# Patient Record
Sex: MALE | Race: ASIAN | NOT HISPANIC OR LATINO | URBAN - METROPOLITAN AREA
[De-identification: names, ages, dates, MRNs, and addresses within clinical notes are randomized per-mention and may not be internally consistent; named-entity substitution may affect disease eponyms.]

---

## 2018-02-09 ENCOUNTER — INPATIENT (INPATIENT)
Facility: HOSPITAL | Age: 73
LOS: 8 days | Discharge: HOME | End: 2018-02-18
Attending: INTERNAL MEDICINE | Admitting: INTERNAL MEDICINE
Payer: MEDICARE

## 2018-02-09 VITALS
TEMPERATURE: 98 F | SYSTOLIC BLOOD PRESSURE: 145 MMHG | DIASTOLIC BLOOD PRESSURE: 72 MMHG | OXYGEN SATURATION: 99 % | HEART RATE: 88 BPM | RESPIRATION RATE: 18 BRPM

## 2018-02-09 LAB
ALBUMIN SERPL ELPH-MCNC: 3 G/DL — LOW (ref 3.3–5)
ALP SERPL-CCNC: 56 U/L — SIGNIFICANT CHANGE UP (ref 40–120)
ALT FLD-CCNC: 9 U/L — SIGNIFICANT CHANGE UP (ref 4–41)
APTT BLD: 60.9 SEC — HIGH (ref 27.5–37.4)
AST SERPL-CCNC: 26 U/L — SIGNIFICANT CHANGE UP (ref 4–40)
BASE EXCESS BLDV CALC-SCNC: 6.1 MMOL/L — SIGNIFICANT CHANGE UP
BASOPHILS # BLD AUTO: 0.01 K/UL — SIGNIFICANT CHANGE UP (ref 0–0.2)
BASOPHILS NFR BLD AUTO: 0.1 % — SIGNIFICANT CHANGE UP (ref 0–2)
BILIRUB SERPL-MCNC: 0.9 MG/DL — SIGNIFICANT CHANGE UP (ref 0.2–1.2)
BLOOD GAS VENOUS - CREATININE: 5.22 MG/DL — HIGH (ref 0.5–1.3)
BUN SERPL-MCNC: 22 MG/DL — SIGNIFICANT CHANGE UP (ref 7–23)
CALCIUM SERPL-MCNC: 8.2 MG/DL — LOW (ref 8.4–10.5)
CHLORIDE BLDV-SCNC: 100 MMOL/L — SIGNIFICANT CHANGE UP (ref 96–108)
CHLORIDE SERPL-SCNC: 96 MMOL/L — LOW (ref 98–107)
CO2 SERPL-SCNC: 24 MMOL/L — SIGNIFICANT CHANGE UP (ref 22–31)
CREAT SERPL-MCNC: 5.01 MG/DL — HIGH (ref 0.5–1.3)
EOSINOPHIL # BLD AUTO: 0.18 K/UL — SIGNIFICANT CHANGE UP (ref 0–0.5)
EOSINOPHIL NFR BLD AUTO: 2.6 % — SIGNIFICANT CHANGE UP (ref 0–6)
GAS PNL BLDV: 136 MMOL/L — SIGNIFICANT CHANGE UP (ref 136–146)
GLUCOSE BLDV-MCNC: 116 — HIGH (ref 70–99)
GLUCOSE SERPL-MCNC: 109 MG/DL — HIGH (ref 70–99)
HCO3 BLDV-SCNC: 30 MMOL/L — HIGH (ref 20–27)
HCT VFR BLD CALC: 22.7 % — LOW (ref 39–50)
HCT VFR BLDV CALC: 22.5 % — LOW (ref 39–51)
HGB BLD-MCNC: 7.2 G/DL — LOW (ref 13–17)
HGB BLDV-MCNC: 7.2 G/DL — LOW (ref 13–17)
IMM GRANULOCYTES # BLD AUTO: 0.03 # — SIGNIFICANT CHANGE UP
IMM GRANULOCYTES NFR BLD AUTO: 0.4 % — SIGNIFICANT CHANGE UP (ref 0–1.5)
INR BLD: 6.6 — CRITICAL HIGH (ref 0.88–1.17)
LACTATE BLDV-MCNC: 1.4 MMOL/L — SIGNIFICANT CHANGE UP (ref 0.5–2)
LYMPHOCYTES # BLD AUTO: 0.43 K/UL — LOW (ref 1–3.3)
LYMPHOCYTES # BLD AUTO: 6.2 % — LOW (ref 13–44)
MCHC RBC-ENTMCNC: 30.5 PG — SIGNIFICANT CHANGE UP (ref 27–34)
MCHC RBC-ENTMCNC: 31.7 % — LOW (ref 32–36)
MCV RBC AUTO: 96.2 FL — SIGNIFICANT CHANGE UP (ref 80–100)
MONOCYTES # BLD AUTO: 0.45 K/UL — SIGNIFICANT CHANGE UP (ref 0–0.9)
MONOCYTES NFR BLD AUTO: 6.5 % — SIGNIFICANT CHANGE UP (ref 2–14)
NEUTROPHILS # BLD AUTO: 5.79 K/UL — SIGNIFICANT CHANGE UP (ref 1.8–7.4)
NEUTROPHILS NFR BLD AUTO: 84.2 % — HIGH (ref 43–77)
NRBC # FLD: 0 — SIGNIFICANT CHANGE UP
PCO2 BLDV: 43 MMHG — SIGNIFICANT CHANGE UP (ref 41–51)
PH BLDV: 7.46 PH — HIGH (ref 7.32–7.43)
PLATELET # BLD AUTO: 185 K/UL — SIGNIFICANT CHANGE UP (ref 150–400)
PMV BLD: 10 FL — SIGNIFICANT CHANGE UP (ref 7–13)
PO2 BLDV: 45 MMHG — HIGH (ref 35–40)
POTASSIUM BLDV-SCNC: 3.8 MMOL/L — SIGNIFICANT CHANGE UP (ref 3.4–4.5)
POTASSIUM SERPL-MCNC: 4.1 MMOL/L — SIGNIFICANT CHANGE UP (ref 3.5–5.3)
POTASSIUM SERPL-SCNC: 4.1 MMOL/L — SIGNIFICANT CHANGE UP (ref 3.5–5.3)
PROT SERPL-MCNC: 7.3 G/DL — SIGNIFICANT CHANGE UP (ref 6–8.3)
PROTHROM AB SERPL-ACNC: 76.1 SEC — HIGH (ref 9.8–13.1)
RBC # BLD: 2.36 M/UL — LOW (ref 4.2–5.8)
RBC # FLD: 14.4 % — SIGNIFICANT CHANGE UP (ref 10.3–14.5)
SAO2 % BLDV: 78 % — SIGNIFICANT CHANGE UP (ref 60–85)
SODIUM SERPL-SCNC: 137 MMOL/L — SIGNIFICANT CHANGE UP (ref 135–145)
WBC # BLD: 6.89 K/UL — SIGNIFICANT CHANGE UP (ref 3.8–10.5)
WBC # FLD AUTO: 6.89 K/UL — SIGNIFICANT CHANGE UP (ref 3.8–10.5)

## 2018-02-09 RX ORDER — ACETAMINOPHEN 500 MG
975 TABLET ORAL ONCE
Qty: 0 | Refills: 0 | Status: COMPLETED | OUTPATIENT
Start: 2018-02-09 | End: 2018-02-09

## 2018-02-09 RX ORDER — DIPHENHYDRAMINE HCL 50 MG
25 CAPSULE ORAL ONCE
Qty: 0 | Refills: 0 | Status: COMPLETED | OUTPATIENT
Start: 2018-02-09 | End: 2018-02-09

## 2018-02-09 RX ORDER — ALBUTEROL 90 UG/1
2.5 AEROSOL, METERED ORAL ONCE
Qty: 0 | Refills: 0 | Status: DISCONTINUED | OUTPATIENT
Start: 2018-02-09 | End: 2018-02-09

## 2018-02-09 RX ORDER — PIPERACILLIN AND TAZOBACTAM 4; .5 G/20ML; G/20ML
3.38 INJECTION, POWDER, LYOPHILIZED, FOR SOLUTION INTRAVENOUS ONCE
Qty: 0 | Refills: 0 | Status: COMPLETED | OUTPATIENT
Start: 2018-02-09 | End: 2018-02-09

## 2018-02-09 RX ORDER — WARFARIN SODIUM 2.5 MG/1
1 TABLET ORAL ONCE
Qty: 0 | Refills: 0 | Status: COMPLETED | OUTPATIENT
Start: 2018-02-09 | End: 2018-02-09

## 2018-02-09 RX ORDER — VANCOMYCIN HCL 1 G
1000 VIAL (EA) INTRAVENOUS ONCE
Qty: 0 | Refills: 0 | Status: COMPLETED | OUTPATIENT
Start: 2018-02-09 | End: 2018-02-09

## 2018-02-09 RX ADMIN — Medication 100 MILLIGRAM(S): at 21:01

## 2018-02-09 RX ADMIN — Medication 975 MILLIGRAM(S): at 23:05

## 2018-02-09 RX ADMIN — PIPERACILLIN AND TAZOBACTAM 200 GRAM(S): 4; .5 INJECTION, POWDER, LYOPHILIZED, FOR SOLUTION INTRAVENOUS at 21:01

## 2018-02-09 RX ADMIN — Medication 25 MILLIGRAM(S): at 22:25

## 2018-02-09 RX ADMIN — Medication 250 MILLIGRAM(S): at 21:37

## 2018-02-09 RX ADMIN — WARFARIN SODIUM 1 MILLIGRAM(S): 2.5 TABLET ORAL at 23:05

## 2018-02-09 NOTE — ED ADULT TRIAGE NOTE - CHIEF COMPLAINT QUOTE
Pt bibems from home, hx of esrd bilateral av fistulas, last hd this amt, htn, and afib on coumadin, pt here for evaluation of shortness of breath and cough, xray of chest shows pt has pna. no fevers at home

## 2018-02-09 NOTE — ED PROVIDER NOTE - OBJECTIVE STATEMENT
72M with PMH of ESRD on HD (MWF- full session) via LUE AVF, afib, mechanical mitral valve repair (on warfarin) p/w cough x 3 weeks since was hospitalized in jan has been coughing since  at OSH at bleeding in fistula;. no cp. + productive- unsure what color; + decreased po x several days; no diarrhea/constipation/dysuria; no hemoptysis; had flu shot this year;  x-ray this am shows pna at [facility] radiology;      kidney doctor: fresh feliz;     mayo ting- flushing;

## 2018-02-09 NOTE — ED PROVIDER NOTE - CARE PLAN
Principal Discharge DX:	PNA (pneumonia)  Secondary Diagnosis:	Atrial fibrillation with rapid ventricular response

## 2018-02-09 NOTE — ED PROVIDER NOTE - ATTENDING CONTRIBUTION TO CARE
EZEKIEL Attending Note - Dr. Chavez  72M with PMH of ESRD on HD (MWF- full session) via LUE AVF, afib, mechanical mitral valve repair (on warfarin) p/w cough x 3 weeks with exertional duspnea.  PE: pt is alert and oriented but appears weak and fatigued, perrl, ent normal, membranes are dry, neck supple. no lymphadenopathy or thyroid enlargement, No JVD.  Chest clear to P&A, Heart- Irreg rhythm without murmur, rubs or gallops but metallic click., radial pulses equal bilaterally.  Abd is soft, non-tender, Bowel sounds are active. no mass or organomegaly. : No CVA tenderness. Neuro:  Pt alert and oriented x 3. Perrl    Distal neurosensory is intact. Motor function is 5/5 strength bilaterally.  No focal deficits. Extremities:  No edema.  Skin: warm and dry.  Plan: labs with CBC, CMP, EKG to R/O cardiac etiology, CXR to R/O pneumoniae.   Impression:   Pneumonia

## 2018-02-09 NOTE — ED ADULT NURSE NOTE - OBJECTIVE STATEMENT
Pt received in spot 7. Alert and oriented x3. Primarily Cantonese speaking, wife at bedside to translate. Co sob, cough x 2 weeks. Sent from pcp for +pneumonia in chest x-ray. Afebrile at current time. Pt has bilateral arm fistulas. +bruit and thrill. Wife states patient normally requires an IV in his foot. MD made aware. Placed on cm. VS as stated. Awaiting MD ellis. Comfort measures provided. Will monitor.

## 2018-02-10 DIAGNOSIS — Z29.9 ENCOUNTER FOR PROPHYLACTIC MEASURES, UNSPECIFIED: ICD-10-CM

## 2018-02-10 DIAGNOSIS — R63.8 OTHER SYMPTOMS AND SIGNS CONCERNING FOOD AND FLUID INTAKE: ICD-10-CM

## 2018-02-10 DIAGNOSIS — J18.9 PNEUMONIA, UNSPECIFIED ORGANISM: ICD-10-CM

## 2018-02-10 DIAGNOSIS — N18.6 END STAGE RENAL DISEASE: ICD-10-CM

## 2018-02-10 DIAGNOSIS — Z95.2 PRESENCE OF PROSTHETIC HEART VALVE: ICD-10-CM

## 2018-02-10 DIAGNOSIS — I10 ESSENTIAL (PRIMARY) HYPERTENSION: ICD-10-CM

## 2018-02-10 DIAGNOSIS — I48.91 UNSPECIFIED ATRIAL FIBRILLATION: ICD-10-CM

## 2018-02-10 LAB
B PERT DNA SPEC QL NAA+PROBE: SIGNIFICANT CHANGE UP
BLD GP AB SCN SERPL QL: NEGATIVE — SIGNIFICANT CHANGE UP
BUN SERPL-MCNC: 34 MG/DL — HIGH (ref 7–23)
C PNEUM DNA SPEC QL NAA+PROBE: NOT DETECTED — SIGNIFICANT CHANGE UP
CALCIUM SERPL-MCNC: 8.5 MG/DL — SIGNIFICANT CHANGE UP (ref 8.4–10.5)
CHLORIDE SERPL-SCNC: 94 MMOL/L — LOW (ref 98–107)
CO2 SERPL-SCNC: 26 MMOL/L — SIGNIFICANT CHANGE UP (ref 22–31)
CREAT SERPL-MCNC: 6.71 MG/DL — HIGH (ref 0.5–1.3)
FLUAV H1 2009 PAND RNA SPEC QL NAA+PROBE: POSITIVE — HIGH
FLUAV H1 RNA SPEC QL NAA+PROBE: NOT DETECTED — SIGNIFICANT CHANGE UP
FLUAV H3 RNA SPEC QL NAA+PROBE: NOT DETECTED — SIGNIFICANT CHANGE UP
FLUBV RNA SPEC QL NAA+PROBE: NOT DETECTED — SIGNIFICANT CHANGE UP
GLUCOSE SERPL-MCNC: 106 MG/DL — HIGH (ref 70–99)
HADV DNA SPEC QL NAA+PROBE: NOT DETECTED — SIGNIFICANT CHANGE UP
HCOV 229E RNA SPEC QL NAA+PROBE: NOT DETECTED — SIGNIFICANT CHANGE UP
HCOV HKU1 RNA SPEC QL NAA+PROBE: NOT DETECTED — SIGNIFICANT CHANGE UP
HCOV NL63 RNA SPEC QL NAA+PROBE: NOT DETECTED — SIGNIFICANT CHANGE UP
HCOV OC43 RNA SPEC QL NAA+PROBE: NOT DETECTED — SIGNIFICANT CHANGE UP
HCT VFR BLD CALC: 22.3 % — LOW (ref 39–50)
HCT VFR BLD CALC: 22.8 % — LOW (ref 39–50)
HGB BLD-MCNC: 7 G/DL — CRITICAL LOW (ref 13–17)
HGB BLD-MCNC: 7.5 G/DL — LOW (ref 13–17)
HMPV RNA SPEC QL NAA+PROBE: NOT DETECTED — SIGNIFICANT CHANGE UP
HPIV1 RNA SPEC QL NAA+PROBE: NOT DETECTED — SIGNIFICANT CHANGE UP
HPIV2 RNA SPEC QL NAA+PROBE: NOT DETECTED — SIGNIFICANT CHANGE UP
HPIV3 RNA SPEC QL NAA+PROBE: NOT DETECTED — SIGNIFICANT CHANGE UP
HPIV4 RNA SPEC QL NAA+PROBE: NOT DETECTED — SIGNIFICANT CHANGE UP
INR BLD: 7.53 — CRITICAL HIGH (ref 0.88–1.17)
M PNEUMO DNA SPEC QL NAA+PROBE: NOT DETECTED — SIGNIFICANT CHANGE UP
MAGNESIUM SERPL-MCNC: 2.2 MG/DL — SIGNIFICANT CHANGE UP (ref 1.6–2.6)
MCHC RBC-ENTMCNC: 30.4 PG — SIGNIFICANT CHANGE UP (ref 27–34)
MCHC RBC-ENTMCNC: 31.4 % — LOW (ref 32–36)
MCHC RBC-ENTMCNC: 31.9 PG — SIGNIFICANT CHANGE UP (ref 27–34)
MCHC RBC-ENTMCNC: 32.9 % — SIGNIFICANT CHANGE UP (ref 32–36)
MCV RBC AUTO: 97 FL — SIGNIFICANT CHANGE UP (ref 80–100)
MCV RBC AUTO: 97 FL — SIGNIFICANT CHANGE UP (ref 80–100)
NRBC # FLD: 0 — SIGNIFICANT CHANGE UP
NRBC # FLD: 0 — SIGNIFICANT CHANGE UP
PHOSPHATE SERPL-MCNC: 3.8 MG/DL — SIGNIFICANT CHANGE UP (ref 2.5–4.5)
PLATELET # BLD AUTO: 166 K/UL — SIGNIFICANT CHANGE UP (ref 150–400)
PLATELET # BLD AUTO: 174 K/UL — SIGNIFICANT CHANGE UP (ref 150–400)
PMV BLD: 10.1 FL — SIGNIFICANT CHANGE UP (ref 7–13)
PMV BLD: 10.3 FL — SIGNIFICANT CHANGE UP (ref 7–13)
POTASSIUM SERPL-MCNC: 4.1 MMOL/L — SIGNIFICANT CHANGE UP (ref 3.5–5.3)
POTASSIUM SERPL-SCNC: 4.1 MMOL/L — SIGNIFICANT CHANGE UP (ref 3.5–5.3)
PROTHROM AB SERPL-ACNC: 87.1 SEC — HIGH (ref 9.8–13.1)
RBC # BLD: 2.3 M/UL — LOW (ref 4.2–5.8)
RBC # BLD: 2.35 M/UL — LOW (ref 4.2–5.8)
RBC # FLD: 14.5 % — SIGNIFICANT CHANGE UP (ref 10.3–14.5)
RBC # FLD: 14.7 % — HIGH (ref 10.3–14.5)
RH IG SCN BLD-IMP: POSITIVE — SIGNIFICANT CHANGE UP
RSV RNA SPEC QL NAA+PROBE: NOT DETECTED — SIGNIFICANT CHANGE UP
RV+EV RNA SPEC QL NAA+PROBE: NOT DETECTED — SIGNIFICANT CHANGE UP
SODIUM SERPL-SCNC: 134 MMOL/L — LOW (ref 135–145)
SPECIMEN SOURCE: SIGNIFICANT CHANGE UP
SPECIMEN SOURCE: SIGNIFICANT CHANGE UP
TSH SERPL-MCNC: 4.49 UIU/ML — HIGH (ref 0.27–4.2)
VANCOMYCIN FLD-MCNC: 3.1 UG/ML — SIGNIFICANT CHANGE UP
WBC # BLD: 5.86 K/UL — SIGNIFICANT CHANGE UP (ref 3.8–10.5)
WBC # BLD: 6.98 K/UL — SIGNIFICANT CHANGE UP (ref 3.8–10.5)
WBC # FLD AUTO: 5.86 K/UL — SIGNIFICANT CHANGE UP (ref 3.8–10.5)
WBC # FLD AUTO: 6.98 K/UL — SIGNIFICANT CHANGE UP (ref 3.8–10.5)

## 2018-02-10 PROCEDURE — 71046 X-RAY EXAM CHEST 2 VIEWS: CPT | Mod: 26

## 2018-02-10 PROCEDURE — 71250 CT THORAX DX C-: CPT | Mod: 26

## 2018-02-10 PROCEDURE — 12345: CPT | Mod: NC

## 2018-02-10 PROCEDURE — 93010 ELECTROCARDIOGRAM REPORT: CPT

## 2018-02-10 PROCEDURE — 99223 1ST HOSP IP/OBS HIGH 75: CPT | Mod: GC

## 2018-02-10 RX ORDER — CEFEPIME 1 G/1
1000 INJECTION, POWDER, FOR SOLUTION INTRAMUSCULAR; INTRAVENOUS EVERY 24 HOURS
Qty: 0 | Refills: 0 | Status: DISCONTINUED | OUTPATIENT
Start: 2018-02-10 | End: 2018-02-11

## 2018-02-10 RX ORDER — DONEPEZIL HYDROCHLORIDE 10 MG/1
5 TABLET, FILM COATED ORAL AT BEDTIME
Qty: 0 | Refills: 0 | Status: DISCONTINUED | OUTPATIENT
Start: 2018-02-10 | End: 2018-02-18

## 2018-02-10 RX ORDER — ATORVASTATIN CALCIUM 80 MG/1
40 TABLET, FILM COATED ORAL AT BEDTIME
Qty: 0 | Refills: 0 | Status: DISCONTINUED | OUTPATIENT
Start: 2018-02-10 | End: 2018-02-18

## 2018-02-10 RX ORDER — LACTOBACILLUS ACIDOPHILUS 100MM CELL
1 CAPSULE ORAL EVERY 12 HOURS
Qty: 0 | Refills: 0 | Status: DISCONTINUED | OUTPATIENT
Start: 2018-02-10 | End: 2018-02-18

## 2018-02-10 RX ORDER — LINEZOLID 600 MG/300ML
600 INJECTION, SOLUTION INTRAVENOUS ONCE
Qty: 0 | Refills: 0 | Status: COMPLETED | OUTPATIENT
Start: 2018-02-10 | End: 2018-02-10

## 2018-02-10 RX ORDER — ATORVASTATIN CALCIUM 80 MG/1
1 TABLET, FILM COATED ORAL
Qty: 0 | Refills: 0 | COMMUNITY

## 2018-02-10 RX ORDER — FOLIC ACID 0.8 MG
1 TABLET ORAL DAILY
Qty: 0 | Refills: 0 | Status: DISCONTINUED | OUTPATIENT
Start: 2018-02-10 | End: 2018-02-18

## 2018-02-10 RX ORDER — METOPROLOL TARTRATE 50 MG
25 TABLET ORAL DAILY
Qty: 0 | Refills: 0 | Status: DISCONTINUED | OUTPATIENT
Start: 2018-02-10 | End: 2018-02-18

## 2018-02-10 RX ORDER — SEVELAMER CARBONATE 2400 MG/1
2400 POWDER, FOR SUSPENSION ORAL
Qty: 0 | Refills: 0 | Status: DISCONTINUED | OUTPATIENT
Start: 2018-02-10 | End: 2018-02-18

## 2018-02-10 RX ORDER — PIPERACILLIN AND TAZOBACTAM 4; .5 G/20ML; G/20ML
3.38 INJECTION, POWDER, LYOPHILIZED, FOR SOLUTION INTRAVENOUS EVERY 12 HOURS
Qty: 0 | Refills: 0 | Status: DISCONTINUED | OUTPATIENT
Start: 2018-02-10 | End: 2018-02-10

## 2018-02-10 RX ADMIN — SEVELAMER CARBONATE 2400 MILLIGRAM(S): 2400 POWDER, FOR SUSPENSION ORAL at 18:43

## 2018-02-10 RX ADMIN — Medication 25 MILLIGRAM(S): at 06:27

## 2018-02-10 RX ADMIN — ATORVASTATIN CALCIUM 40 MILLIGRAM(S): 80 TABLET, FILM COATED ORAL at 21:15

## 2018-02-10 RX ADMIN — SEVELAMER CARBONATE 2400 MILLIGRAM(S): 2400 POWDER, FOR SUSPENSION ORAL at 08:57

## 2018-02-10 RX ADMIN — Medication 110 MILLIGRAM(S): at 18:43

## 2018-02-10 RX ADMIN — Medication 30 MILLIGRAM(S): at 06:49

## 2018-02-10 RX ADMIN — Medication 1 MILLIGRAM(S): at 12:13

## 2018-02-10 RX ADMIN — DONEPEZIL HYDROCHLORIDE 5 MILLIGRAM(S): 10 TABLET, FILM COATED ORAL at 21:15

## 2018-02-10 RX ADMIN — Medication 1 TABLET(S): at 06:27

## 2018-02-10 RX ADMIN — LINEZOLID 300 MILLIGRAM(S): 600 INJECTION, SOLUTION INTRAVENOUS at 01:32

## 2018-02-10 RX ADMIN — CEFEPIME 100 MILLIGRAM(S): 1 INJECTION, POWDER, FOR SOLUTION INTRAMUSCULAR; INTRAVENOUS at 21:15

## 2018-02-10 RX ADMIN — Medication 975 MILLIGRAM(S): at 01:18

## 2018-02-10 RX ADMIN — Medication 1 TABLET(S): at 18:42

## 2018-02-10 NOTE — H&P ADULT - NEGATIVE GASTROINTESTINAL SYMPTOMS
no diarrhea/no nausea/no vomiting/no constipation/no change in bowel habits no nausea/no diarrhea/no change in bowel habits/no vomiting/no constipation/no abdominal pain

## 2018-02-10 NOTE — H&P ADULT - PROBLEM SELECTOR PLAN 3
- s/p mechanical MVR  - INR subtherapeutic, given 1mg coumadin  - daily INRs - s/p mechanical MVR  - INR supra therapeutic, given 1mg coumadin in ED   - daily INRs

## 2018-02-10 NOTE — PROGRESS NOTE ADULT - SUBJECTIVE AND OBJECTIVE BOX
CHIEF COMPLAINT: Patient is a 72y old  male who presents with a chief complaint of Cough (10 Feb 2018 03:15)      SUBJECTIVE / OVERNIGHT EVENTS:    Patient seen with wife, son at the bedside. Hard of hearing. No complaints. Coughing occasionally. Family reports poor appetite.    Per family, patient has two AV fistulas. However, does not use the LUE one anymore, because "the skin was getting too thin." Has had blood draws through the L hand in the past.    Discussed we are treating pneumonia and influenza.    MEDICATIONS  (STANDING):  atorvastatin 40 milliGRAM(s) Oral at bedtime  donepezil 5 milliGRAM(s) Oral at bedtime  folic acid 1 milliGRAM(s) Oral daily  lactobacillus acidophilus 1 Tablet(s) Oral every 12 hours  metoprolol succinate ER 25 milliGRAM(s) Oral daily  oseltamivir 30 milliGRAM(s) Oral <User Schedule>  sevelamer hydrochloride 2400 milliGRAM(s) Oral two times a day with meals    MEDICATIONS  (PRN):      VITALS:  T(F): 99 (02-10-18 @ 12:10), Max: 101 (18 @ 23:10)  HR: 88 (02-10-18 @ 12:10) (85 - 168)  BP: 165/95 (02-10-18 @ 12:10) (117/54 - 165/95)  RR: 18 (02-10-18 @ 12:10) (18 - 23)  SpO2: 100% (02-10-18 @ 12:10)  Height (cm): 165.1 (06:54)  Weight (kg): 63.9 (06:54)  BMI (kg/m2): 23.4 (06:54)    CAPILLARY BLOOD GLUCOSE    Output   Height (cm): 165.1 (06:54)  Weight (kg): 63.9 (06:54)  BMI (kg/m2): 23.4 (06:54)  I&O's Summary  T(F): 99 (02-10-18 @ 12:10), Max: 101 (18 @ 23:10)  HR: 88 (02-10-18 @ 12:10) (85 - 168)  BP: 165/95 (02-10-18 @ 12:10) (117/54 - 165/95)  RR: 18 (02-10-18 @ 12:10) (18 - 23)  SpO2: 100% (02-10-18 @ 12:10)    PHYSICAL EXAM:  GENERAL: NAD, well-developed  HEAD:  Atraumatic, Normocephalic  EYES: EOMI  NECK: Supple, No JVD  CHEST/LUNG: diffuse crackles bilaterallyl  HEART: nl S1/S2  ABDOMEN: nondistended, soft  EXTREMITIES:  no LE edema  PSYCH: A&Ox3  NEUROLOGY: hard of hearing  SKIN: No rashes noted    LABS:              7.2                  137  | 24   | 22           6.89  >-----------< 185     ------------------------< 109                   22.7                 4.1  | 96   | 5.01                                         Ca 8.2   Mg x     Ph x           TPro  7.3  /  Alb  3.0      TBili  0.9  /  DBili  x         AST  26  /  ALT  9             AlkPhos  56      INR: 6.60<HH>;    PT: 76.1 SEC<H>;    PTT: 60.9 SEC<H>            VB-09 @ 21:00  7.46/43/45/30/78.0/6.1        MICROBIOLOGY:< from: CT Chest No Cont (02.10.18 @ 11:09) >  IMPRESSION:     Multifocal pneumonia.    Bilateral pleural effusions.    Cirrhosis.    < end of copied text >          RADIOLOGY & ADDITIONAL TESTS:          Imaging Personally Reviewed:    [ ] Consultant(s) Notes Reviewed:  [ ] Care Discussed with Consultants/Other Providers:

## 2018-02-10 NOTE — H&P ADULT - NSHPLABSRESULTS_GEN_ALL_CORE
7.2    6.89  )-----------( 185      ( 09 Feb 2018 21:22 )             22.7       02-09    137  |  96<L>  |  22  ----------------------------<  109<H>  4.1   |  24  |  5.01<H>    Ca    8.2<L>      09 Feb 2018 21:22    TPro  7.3  /  Alb  3.0<L>  /  TBili  0.9  /  DBili  x   /  AST  26  /  ALT  9   /  AlkPhos  56  02-09            PT/INR - ( 09 Feb 2018 21:47 )   PT: 76.1 SEC;   INR: 6.60          PTT - ( 09 Feb 2018 21:47 )  PTT:60.9 SEC    Lactate Trend  Blood Gas Venous - Lactate: 1.4: Please note updated reference range. mmol/L (02.09.18 @ 21:00)      < from: Xray Chest 2 Views PA/Lat (02.10.18 @ 00:49) >    ******PRELIMINARY REPORT******            INTERPRETATION:  Bilateral opacities, favor pulmonary edema.  Bilateral pleural effusions.    < end of copied text >

## 2018-02-10 NOTE — H&P ADULT - NSHPPHYSICALEXAM_GEN_ALL_CORE
Vital Signs Last 24 Hrs  T(C): 37.1 (10 Feb 2018 02:01), Max: 38.3 (09 Feb 2018 23:10)  T(F): 98.8 (10 Feb 2018 02:01), Max: 101 (09 Feb 2018 23:10)  HR: 94 (10 Feb 2018 02:01) (88 - 168)  BP: 123/57 (10 Feb 2018 02:01) (123/57 - 162/70)  BP(mean): --  RR: 23 (10 Feb 2018 02:01) (18 - 23)  SpO2: 100% (10 Feb 2018 02:01) (96% - 100%)    PHYSICAL EXAM:    GENERAL: Comfortable, no acute distress, thin and cachetic   HEAD:  Normocephalic, atraumatic  EYES: EOMI, PERRLA  HEENT: Moist mucous membranes  NECK: Supple, + JVD  NERVOUS SYSTEM:  Alert & Oriented X2, Motor Strength 5/5 B/L upper and lower extremities  CHEST/LUNG: Clear to auscultation bilaterally  HEART: Irregularly irregular, + systolic murmur w/ mechanical heart valve  ABDOMEN: Soft, Nontender, Nondistended, Bowel sounds present  EXTREMITIES:   No clubbing, cyanosis, or edema  MUSCULOSKELETAL: No muscle tenderness, no joint tenderness, + LUE fistula w/ palpable thrill   SKIN:  warm and dry, no rash

## 2018-02-10 NOTE — H&P ADULT - PROBLEM SELECTOR PLAN 1
- has new cough with congestion seen on CXR, pna vs pulm edema   - check CT chest   - c/w abx Vanc and Zosyn   - f/u blood cultures   - RVP results pending - has new cough with congestion seen on CXR, pna vs pulm edema   - check CT chest   - c/w abx Vanc and Zosyn   - f/u blood and sputum cultures   - RVP results pending  - fall and aspiration precautions - found to have influenza   - has new cough with congestion seen on CXR, pna vs pulm edema   - check CT chest   - hold abx, start tamiflu 30mg now then after HD for total of 5 days    - f/u blood and sputum cultures   - RVP+ for influenza   - fall and aspiration precautions  - isolation

## 2018-02-10 NOTE — H&P ADULT - ASSESSMENT
72M with PMH of ESRD on HD (MWF- full session) via LUE AVF, afib, mechanical mitral valve repair (on warfarin) p/w cough x 3 weeks admitted for possible pna c/b afib w/ RVR in ED.

## 2018-02-10 NOTE — H&P ADULT - NSHPSOCIALHISTORY_GEN_ALL_CORE
Social History:    Marital Status:  (  X )    (   ) Single    (   )    (  )   Lives with: (  ) alone  (  ) children   ( X ) spouse   (  ) parents  (  ) other    Substance Use (street drugs): ( X ) never used  (  ) other:  Tobacco Usage:  ( X  ) never smoked   (   ) former smoker   (   ) current smoker  (     ) pack year  (        ) last cigarette date  Alcohol Usage: Denies

## 2018-02-10 NOTE — PROGRESS NOTE ADULT - ASSESSMENT
72M with PMH of ESRD on HD (MWF- full session) via LUE AVF, afib, mechanical mitral valve repair (on warfarin) p/w cough x 3 weeks admitted for possible pna c/b afib w/ RVR in ED.     *Multilobar pneumonia: suspect gram negative pneumonia, also possible staph aureus pneumonia given recent influenza. RVP positive for Flu  - cefepime, vancomycin - renally dose for ESRD  - doxycycline for atypical coverage given prolong QTc  - CT chest with bilateral effusions  - pulmonary consult  - f/u cultures  - monitor closely for improvement  - leukocytosis - trend    *Anemia: possible acute blood anemia in setting of elevated INR  - baseline Hgb unknown - obtain outpatient labs  - repeat CBC, INR now  - trend CBC q12h  - active T&S  - FOBT  - if any signs of GI bleeding, consult GI urgently  - transfuse if hgb < 7    *Influenza: complete 5 day course of Tamiflu    *A fib with RVR: RVR likely 2/2 infection  - management of infection as above  - cont tele  - HR now controlled  - cont toprol XL 25 mg daily  - INR 6 - hold coumadin and monitor INR daily    *Mechanical MVR - also has A fib  - INR goal 2.5 - 3.5  - INR supratherapeutic on admission, likely 2/2 poor PO intake  - monitor INR daily  - resume coumadin once INR < 3.5    *ESRD on HD:  - renal consult for HD - next due on Monday    *HTN: continue home meds  - monitor BP closely    *FEN/GI: renal diet    *Ppx: supratherapeutic INR      *Dispo: pending medical stability  - PT consult  - OOBTC

## 2018-02-10 NOTE — H&P ADULT - HISTORY OF PRESENT ILLNESS
72M with PMH of ESRD on HD (MWF- full session) via LUE AVF, afib, mechanical mitral valve repair (on warfarin) p/w cough x 3 weeks   Reports having a recent hospitalization last month for a bleeding fistula.     In ED,   Tmax 101, HR , /72, RR 18, SpO2 99 on RA 72M with PMH of ESRD on HD (MWF- full session) via LUE AVF, afib, mechanical mitral valve repair (on warfarin) p/w cough x 3 weeks   Reports having a recent hospitalization last month for a bleeding fistula.     In ED,   Tmax 101, HR , /72, RR 18, SpO2 99 on RA   Gave 1mg coumadin, Linezolid, Vanc and Zosyn, tylenol, 15mg IVP diltiazem, 25mg benadryl   Had episode of afib w/ RVR in ED , improved s/p diltiazem 72M with PMHx of ESRD on HD (MWF- full session) via LUE AVF, afib, mechanical mitral valve repair (on warfarin) p/w cough x 3 weeks.   Reports having a recent hospitalization last month for a bleeding fistula.   Patient has had a productive cough x3 weeks since last hospitalization. He became SOB today, so his family called EMS to bring to the ED per triage note. Patient wasn't sure why his family sent him to the hospital.   Denies any F/C, N/V, CP, headache, LE edema, or abdominal pain.   He had his regular dialysis this morning.     In ED,   Tmax 101, HR , /72, RR 18, SpO2 99 on RA   Gave 1mg coumadin, Linezolid, Vanc and Zosyn, tylenol, 15mg IVP diltiazem, 25mg benadryl   Had episode of afib w/ RVR in ED , improved s/p diltiazem 72M with PMHx of ESRD on HD (MWF- full session) via LUE AVF, afib, mechanical mitral valve repair (on warfarin) p/w cough x 3 weeks.   Reports having a recent hospitalization last month for a bleeding fistula at OSH.   Patient has had a productive cough x3 weeks since last hospitalization. He became SOB today, so his family called EMS to bring to the ED per triage note. Patient wasn't sure why his family sent him to the hospital.   Denies any F/C, N/V, CP, headache, LE edema, or abdominal pain.   He had his regular dialysis this morning.     In ED,   Tmax 101, HR , /72, RR 18, SpO2 99 on RA   Gave 1mg coumadin, Linezolid, Vanc and Zosyn, tylenol, 15mg IVP diltiazem, 25mg benadryl   Had episode of afib w/ RVR in ED , improved s/p diltiazem

## 2018-02-10 NOTE — H&P ADULT - NEGATIVE GENERAL SYMPTOMS
no fever/no chills/no sweating/no polydipsia/no fatigue/no malaise no polydipsia/no sweating/no chills/no fatigue/no malaise

## 2018-02-10 NOTE — H&P ADULT - PROBLEM SELECTOR PLAN 2
- s/p diltiazem IV push in ED, now in afib  - likely occurred 2/2 active infection   - c/w Toprol XL 25mg daily, may need to up titrate   - monitor on tele  - c/w a/c on coumadin - s/p diltiazem IV push in ED, now in afib  - likely occurred 2/2 active infection   - c/w Toprol XL 25mg daily, may need to up titrate   - monitor on tele  - c/w a/c on coumadin  - check TSH  - cards consult in AM  - check TTE

## 2018-02-10 NOTE — H&P ADULT - PROBLEM SELECTOR PLAN 5
- BP stable   - appeared to have been on Norvasc 5mg, but stopped last year  - need to f/u w/ family for med rec   - c/w toprol XL

## 2018-02-11 LAB
BASOPHILS # BLD AUTO: 0.01 K/UL — SIGNIFICANT CHANGE UP (ref 0–0.2)
BASOPHILS NFR BLD AUTO: 0.2 % — SIGNIFICANT CHANGE UP (ref 0–2)
BLD GP AB SCN SERPL QL: NEGATIVE — SIGNIFICANT CHANGE UP
BUN SERPL-MCNC: 43 MG/DL — HIGH (ref 7–23)
CALCIUM SERPL-MCNC: 8.5 MG/DL — SIGNIFICANT CHANGE UP (ref 8.4–10.5)
CHLORIDE SERPL-SCNC: 93 MMOL/L — LOW (ref 98–107)
CO2 SERPL-SCNC: 24 MMOL/L — SIGNIFICANT CHANGE UP (ref 22–31)
CREAT SERPL-MCNC: 8 MG/DL — HIGH (ref 0.5–1.3)
EOSINOPHIL # BLD AUTO: 0.48 K/UL — SIGNIFICANT CHANGE UP (ref 0–0.5)
EOSINOPHIL NFR BLD AUTO: 7.5 % — HIGH (ref 0–6)
FERRITIN SERPL-MCNC: 1670 NG/ML — HIGH (ref 30–400)
GLUCOSE SERPL-MCNC: 90 MG/DL — SIGNIFICANT CHANGE UP (ref 70–99)
HCT VFR BLD CALC: 23.2 % — LOW (ref 39–50)
HGB BLD-MCNC: 7.5 G/DL — LOW (ref 13–17)
IMM GRANULOCYTES # BLD AUTO: 0.04 # — SIGNIFICANT CHANGE UP
IMM GRANULOCYTES NFR BLD AUTO: 0.6 % — SIGNIFICANT CHANGE UP (ref 0–1.5)
INR BLD: 7.38 — CRITICAL HIGH (ref 0.88–1.17)
LYMPHOCYTES # BLD AUTO: 0.34 K/UL — LOW (ref 1–3.3)
LYMPHOCYTES # BLD AUTO: 5.3 % — LOW (ref 13–44)
MAGNESIUM SERPL-MCNC: 2.4 MG/DL — SIGNIFICANT CHANGE UP (ref 1.6–2.6)
MCHC RBC-ENTMCNC: 31.6 PG — SIGNIFICANT CHANGE UP (ref 27–34)
MCHC RBC-ENTMCNC: 32.3 % — SIGNIFICANT CHANGE UP (ref 32–36)
MCV RBC AUTO: 97.9 FL — SIGNIFICANT CHANGE UP (ref 80–100)
MONOCYTES # BLD AUTO: 0.49 K/UL — SIGNIFICANT CHANGE UP (ref 0–0.9)
MONOCYTES NFR BLD AUTO: 7.7 % — SIGNIFICANT CHANGE UP (ref 2–14)
NEUTROPHILS # BLD AUTO: 5.01 K/UL — SIGNIFICANT CHANGE UP (ref 1.8–7.4)
NEUTROPHILS NFR BLD AUTO: 78.7 % — HIGH (ref 43–77)
NRBC # FLD: 0 — SIGNIFICANT CHANGE UP
PLATELET # BLD AUTO: 169 K/UL — SIGNIFICANT CHANGE UP (ref 150–400)
PMV BLD: 10.4 FL — SIGNIFICANT CHANGE UP (ref 7–13)
POTASSIUM SERPL-MCNC: 4.2 MMOL/L — SIGNIFICANT CHANGE UP (ref 3.5–5.3)
POTASSIUM SERPL-SCNC: 4.2 MMOL/L — SIGNIFICANT CHANGE UP (ref 3.5–5.3)
PROTHROM AB SERPL-ACNC: 85.3 SEC — HIGH (ref 9.8–13.1)
RBC # BLD: 2.37 M/UL — LOW (ref 4.2–5.8)
RBC # FLD: 14.7 % — HIGH (ref 10.3–14.5)
RH IG SCN BLD-IMP: POSITIVE — SIGNIFICANT CHANGE UP
SODIUM SERPL-SCNC: 133 MMOL/L — LOW (ref 135–145)
WBC # BLD: 6.37 K/UL — SIGNIFICANT CHANGE UP (ref 3.8–10.5)
WBC # FLD AUTO: 6.37 K/UL — SIGNIFICANT CHANGE UP (ref 3.8–10.5)

## 2018-02-11 PROCEDURE — 99222 1ST HOSP IP/OBS MODERATE 55: CPT | Mod: GC

## 2018-02-11 PROCEDURE — 99233 SBSQ HOSP IP/OBS HIGH 50: CPT

## 2018-02-11 RX ORDER — VANCOMYCIN HCL 1 G
1000 VIAL (EA) INTRAVENOUS ONCE
Qty: 0 | Refills: 0 | Status: COMPLETED | OUTPATIENT
Start: 2018-02-11 | End: 2018-02-11

## 2018-02-11 RX ORDER — CEFEPIME 1 G/1
1000 INJECTION, POWDER, FOR SOLUTION INTRAMUSCULAR; INTRAVENOUS EVERY 24 HOURS
Qty: 0 | Refills: 0 | Status: DISCONTINUED | OUTPATIENT
Start: 2018-02-11 | End: 2018-02-12

## 2018-02-11 RX ADMIN — DONEPEZIL HYDROCHLORIDE 5 MILLIGRAM(S): 10 TABLET, FILM COATED ORAL at 21:41

## 2018-02-11 RX ADMIN — Medication 250 MILLIGRAM(S): at 00:39

## 2018-02-11 RX ADMIN — ATORVASTATIN CALCIUM 40 MILLIGRAM(S): 80 TABLET, FILM COATED ORAL at 21:41

## 2018-02-11 RX ADMIN — Medication 110 MILLIGRAM(S): at 06:34

## 2018-02-11 RX ADMIN — SEVELAMER CARBONATE 2400 MILLIGRAM(S): 2400 POWDER, FOR SUSPENSION ORAL at 08:55

## 2018-02-11 RX ADMIN — Medication 1 TABLET(S): at 05:32

## 2018-02-11 RX ADMIN — CEFEPIME 1000 MILLIGRAM(S): 1 INJECTION, POWDER, FOR SOLUTION INTRAMUSCULAR; INTRAVENOUS at 23:56

## 2018-02-11 RX ADMIN — Medication 100 MILLIGRAM(S): at 23:56

## 2018-02-11 RX ADMIN — Medication 1 TABLET(S): at 18:42

## 2018-02-11 RX ADMIN — Medication 25 MILLIGRAM(S): at 05:32

## 2018-02-11 RX ADMIN — Medication 1 MILLIGRAM(S): at 18:42

## 2018-02-11 RX ADMIN — SEVELAMER CARBONATE 2400 MILLIGRAM(S): 2400 POWDER, FOR SUSPENSION ORAL at 18:42

## 2018-02-11 NOTE — PROVIDER CONTACT NOTE (MEDICATION) - SITUATION
Notified Shoshana ARELLANO [id number] Pt still does  not have IV access.   Needs to receive Cefepime this PM.  Also Pt had 6 beats of V tach.

## 2018-02-11 NOTE — PROGRESS NOTE ADULT - ASSESSMENT
72M with PMH of ESRD on HD (MWF- full session) via LUE AVF, afib, mechanical mitral valve repair (on warfarin) p/w cough x 3 weeks admitted for possible pna c/b afib w/ RVR in ED.     *Multilobar pneumonia: suspect gram negative pneumonia, also possible staph aureus pneumonia given recent influenza. RVP positive for Flu  - cefepime, vancomycin - renally dose for ESRD  - doxycycline for atypical coverage given prolong QTc  - CT chest with bilateral effusions  - pulmonary consult  - f/u cultures  - monitor closely for improvement  - leukocytosis - trend    *Supratherapeutic INR: on coumadin for mechanical MVR with a fib  - hgb stable  - no evidence of bleeding  - INR 7.4 today  - monitor INR off coumadin for now    *Anemia: possible acute blood anemia in setting of elevated INR. Likely anemia 2/2 CKD.  - baseline Hgb unknown - obtain outpatient labs  - hgb stable today, continue to monitor closely  - iron panel, ferritin  - active T&S  - FOBT  - transfuse if hgb < 7    *Influenza: complete 5 day course of Tamiflu    *A fib with RVR: RVR likely 2/2 infection  - management of infection as above  - cont tele  - HR now controlled  - cont toprol XL 25 mg daily    *Mechanical MVR - also has A fib  - INR goal 2.5 - 3.5  - INR supratherapeutic on admission, likely 2/2 poor PO intake  - monitor INR daily  - resume coumadin once INR < 3.5    *ESRD on HD:  - renal consult for HD - next due on Monday    *Cirrhosis: seen incidentally on CT chest  - bilirubin normal, albumin slightly decreased in setting of infection  - will corroborate history with patient/family    *HTN: continue home meds  - monitor BP closely    *Hyponatremia: likely in setting of ESRD  - mild  - monitor  - for HD on Monday    *FEN/GI: renal diet    *Ppx: supratherapeutic INR      *Dispo: pending medical stability  - nutrition consult  - PT consult  - OOBTC

## 2018-02-11 NOTE — CONSULT NOTE ADULT - ASSESSMENT
71 yo M. PMH of ESRD on HD (MWF- full session) via LUE AVF, afib, mechanical mitral valve repair (on warfarin) presents with cough x 3 weeks found to be flu positive with multifocal pneumonia and b/l pleural effusions.     1) Pleural effusion      2) Pneumonia 71 yo M. PMH of ESRD on HD (MWF- full session) via LUE AVF, afib, mechanical mitral valve repair (on warfarin) presents with cough x 3 weeks found to be flu positive with multifocal pneumonia and b/l pleural effusions.     1) Pleural effusion  - POCUS showed small left and small to moderate pleural effusions and they were simple appearing  - effusion may be parapneumonic given the underlying pneumonia but the pt is non toxic appearing, afebrile and is clinically improving so it is doubtful that this is an empyema  - given the above a diagnostic tap is not urgent and with an INR of 7 the risks would outweigh the benefits of performing a tap  - when INR improves, we can consider a diagnostic tap but doubt it will   - would continue w/ current antibiotics to cover for post-flu pneumonia and tamiflu 73 yo M. PMH of ESRD on HD (MWF- full session) via LUE AVF, afib, mechanical mitral valve repair (on warfarin) presents with cough x 3 weeks found to be flu positive with multifocal pneumonia and b/l pleural effusions.     1) Pleural effusion  - POCUS showed small left and small to moderate pleural effusions and they were simple appearing  - effusion may be related to fluid overload in setting of ESRD or parapneumonic given the underlying pneumonia but the pt is non toxic appearing, afebrile and is clinically improving so it is doubtful that this is an empyema  - given the above a diagnostic tap is not urgent and with an INR of 7 the risks would outweigh the benefits of performing a tap  - when INR improves, we can consider a diagnostic tap but doubt it will   - would continue w/ current antibiotics to cover for post-flu pneumonia and tamiflu

## 2018-02-11 NOTE — PROGRESS NOTE ADULT - SUBJECTIVE AND OBJECTIVE BOX
CHIEF COMPLAINT: Patient is a 72y old  male who presents with a chief complaint of Cough (10 Feb 2018 03:15)      SUBJECTIVE / OVERNIGHT EVENTS:    Looks well. Sister, multiple family members at the bedside. Noted to be coughing profusely during the visit. No other complaints today.    Family concerned about poor appetite.    MEDICATIONS  (STANDING):  atorvastatin 40 milliGRAM(s) Oral at bedtime  cefepime  IVPB 1000 milliGRAM(s) IV Intermittent every 24 hours  donepezil 5 milliGRAM(s) Oral at bedtime  doxycycline IVPB      doxycycline IVPB 100 milliGRAM(s) IV Intermittent every 12 hours  folic acid 1 milliGRAM(s) Oral daily  lactobacillus acidophilus 1 Tablet(s) Oral every 12 hours  metoprolol succinate ER 25 milliGRAM(s) Oral daily  oseltamivir 30 milliGRAM(s) Oral <User Schedule>  sevelamer hydrochloride 2400 milliGRAM(s) Oral two times a day with meals    MEDICATIONS  (PRN):  guaiFENesin   Syrup  (Sugar-Free) 200 milliGRAM(s) Oral every 6 hours PRN Cough      VITALS:  T(F): 97.5 (18 @ 08:59), Max: 99 (02-10-18 @ 18:47)  HR: 98 (18 @ 08:59) (76 - 100)  BP: 136/73 (18 @ 08:59) (136/73 - 154/72)  RR: 17 (18 @ 08:59) (16 - 19)  SpO2: 95% (18 @ 08:59)      CAPILLARY BLOOD GLUCOSE    Output     I&O's Summary  T(F): 97.5 (18 @ 08:59), Max: 99 (02-10-18 @ 18:47)  HR: 98 (18 @ 08:59) (76 - 100)  BP: 136/73 (18 @ 08:59) (136/73 - 154/72)  RR: 17 (18 @ 08:59) (16 - 19)  SpO2: 95% (18 @ 08:59)    PHYSICAL EXAM:  GENERAL: NAD, well-developed  HEAD:  Atraumatic, Normocephalic  EYES: EOMI  NECK: Supple, No JVD  CHEST/LUNG: decreased breath sounds at both bases, crackles bilaterally  HEART: nl S1/S2  ABDOMEN: nondistended, soft  EXTREMITIES:  no LE edema  PSYCH: A&Ox3  NEUROLOGY: non-focal  SKIN: No rashes noted    LABS:              7.5                  133  | 24   | 43           6.37  >-----------< 169     ------------------------< 90                    23.2                 4.2  | 93   | 8.00                                         Ca 8.5   Mg 2.4   Ph x           TPro  7.3  /  Alb  3.0      TBili  0.9  /  DBili  x         AST  26  /  ALT  9             AlkPhos  56      INR: 7.38<HH>;    PT: 85.3 SEC<H>;    PTT: x                VB-09 @ 21:00  7.46/43/45/30/78.0/6.1        MICROBIOLOGY:    Culture - Blood (collected 2018 21:50)  Source: BLOOD VENOUS  Preliminary Report (10 Feb 2018 21:49):    NO ORGANISMS ISOLATED    NO ORGANISMS ISOLATED AT 24 HOURS    Culture - Blood (collected 2018 21:50)  Source: BLOOD PERIPHERAL  Preliminary Report (10 Feb 2018 21:49):    NO ORGANISMS ISOLATED    NO ORGANISMS ISOLATED AT 24 HOURS          RADIOLOGY & ADDITIONAL TESTS:    Imaging Personally Reviewed:        < from: CT Chest No Cont (02.10.18 @ 11:09) >  IMPRESSION:     Multifocal pneumonia.    Bilateral pleural effusions.    Cirrhosis.    < end of copied text >  [ ] Consultant(s) Notes Reviewed:  [ ] Care Discussed with Consultants/Other Providers:

## 2018-02-11 NOTE — CONSULT NOTE ADULT - SUBJECTIVE AND OBJECTIVE BOX
CHIEF COMPLAINT:    HPI:    72M with PMHx of ESRD on HD (MWF- full session) via LUE AVF, afib, mechanical mitral valve repair (on warfarin) p/w cough x 3 weeks.   Reports having a recent hospitalization last month for a bleeding fistula at OSH.   Patient has had a productive cough x3 weeks since last hospitalization. He became SOB today, so his family called EMS to bring to the ED per triage note. Patient wasn't sure why his family sent him to the hospital.   Denies any F/C, N/V, CP, headache, LE edema, or abdominal pain.   He had his regular dialysis this morning.       PAST MEDICAL & SURGICAL HISTORY:  Hypertension  ESRD (end stage renal disease) on dialysis  Atrial fibrillation  Mitral valve replaced: mechanical valve  A-V fistula      FAMILY HISTORY:  No pertinent family history in first degree relatives      SOCIAL HISTORY:  Smoking: [ ] Never Smoked [ ] Former Smoker (__ packs x ___ years) [ ] Current Smoker  (__ packs x ___ years)  Substance Use: [ ] Never Used [ ] Used ____  EtOH Use:  Marital Status: [ ] Single [ ]  [ ]  [ ]   Sexual History:   Occupation:  Recent Travel:  Country of Birth:  Advance Directives:    Allergies    No Known Allergies    Intolerances        HOME MEDICATIONS:    REVIEW OF SYSTEMS:  Constitutional: [ ] fevers [ ] chills [ ] weight loss [ ] weight gain  HEENT: [ ] dry eyes [ ] eye irritation [ ] postnasal drip [ ] nasal congestion  CV: [ ] chest pain [ ] orthopnea [ ] palpitations [ ] murmur  Resp: [ ] cough [ ] shortness of breath [ ] dyspnea [ ] wheezing [ ] sputum [ ] hemoptysis  GI: [ ] nausea [ ] vomiting [ ] diarrhea [ ] constipation [ ] abd pain [ ] dysphagia   [x ] All other systems negative  [ ] Unable to assess ROS because ________    OBJECTIVE:  ICU Vital Signs Last 24 Hrs  T(C): 36.4 (11 Feb 2018 08:59), Max: 37.2 (10 Feb 2018 18:47)  T(F): 97.5 (11 Feb 2018 08:59), Max: 99 (10 Feb 2018 18:47)  HR: 98 (11 Feb 2018 08:59) (76 - 100)  BP: 136/73 (11 Feb 2018 08:59) (136/73 - 154/72)  BP(mean): --  ABP: --  ABP(mean): --  RR: 17 (11 Feb 2018 08:59) (16 - 19)  SpO2: 95% (11 Feb 2018 08:59) (95% - 100%)        CAPILLARY BLOOD GLUCOSE          PHYSICAL EXAM:  General:   HEENT:   Lymph Nodes:  Neck:   Respiratory:   Cardiovascular:   Abdomen:   Extremities:   Skin:   Neurological:  Psychiatry:    HOSPITAL MEDICATIONS:  MEDICATIONS  (STANDING):  atorvastatin 40 milliGRAM(s) Oral at bedtime  cefepime  IVPB 1000 milliGRAM(s) IV Intermittent every 24 hours  donepezil 5 milliGRAM(s) Oral at bedtime  doxycycline IVPB      doxycycline IVPB 100 milliGRAM(s) IV Intermittent every 12 hours  folic acid 1 milliGRAM(s) Oral daily  lactobacillus acidophilus 1 Tablet(s) Oral every 12 hours  metoprolol succinate ER 25 milliGRAM(s) Oral daily  oseltamivir 30 milliGRAM(s) Oral <User Schedule>  sevelamer hydrochloride 2400 milliGRAM(s) Oral two times a day with meals    MEDICATIONS  (PRN):  guaiFENesin   Syrup  (Sugar-Free) 200 milliGRAM(s) Oral every 6 hours PRN Cough      LABS:                        7.5    6.37  )-----------( 169      ( 11 Feb 2018 06:10 )             23.2     Hgb Trend: 7.5<--, 7.0<--, 7.5<--, 7.2<--  02-11    133<L>  |  93<L>  |  43<H>  ----------------------------<  90  4.2   |  24  |  8.00<H>    Ca    8.5      11 Feb 2018 06:10  Phos  3.8     02-10  Mg     2.4     02-11    TPro  7.3  /  Alb  3.0<L>  /  TBili  0.9  /  DBili  x   /  AST  26  /  ALT  9   /  AlkPhos  56  02-09    Creatinine Trend: 8.00<--, 6.71<--, 5.01<--  PT/INR - ( 11 Feb 2018 06:10 )   PT: 85.3 SEC;   INR: 7.38          PTT - ( 09 Feb 2018 21:47 )  PTT:60.9 SEC      Venous Blood Gas:  02-09 @ 21:00  7.46/43/45/30/78.0  VBG Lactate: 1.4      MICROBIOLOGY:     RADIOLOGY:  [ ] Reviewed and interpreted by me CHIEF COMPLAINT: SOB    HPI:  73 yo M. PMH of ESRD on HD (MWF- full session) via LUE AVF, afib, mechanical mitral valve repair (on warfarin) presents with cough x 3 weeks. Cough is productive and on the day of admission he became more SOB and his family called EMS. He had a recent admission at OSH for bleeding AVF and since returning home he has been coughing. No missed HD sessions. Found to febrile in the ED and in afib w/ RVR. Flu positive. CT chest showed b/l consolidations and small b/l pleural effusions R>L.       PAST MEDICAL & SURGICAL HISTORY:  Hypertension  ESRD (end stage renal disease) on dialysis  Atrial fibrillation  Mitral valve replaced: mechanical valve  A-V fistula      FAMILY HISTORY:  No pertinent family history in first degree relatives      SOCIAL HISTORY:  Smoking: [X ] Never Smoked [ ] Former Smoker (__ packs x ___ years) [ ] Current Smoker  (__ packs x ___ years)  Substance Use: [ X] Never Used [ ] Used ____  EtOH Use: denies  Marital Status: [ ] Single [X ]  [ ]  [ ]     Allergies    No Known Allergies    Intolerances        HOME MEDICATIONS:  · 	donepezil 5 mg oral tablet: 1 tab(s) orally once a day (at bedtime), Last Dose Taken:    · 	atorvastatin 40 mg oral tablet: 1 tab(s) orally once a day, Last Dose Taken:    · 	Toprol-XL 25 mg oral tablet, extended release: 1 tab(s) orally once a day  · 	Coumadin 3 mg oral tablet: 3.5 milligram(s) orally once a day, Last Dose Taken:    · 	Renagel 800 mg oral tablet: 3 tab(s) orally 3 times a day, Last Dose Taken:    · 	folic acid: 5 milligram(s) orally once a day, Last Dose Taken:    · 	Norvasc 5 mg oral tablet: 1 tab(s) orally once a day, Last Dose Taken:        REVIEW OF SYSTEMS:  Constitutional: [ ] fevers [ ] chills [ ] weight loss [ ] weight gain  HEENT: [ ] dry eyes [ ] eye irritation [ ] postnasal drip [ ] nasal congestion  CV: [ ] chest pain [ ] orthopnea [ ] palpitations [ ] murmur  Resp: [ ] cough [ ] shortness of breath [ ] dyspnea [ ] wheezing [ ] sputum [ ] hemoptysis  GI: [ ] nausea [ ] vomiting [ ] diarrhea [ ] constipation [ ] abd pain [ ] dysphagia   [x ] All other systems negative  [ ] Unable to assess ROS because ________    OBJECTIVE:  ICU Vital Signs Last 24 Hrs  T(C): 36.4 (11 Feb 2018 08:59), Max: 37.2 (10 Feb 2018 18:47)  T(F): 97.5 (11 Feb 2018 08:59), Max: 99 (10 Feb 2018 18:47)  HR: 98 (11 Feb 2018 08:59) (76 - 100)  BP: 136/73 (11 Feb 2018 08:59) (136/73 - 154/72)  BP(mean): --  ABP: --  ABP(mean): --  RR: 17 (11 Feb 2018 08:59) (16 - 19)  SpO2: 95% (11 Feb 2018 08:59) (95% - 100%)        CAPILLARY BLOOD GLUCOSE          PHYSICAL EXAM:  General:   HEENT:   Lymph Nodes:  Neck:   Respiratory:   Cardiovascular:   Abdomen:   Extremities:   Skin:   Neurological:  Psychiatry:    HOSPITAL MEDICATIONS:  MEDICATIONS  (STANDING):  atorvastatin 40 milliGRAM(s) Oral at bedtime  cefepime  IVPB 1000 milliGRAM(s) IV Intermittent every 24 hours  donepezil 5 milliGRAM(s) Oral at bedtime  doxycycline IVPB      doxycycline IVPB 100 milliGRAM(s) IV Intermittent every 12 hours  folic acid 1 milliGRAM(s) Oral daily  lactobacillus acidophilus 1 Tablet(s) Oral every 12 hours  metoprolol succinate ER 25 milliGRAM(s) Oral daily  oseltamivir 30 milliGRAM(s) Oral <User Schedule>  sevelamer hydrochloride 2400 milliGRAM(s) Oral two times a day with meals    MEDICATIONS  (PRN):  guaiFENesin   Syrup  (Sugar-Free) 200 milliGRAM(s) Oral every 6 hours PRN Cough      LABS:                        7.5    6.37  )-----------( 169      ( 11 Feb 2018 06:10 )             23.2     Hgb Trend: 7.5<--, 7.0<--, 7.5<--, 7.2<--  02-11    133<L>  |  93<L>  |  43<H>  ----------------------------<  90  4.2   |  24  |  8.00<H>    Ca    8.5      11 Feb 2018 06:10  Phos  3.8     02-10  Mg     2.4     02-11    TPro  7.3  /  Alb  3.0<L>  /  TBili  0.9  /  DBili  x   /  AST  26  /  ALT  9   /  AlkPhos  56  02-09    Creatinine Trend: 8.00<--, 6.71<--, 5.01<--  PT/INR - ( 11 Feb 2018 06:10 )   PT: 85.3 SEC;   INR: 7.38          PTT - ( 09 Feb 2018 21:47 )  PTT:60.9 SEC      Venous Blood Gas:  02-09 @ 21:00  7.46/43/45/30/78.0  VBG Lactate: 1.4      MICROBIOLOGY:       RADIOLOGY:  [ x] Reviewed and interpreted by me CHIEF COMPLAINT: SOB    HPI:  71 yo M. PMH of ESRD on HD (MWF- full session) via LUE AVF, afib, mechanical mitral valve repair (on warfarin) presents with cough x 3 weeks. Cough is productive and on the day of admission he became more SOB and his family called EMS. The days prior to admission he had decreased PO intake and was feeling very weak and fatigued. He had a recent admission at OSH for bleeding AVF and since returning home he has been coughing. No missed HD sessions. Found to febrile in the ED and in afib w/ RVR. Flu positive. CT chest showed b/l consolidations and small b/l pleural effusions R>L. He is feeling better today - cough has improved and his appetite has picked up.       PAST MEDICAL & SURGICAL HISTORY:  Hypertension  ESRD (end stage renal disease) on dialysis  Atrial fibrillation  Mitral valve replaced: mechanical valve  A-V fistula      FAMILY HISTORY:  No pertinent family history in first degree relatives      SOCIAL HISTORY:  Smoking: [X ] Never Smoked [ ] Former Smoker (__ packs x ___ years) [ ] Current Smoker  (__ packs x ___ years)  Substance Use: [ X] Never Used [ ] Used ____  EtOH Use: denies  Marital Status: [ ] Single [X ]  [ ]  [ ]     Allergies    No Known Allergies    Intolerances        HOME MEDICATIONS:  · 	donepezil 5 mg oral tablet: 1 tab(s) orally once a day (at bedtime), Last Dose Taken:    · 	atorvastatin 40 mg oral tablet: 1 tab(s) orally once a day, Last Dose Taken:    · 	Toprol-XL 25 mg oral tablet, extended release: 1 tab(s) orally once a day  · 	Coumadin 3 mg oral tablet: 3.5 milligram(s) orally once a day, Last Dose Taken:    · 	Renagel 800 mg oral tablet: 3 tab(s) orally 3 times a day, Last Dose Taken:    · 	folic acid: 5 milligram(s) orally once a day, Last Dose Taken:    · 	Norvasc 5 mg oral tablet: 1 tab(s) orally once a day, Last Dose Taken:        REVIEW OF SYSTEMS:  Constitutional: [ -] fevers [- ] chills [ ] weight loss [ ] weight gain  HEENT: [ ] dry eyes [ ] eye irritation [ ] postnasal drip [ ] nasal congestion  CV: [- ] chest pain [ -] orthopnea [ -] palpitations [ ] murmur  Resp: [+ ] cough [ -] shortness of breath [ -] dyspnea [ ] wheezing [- ] sputum [ ] hemoptysis  GI: [ -] nausea [- ] vomiting [ ] diarrhea [ ] constipation [ ] abd pain [ ] dysphagia   [x ] All other systems negative  [ ] Unable to assess ROS because ________    OBJECTIVE:  ICU Vital Signs Last 24 Hrs  T(C): 36.4 (11 Feb 2018 08:59), Max: 37.2 (10 Feb 2018 18:47)  T(F): 97.5 (11 Feb 2018 08:59), Max: 99 (10 Feb 2018 18:47)  HR: 98 (11 Feb 2018 08:59) (76 - 100)  BP: 136/73 (11 Feb 2018 08:59) (136/73 - 154/72)  BP(mean): --  ABP: --  ABP(mean): --  RR: 17 (11 Feb 2018 08:59) (16 - 19)  SpO2: 95% (11 Feb 2018 08:59) (95% - 100%)        CAPILLARY BLOOD GLUCOSE          PHYSICAL EXAM:  General: NAD, non-toxic appearing  HEENT: MMM  Neck: supple  Respiratory: diminished BS b/l bases, inspiratory rales b/l, no wheezing  Cardiovascular: s1 s1 RRR  Abdomen: soft, non tender  Extremities: warm, no clubbing or cyanosis, no edema  Neurological: non focal  Psychiatry: AAOX3    HOSPITAL MEDICATIONS:  MEDICATIONS  (STANDING):  atorvastatin 40 milliGRAM(s) Oral at bedtime  cefepime  IVPB 1000 milliGRAM(s) IV Intermittent every 24 hours  donepezil 5 milliGRAM(s) Oral at bedtime  doxycycline IVPB      doxycycline IVPB 100 milliGRAM(s) IV Intermittent every 12 hours  folic acid 1 milliGRAM(s) Oral daily  lactobacillus acidophilus 1 Tablet(s) Oral every 12 hours  metoprolol succinate ER 25 milliGRAM(s) Oral daily  oseltamivir 30 milliGRAM(s) Oral <User Schedule>  sevelamer hydrochloride 2400 milliGRAM(s) Oral two times a day with meals    MEDICATIONS  (PRN):  guaiFENesin   Syrup  (Sugar-Free) 200 milliGRAM(s) Oral every 6 hours PRN Cough      LABS:                        7.5    6.37  )-----------( 169      ( 11 Feb 2018 06:10 )             23.2     Hgb Trend: 7.5<--, 7.0<--, 7.5<--, 7.2<--  02-11    133<L>  |  93<L>  |  43<H>  ----------------------------<  90  4.2   |  24  |  8.00<H>    Ca    8.5      11 Feb 2018 06:10  Phos  3.8     02-10  Mg     2.4     02-11    TPro  7.3  /  Alb  3.0<L>  /  TBili  0.9  /  DBili  x   /  AST  26  /  ALT  9   /  AlkPhos  56  02-09    Creatinine Trend: 8.00<--, 6.71<--, 5.01<--  PT/INR - ( 11 Feb 2018 06:10 )   PT: 85.3 SEC;   INR: 7.38          PTT - ( 09 Feb 2018 21:47 )  PTT:60.9 SEC      Venous Blood Gas:  02-09 @ 21:00  7.46/43/45/30/78.0  VBG Lactate: 1.4      MICROBIOLOGY:       RADIOLOGY:  [ x] Reviewed and interpreted by me

## 2018-02-12 DIAGNOSIS — D64.9 ANEMIA, UNSPECIFIED: ICD-10-CM

## 2018-02-12 DIAGNOSIS — I10 ESSENTIAL (PRIMARY) HYPERTENSION: ICD-10-CM

## 2018-02-12 LAB
BASOPHILS # BLD AUTO: 0.01 K/UL — SIGNIFICANT CHANGE UP (ref 0–0.2)
BASOPHILS NFR BLD AUTO: 0.2 % — SIGNIFICANT CHANGE UP (ref 0–2)
BUN SERPL-MCNC: 57 MG/DL — HIGH (ref 7–23)
CALCIUM SERPL-MCNC: 8.4 MG/DL — SIGNIFICANT CHANGE UP (ref 8.4–10.5)
CHLORIDE SERPL-SCNC: 90 MMOL/L — LOW (ref 98–107)
CO2 SERPL-SCNC: 21 MMOL/L — LOW (ref 22–31)
CREAT SERPL-MCNC: 10.29 MG/DL — HIGH (ref 0.5–1.3)
EOSINOPHIL # BLD AUTO: 0.39 K/UL — SIGNIFICANT CHANGE UP (ref 0–0.5)
EOSINOPHIL NFR BLD AUTO: 7.6 % — HIGH (ref 0–6)
GLUCOSE SERPL-MCNC: 96 MG/DL — SIGNIFICANT CHANGE UP (ref 70–99)
HBV SURFACE AG SER-ACNC: HIGH
HBV SURFACE AG SER-ACNC: REACTIVE — SIGNIFICANT CHANGE UP
HCT VFR BLD CALC: 20.5 % — CRITICAL LOW (ref 39–50)
HGB BLD-MCNC: 6.9 G/DL — CRITICAL LOW (ref 13–17)
IMM GRANULOCYTES # BLD AUTO: 0.03 # — SIGNIFICANT CHANGE UP
IMM GRANULOCYTES NFR BLD AUTO: 0.6 % — SIGNIFICANT CHANGE UP (ref 0–1.5)
INR BLD: 7.67 — CRITICAL HIGH (ref 0.88–1.17)
IRON SATN MFR SERPL: 147 UG/DL — LOW (ref 155–535)
IRON SATN MFR SERPL: 30 UG/DL — LOW (ref 45–165)
LYMPHOCYTES # BLD AUTO: 0.34 K/UL — LOW (ref 1–3.3)
LYMPHOCYTES # BLD AUTO: 6.6 % — LOW (ref 13–44)
MAGNESIUM SERPL-MCNC: 2.5 MG/DL — SIGNIFICANT CHANGE UP (ref 1.6–2.6)
MCHC RBC-ENTMCNC: 31.9 PG — SIGNIFICANT CHANGE UP (ref 27–34)
MCHC RBC-ENTMCNC: 33.7 % — SIGNIFICANT CHANGE UP (ref 32–36)
MCV RBC AUTO: 94.9 FL — SIGNIFICANT CHANGE UP (ref 80–100)
MONOCYTES # BLD AUTO: 0.38 K/UL — SIGNIFICANT CHANGE UP (ref 0–0.9)
MONOCYTES NFR BLD AUTO: 7.4 % — SIGNIFICANT CHANGE UP (ref 2–14)
NEUTROPHILS # BLD AUTO: 3.99 K/UL — SIGNIFICANT CHANGE UP (ref 1.8–7.4)
NEUTROPHILS NFR BLD AUTO: 77.6 % — HIGH (ref 43–77)
NRBC # FLD: 0 — SIGNIFICANT CHANGE UP
PLATELET # BLD AUTO: 173 K/UL — SIGNIFICANT CHANGE UP (ref 150–400)
PMV BLD: 10.3 FL — SIGNIFICANT CHANGE UP (ref 7–13)
POTASSIUM SERPL-MCNC: 4.5 MMOL/L — SIGNIFICANT CHANGE UP (ref 3.5–5.3)
POTASSIUM SERPL-SCNC: 4.5 MMOL/L — SIGNIFICANT CHANGE UP (ref 3.5–5.3)
PROTHROM AB SERPL-ACNC: 88.7 SEC — HIGH (ref 9.8–13.1)
RBC # BLD: 2.16 M/UL — LOW (ref 4.2–5.8)
RBC # FLD: 14.7 % — HIGH (ref 10.3–14.5)
SODIUM SERPL-SCNC: 129 MMOL/L — LOW (ref 135–145)
UIBC SERPL-MCNC: 117 UG/DL — SIGNIFICANT CHANGE UP (ref 110–370)
VANCOMYCIN FLD-MCNC: 2.2 UG/ML — SIGNIFICANT CHANGE UP
WBC # BLD: 5.14 K/UL — SIGNIFICANT CHANGE UP (ref 3.8–10.5)
WBC # FLD AUTO: 5.14 K/UL — SIGNIFICANT CHANGE UP (ref 3.8–10.5)

## 2018-02-12 PROCEDURE — 99223 1ST HOSP IP/OBS HIGH 75: CPT | Mod: GC

## 2018-02-12 PROCEDURE — 99233 SBSQ HOSP IP/OBS HIGH 50: CPT

## 2018-02-12 PROCEDURE — 99233 SBSQ HOSP IP/OBS HIGH 50: CPT | Mod: GC

## 2018-02-12 RX ADMIN — Medication 25 MILLIGRAM(S): at 06:33

## 2018-02-12 RX ADMIN — DONEPEZIL HYDROCHLORIDE 5 MILLIGRAM(S): 10 TABLET, FILM COATED ORAL at 22:54

## 2018-02-12 RX ADMIN — Medication 1 TABLET(S): at 17:05

## 2018-02-12 RX ADMIN — ATORVASTATIN CALCIUM 40 MILLIGRAM(S): 80 TABLET, FILM COATED ORAL at 22:54

## 2018-02-12 RX ADMIN — Medication 1 TABLET(S): at 06:33

## 2018-02-12 RX ADMIN — Medication 1 MILLIGRAM(S): at 11:45

## 2018-02-12 RX ADMIN — SEVELAMER CARBONATE 2400 MILLIGRAM(S): 2400 POWDER, FOR SUSPENSION ORAL at 08:24

## 2018-02-12 RX ADMIN — Medication 200 MILLIGRAM(S): at 17:05

## 2018-02-12 RX ADMIN — SEVELAMER CARBONATE 2400 MILLIGRAM(S): 2400 POWDER, FOR SUSPENSION ORAL at 17:05

## 2018-02-12 RX ADMIN — Medication 30 MILLIGRAM(S): at 22:55

## 2018-02-12 NOTE — PROGRESS NOTE ADULT - ATTENDING COMMENTS
I have seen and examined the patient. I agree with the above history, physical exam, and plan of care except for as detailed below.    71 y/o M w/ESRD p/w bilateral PNA found to have influenza. Noted to have bilateral pleural effusions on imaging. Patient currently clinically well, afebrile, normal WBC count. Effusions likely secondary to volume overload, doubt infected pleural space. No plan for thora at this time, but will consider thoracentesis if patient deteriorates, develops elevated WBC count or fever, and supratherapeutic INR improves.

## 2018-02-12 NOTE — CONSULT NOTE ADULT - PROBLEM SELECTOR RECOMMENDATION 3
Anemia in the setting of ESRD. Will start CHRISTEN with HD. Monitor H/H Anemia in the setting of ESRD. Will start CHRISTEN with HD. Monitor H/H  Addendum-repeat hemoglobin came back at 6.9, can transfuse PRBC with HD today.

## 2018-02-12 NOTE — PHYSICAL THERAPY INITIAL EVALUATION ADULT - PATIENT PROFILE REVIEW, REHAB EVAL
yes/Activity - ambulate with assistance. Supratherapeutic INR noted, spoke with RN Lori Grossman (Angelika), pt. ok for bed mobility activities.

## 2018-02-12 NOTE — CONSULT NOTE ADULT - SUBJECTIVE AND OBJECTIVE BOX
[facility] DIVISION OF KIDNEY DISEASES AND HYPERTENSION -- INITIAL CONSULT NOTE  --------------------------------------------------------------------------------  HPI: This is a 71 y/o M with a PMHx of HTN, MVR ESRD on HD MWF admitted for influenza PNA on IV ABX. Pt. gets HD at [facility] and Dr. Goldberg is his nephrologist. Pt. last got dialysis on 2/9, as per pt. tolerated well without complications. Currently pt. resting in bed. Pt. states he has some SOB denies any CP, N/V.       PAST HISTORY  --------------------------------------------------------------------------------  PAST MEDICAL & SURGICAL HISTORY:  Hypertension  ESRD (end stage renal disease) on dialysis  Atrial fibrillation  Mitral valve replaced: mechanical valve  A-V fistula    FAMILY HISTORY:  No pertinent family history in first degree relatives    PAST SOCIAL HISTORY: No history of alcohol, drugs or tobacco use     ALLERGIES & MEDICATIONS  --------------------------------------------------------------------------------  Allergies    No Known Allergies    Intolerances      Standing Inpatient Medications  atorvastatin 40 milliGRAM(s) Oral at bedtime  cefepime Injectable 1000 milliGRAM(s) IntraMuscular every 24 hours  donepezil 5 milliGRAM(s) Oral at bedtime  doxycycline hyclate Capsule 100 milliGRAM(s) Oral every 12 hours  folic acid 1 milliGRAM(s) Oral daily  lactobacillus acidophilus 1 Tablet(s) Oral every 12 hours  metoprolol succinate ER 25 milliGRAM(s) Oral daily  oseltamivir 30 milliGRAM(s) Oral <User Schedule>  sevelamer hydrochloride 2400 milliGRAM(s) Oral two times a day with meals    PRN Inpatient Medications  guaiFENesin   Syrup  (Sugar-Free) 200 milliGRAM(s) Oral every 6 hours PRN      REVIEW OF SYSTEMS  --------------------------------------------------------------------------------  Gen: + weakness  Skin: No rashes  Head/Eyes/Ears/Mouth: No headache;  Respiratory: + mild SOB   CV: No chest pain,   GI: No abdominal pain,   : No dysuria,  MSK: No edema      All other systems were reviewed and are negative, except as noted.    VITALS/PHYSICAL EXAM  --------------------------------------------------------------------------------  T(C): 36.8 (02-12-18 @ 04:52), Max: 37.1 (02-11-18 @ 14:47)  HR: 84 (02-12-18 @ 04:52) (84 - 100)  BP: 150/71 (02-12-18 @ 04:52) (136/73 - 155/68)  RR: 16 (02-12-18 @ 04:52) (16 - 18)  SpO2: 98% (02-11-18 @ 21:37) (95% - 98%)  Wt(kg): --        Physical Exam:  	Gen: NAD,   	HEENT: PERRL,   	Pulm: mild bibasilar crackles   	CV: RRR,   	Abd:  soft  	: No suprapubic tenderness  	LE: Warm,  no edema  	Neuro: awake and alert   	Psych: Normal affect and mood  	Skin: Warm, without rashes  	Vascular access: LUE AVF +thrill/bruit     LABS/STUDIES  --------------------------------------------------------------------------------              7.5    6.37  >-----------<  169      [02-11-18 @ 06:10]              23.2     133  |  93  |  43  ----------------------------<  90      [02-11-18 @ 06:10]  4.2   |  24  |  8.00        Ca     8.5     [02-11-18 @ 06:10]      Mg     2.4     [02-11-18 @ 06:10]      Phos  3.8     [02-10-18 @ 14:22]      PT/INR: PT 85.3 , INR 7.38       [02-11-18 @ 06:10]      Creatinine Trend:  SCr 8.00 [02-11 @ 06:10]  SCr 6.71 [02-10 @ 14:22]  SCr 5.01 [02-09 @ 21:22]        Ferritin 1670      [02-11-18 @ 06:10]  TSH 4.49      [02-10-18 @ 14:22]    HBsAb 41.6      [07-02-14 @ 23:32]  HBsAg PRELIM POSITIVE TEST REPEATED IN DUPLICATE  SUPERVISOR NOTIFIED      [07-02-14 @ 23:32]  HBcAb POSITIVE      [07-02-14 @ 23:32]

## 2018-02-12 NOTE — PHYSICAL THERAPY INITIAL EVALUATION ADULT - PERTINENT HX OF CURRENT PROBLEM, REHAB EVAL
Pt. admitted for cough, found to have pneumonia, influenza. PMH of ESRD on HD, LUE AVF, afib, mechanical mitral valve repair.

## 2018-02-12 NOTE — PHYSICAL THERAPY INITIAL EVALUATION ADULT - ADDITIONAL COMMENTS
PLOF difficult to obtain, pt. [place] and had difficulty with answering questions.     Pt. was left supine in bed, NAD, call ortiz within reach. RN Lori aware of pt. participation in PT.

## 2018-02-12 NOTE — CONSULT NOTE ADULT - PROBLEM SELECTOR RECOMMENDATION 9
ESRD on HD MWF. Pt. last had HD on 2/9. As per pt, tolerated well without complications. Pt. clinically stable. Will arrange for maintenance HD today. Monitor BP and labs

## 2018-02-12 NOTE — PHYSICAL THERAPY INITIAL EVALUATION ADULT - GENERAL OBSERVATIONS, REHAB EVAL
Patient received supine in bed, NAD, +NC, droplet precautions, [place]. Patient agreed to EVALUATION from Physical Therapist.

## 2018-02-12 NOTE — PHYSICAL THERAPY INITIAL EVALUATION ADULT - PRECAUTIONS/LIMITATIONS, REHAB EVAL
oxygen therapy device and L/min/fall precautions/cardiac precautions/droplet precautions droplet precautions, bleeding precautions (supratherapeutic INR)/cardiac precautions/fall precautions/oxygen therapy device and L/min

## 2018-02-12 NOTE — PHYSICAL THERAPY INITIAL EVALUATION ADULT - CRITERIA FOR SKILLED THERAPEUTIC INTERVENTIONS
anticipated discharge recommendation/rehab potential/impairments found/predicted duration of therapy intervention/risk reduction/prevention/therapy frequency/functional limitations in following categories

## 2018-02-12 NOTE — PROGRESS NOTE ADULT - SUBJECTIVE AND OBJECTIVE BOX
Interval Events:     REVIEW OF SYSTEMS:  Constitutional: [ -] fevers [- ] chills [ ] weight loss [ ] weight gain  HEENT: [ ] dry eyes [ ] eye irritation [ ] postnasal drip [ ] nasal congestion  CV: [- ] chest pain [ -] orthopnea [ -] palpitations [ ] murmur  Resp: [+ ] cough [ -] shortness of breath [ -] dyspnea [ ] wheezing [- ] sputum [ ] hemoptysis  GI: [ -] nausea [- ] vomiting [ ] diarrhea [ ] constipation [ ] abd pain [ ] dysphagia   [x ] All other systems negative  [ ] Unable to assess ROS because ________    OBJECTIVE:  ICU Vital Signs Last 24 Hrs  T(C): 36.8 (12 Feb 2018 04:52), Max: 37.1 (11 Feb 2018 14:47)  T(F): 98.3 (12 Feb 2018 04:52), Max: 98.8 (11 Feb 2018 14:47)  HR: 84 (12 Feb 2018 04:52) (84 - 100)  BP: 150/71 (12 Feb 2018 04:52) (136/73 - 155/68)  RR: 16 (12 Feb 2018 04:52) (16 - 18)  SpO2: 98% (11 Feb 2018 21:37) (95% - 98%)      PHYSICAL EXAM:  General: NAD, non-toxic appearing  HEENT: MMM  Neck: supple  Respiratory: diminished BS b/l bases, inspiratory rales b/l, no wheezing  Cardiovascular: s1 s1 RRR  Abdomen: soft, non tender  Extremities: warm, no clubbing or cyanosis, no edema  Neurological: non focal  Psychiatry: AAOX3    HOSPITAL MEDICATIONS:  MEDICATIONS  (STANDING):  atorvastatin 40 milliGRAM(s) Oral at bedtime  cefepime  IVPB 1000 milliGRAM(s) IV Intermittent every 24 hours  donepezil 5 milliGRAM(s) Oral at bedtime  doxycycline IVPB      doxycycline IVPB 100 milliGRAM(s) IV Intermittent every 12 hours  folic acid 1 milliGRAM(s) Oral daily  lactobacillus acidophilus 1 Tablet(s) Oral every 12 hours  metoprolol succinate ER 25 milliGRAM(s) Oral daily  oseltamivir 30 milliGRAM(s) Oral <User Schedule>  sevelamer hydrochloride 2400 milliGRAM(s) Oral two times a day with meals    MEDICATIONS  (PRN):  guaiFENesin   Syrup  (Sugar-Free) 200 milliGRAM(s) Oral every 6 hours PRN Cough      LABS:                        7.5    6.37  )-----------( 169      ( 11 Feb 2018 06:10 )             23.2     Hgb Trend: 7.5<--, 7.0<--, 7.5<--, 7.2<--  02-11    133<L>  |  93<L>  |  43<H>  ----------------------------<  90  4.2   |  24  |  8.00<H>    Ca    8.5      11 Feb 2018 06:10  Phos  3.8     02-10  Mg     2.4     02-11    TPro  7.3  /  Alb  3.0<L>  /  TBili  0.9  /  DBili  x   /  AST  26  /  ALT  9   /  AlkPhos  56  02-09    Creatinine Trend: 8.00<--, 6.71<--, 5.01<--  PT/INR - ( 11 Feb 2018 06:10 )   PT: 85.3 SEC;   INR: 7.38          PTT - ( 09 Feb 2018 21:47 )  PTT:60.9 SEC      Venous Blood Gas:  02-09 @ 21:00  7.46/43/45/30/78.0  VBG Lactate: 1.4      MICROBIOLOGY:       RADIOLOGY:  [ x] Reviewed and interpreted by me    Bilateral multifocal air space opacities consistent with multifocal pneumonia. Bilateral PLEF, Right (small-moderate), left (small) Interval Events: No acute events noted overnight. Patient appears comfortable. Still with cough.     REVIEW OF SYSTEMS:  Constitutional: [ -] fevers [- ] chills [ ] weight loss [ ] weight gain  HEENT: [ ] dry eyes [ ] eye irritation [ ] postnasal drip [ ] nasal congestion  CV: [- ] chest pain [ -] orthopnea [ -] palpitations [ ] murmur  Resp: [+ ] cough [ -] shortness of breath [ -] dyspnea [ ] wheezing [- ] sputum [ ] hemoptysis  GI: [ -] nausea [- ] vomiting [ ] diarrhea [ ] constipation [ ] abd pain [ ] dysphagia   [x ] All other systems negative  [ ] Unable to assess ROS because ________    OBJECTIVE:  ICU Vital Signs Last 24 Hrs  T(C): 36.8 (12 Feb 2018 04:52), Max: 37.1 (11 Feb 2018 14:47)  T(F): 98.3 (12 Feb 2018 04:52), Max: 98.8 (11 Feb 2018 14:47)  HR: 84 (12 Feb 2018 04:52) (84 - 100)  BP: 150/71 (12 Feb 2018 04:52) (136/73 - 155/68)  RR: 16 (12 Feb 2018 04:52) (16 - 18)  SpO2: 98% (11 Feb 2018 21:37) (95% - 98%)      PHYSICAL EXAM:  General: NAD, non-toxic appearing  HEENT: MMM  Neck: supple  Respiratory: diminished BS b/l bases, inspiratory rales b/l, no wheezing  Cardiovascular: s1 s1 RRR  Abdomen: soft, non tender  Extremities: warm, no clubbing or cyanosis, no edema  Neurological: non focal  Psychiatry: AAOX3    HOSPITAL MEDICATIONS:  MEDICATIONS  (STANDING):  atorvastatin 40 milliGRAM(s) Oral at bedtime  cefepime  IVPB 1000 milliGRAM(s) IV Intermittent every 24 hours  donepezil 5 milliGRAM(s) Oral at bedtime  doxycycline IVPB      doxycycline IVPB 100 milliGRAM(s) IV Intermittent every 12 hours  folic acid 1 milliGRAM(s) Oral daily  lactobacillus acidophilus 1 Tablet(s) Oral every 12 hours  metoprolol succinate ER 25 milliGRAM(s) Oral daily  oseltamivir 30 milliGRAM(s) Oral <User Schedule>  sevelamer hydrochloride 2400 milliGRAM(s) Oral two times a day with meals    MEDICATIONS  (PRN):  guaiFENesin   Syrup  (Sugar-Free) 200 milliGRAM(s) Oral every 6 hours PRN Cough      LABS:                        7.5    6.37  )-----------( 169      ( 11 Feb 2018 06:10 )             23.2     Hgb Trend: 7.5<--, 7.0<--, 7.5<--, 7.2<--  02-11    133<L>  |  93<L>  |  43<H>  ----------------------------<  90  4.2   |  24  |  8.00<H>    Ca    8.5      11 Feb 2018 06:10  Phos  3.8     02-10  Mg     2.4     02-11    TPro  7.3  /  Alb  3.0<L>  /  TBili  0.9  /  DBili  x   /  AST  26  /  ALT  9   /  AlkPhos  56  02-09    Creatinine Trend: 8.00<--, 6.71<--, 5.01<--  PT/INR - ( 11 Feb 2018 06:10 )   PT: 85.3 SEC;   INR: 7.38          PTT - ( 09 Feb 2018 21:47 )  PTT:60.9 SEC      Venous Blood Gas:  02-09 @ 21:00  7.46/43/45/30/78.0  VBG Lactate: 1.4      MICROBIOLOGY:       RADIOLOGY:  [ x] Reviewed and interpreted by me    Bilateral multifocal air space opacities consistent with multifocal pneumonia. Bilateral PLEF, Right (small-moderate), left (small)

## 2018-02-12 NOTE — PROGRESS NOTE ADULT - SUBJECTIVE AND OBJECTIVE BOX
CHIEF COMPLAINT: Patient is a 72y old  male who presents with a chief complaint of Cough (10 Feb 2018 03:15)      SUBJECTIVE / OVERNIGHT EVENTS:    Feeling well. Denies any complaints today. Denies any melena, hematochezia.    MEDICATIONS  (STANDING):  atorvastatin 40 milliGRAM(s) Oral at bedtime  donepezil 5 milliGRAM(s) Oral at bedtime  folic acid 1 milliGRAM(s) Oral daily  lactobacillus acidophilus 1 Tablet(s) Oral every 12 hours  metoprolol succinate ER 25 milliGRAM(s) Oral daily  oseltamivir 30 milliGRAM(s) Oral <User Schedule>  sevelamer hydrochloride 2400 milliGRAM(s) Oral two times a day with meals    MEDICATIONS  (PRN):  guaiFENesin   Syrup  (Sugar-Free) 200 milliGRAM(s) Oral every 6 hours PRN Cough      VITALS:  T(F): 98.3 (02-12-18 @ 04:52), Max: 98.8 (02-11-18 @ 14:47)  HR: 84 (02-12-18 @ 04:52) (84 - 91)  BP: 150/71 (02-12-18 @ 04:52) (146/95 - 155/68)  RR: 16 (02-12-18 @ 04:52) (16 - 18)  SpO2: 98% (02-11-18 @ 21:37)      CAPILLARY BLOOD GLUCOSE    Output     I&O's Summary  T(F): 98.3 (02-12-18 @ 04:52), Max: 98.8 (02-11-18 @ 14:47)  HR: 84 (02-12-18 @ 04:52) (84 - 91)  BP: 150/71 (02-12-18 @ 04:52) (146/95 - 155/68)  RR: 16 (02-12-18 @ 04:52) (16 - 18)  SpO2: 98% (02-11-18 @ 21:37)    PHYSICAL EXAM:  GENERAL: NAD, well-developed  HEAD:  Atraumatic, Normocephalic  EYES: EOMI  NECK: Supple, No JVD  CHEST/LUNG: nonlabored breathing  HEART: nl S1/S2  ABDOMEN: nondistended, soft  EXTREMITIES:  no LE edema  NEUROLOGY: non-focal  SKIN: No rashes noted    LABS:              6.9                  129  | 21   | 57           5.14  >-----------< 173     ------------------------< 96                    20.5                 4.5  | 90   | 10.29                                        Ca 8.4   Mg 2.5   Ph x            INR: 7.67<HH>;    PT: 88.7 SEC<H>;    PTT: x                    MICROBIOLOGY:    Culture - Blood (collected 09 Feb 2018 21:50)  Source: BLOOD VENOUS  Preliminary Report (11 Feb 2018 21:49):    NO ORGANISMS ISOLATED    NO ORGANISMS ISOLATED AT 48 HRS.    Culture - Blood (collected 09 Feb 2018 21:50)  Source: BLOOD PERIPHERAL  Preliminary Report (11 Feb 2018 21:49):    NO ORGANISMS ISOLATED    NO ORGANISMS ISOLATED AT 48 HRS.          RADIOLOGY & ADDITIONAL TESTS:    Imaging Personally Reviewed:    [x ] Consultant(s) Notes Reviewed: pulmonary  [ ] Care Discussed with Consultants/Other Providers:

## 2018-02-12 NOTE — PROGRESS NOTE ADULT - ASSESSMENT
72M with PMH of ESRD on HD (MWF- full session) via LUE AVF, afib, mechanical mitral valve repair (on warfarin) p/w cough x 3 weeks admitted for possible pna c/b afib w/ RVR in ED.     *Multilobar pneumonia: suspect gram negative pneumonia, also possible staph aureus pneumonia given recent influenza. RVP positive for Flu  - clinically improved from pneumonia  - change antibiotics to Levaquin  - CT chest with bilateral effusions  - appreciate pulmonary input - doubt infected pleural space, monitor on antibiotics for now    *Supratherapeutic INR: on coumadin for mechanical MVR with a fib  - hgb decreased today  - no clear source of bleeding, however BM's should be monitored  - FOBT  - cardiology consult to aid in whether vitamin K should be given in the setting of persistently elevated INR with significant anemia, but also with h/o mechanical mitral valve    *Anemia: possible acute blood anemia in setting of elevated INR. Likely anemia 2/2 CKD.  - baseline Hgb unknown - obtain outpatient labs  - transfuse 1 unit PRBC's with HD today given hgb < 7  - CHRISTEN with HD per renal  - active T&S  - FOBT  - transfuse if hgb < 7    *Influenza: complete 5 day course of Tamiflu    *A fib with RVR: RVR likely 2/2 infection  - management of infection as above  - cont tele  - HR now controlled  - cont toprol XL 25 mg daily    *Mechanical MVR - also has A fib  - INR goal 2.5 - 3.5  - INR continues to be supratherapeutic, likely 2/2 poor PO intake  - cardiology consult re: management of AC in this setting  - monitor INR daily    *ESRD on HD:  - renal consult appreciated    *Cirrhosis: seen incidentally on CT chest  - bilirubin normal, albumin slightly decreased in setting of infection  - will corroborate history with patient/family    *HTN: continue home meds  - monitor BP closely    *Hyponatremia: likely in setting of ESRD  - worsening today  - to receive HD today  - monitor      *FEN/GI: renal diet    *Ppx: supratherapeutic INR      *Dispo: INR elevated - pending cardiology consult regarding recommendations for management. F/u hemoglobin after transfusion. Otherwise patient is clinically stable for d/c home  - f/u with cardiology, renal  - PT consult  - OOBTC

## 2018-02-12 NOTE — PROGRESS NOTE ADULT - ASSESSMENT
71 yo M. PMH of ESRD on HD (MWF- full session) via LUE AVF, afib, mechanical mitral valve repair (on warfarin) presents with cough x 3 weeks found to be flu positive with multifocal pneumonia and b/l pleural effusions.     1) Pleural effusion  - POCUS showed small left and small to moderate pleural effusions and they were simple appearing  - Effusion may be related to fluid overload in setting of ESRD or parapneumonic given the underlying pneumonia but the pt is non toxic appearing, afebrile which makes empyema low on the list of differential diagnosis.   - Given the presence of mechanical mitral valve and risks associated with INR reversal along with an INR > 7, will not pursue a diagnostic tap at this time unless clinical signs and symptoms worsen.  - Antibiotics for superimposed bacterial infection along with influenza (Cefepime + Doxycycline) 71 yo M. PMH of ESRD on HD (MWF- full session) via LUE AVF, afib, mechanical mitral valve repair (on warfarin) presents with cough x 3 weeks found to be flu positive with multifocal pneumonia and b/l pleural effusions.     1) Pleural effusion  - POCUS showed small left and small to moderate pleural effusions and they were simple appearing  - Effusion may be related to fluid overload in setting of ESRD or parapneumonic given the underlying pneumonia but the pt is non toxic appearing, afebrile which makes empyema low on the list of differential diagnosis.   - Given the presence of mechanical mitral valve and risks associated with INR reversal along with an INR > 7, will not pursue a diagnostic tap at this time unless clinical signs and symptoms worsen/change.   - Antibiotics for superimposed bacterial infection along with influenza (Cefepime + Doxycycline)

## 2018-02-13 LAB
BUN SERPL-MCNC: 32 MG/DL — HIGH (ref 7–23)
CALCIUM SERPL-MCNC: 8.3 MG/DL — LOW (ref 8.4–10.5)
CHLORIDE SERPL-SCNC: 93 MMOL/L — LOW (ref 98–107)
CO2 SERPL-SCNC: 24 MMOL/L — SIGNIFICANT CHANGE UP (ref 22–31)
CREAT SERPL-MCNC: 6.92 MG/DL — HIGH (ref 0.5–1.3)
GLUCOSE SERPL-MCNC: 96 MG/DL — SIGNIFICANT CHANGE UP (ref 70–99)
HCT VFR BLD CALC: 23.6 % — LOW (ref 39–50)
HGB BLD-MCNC: 7.8 G/DL — LOW (ref 13–17)
INR BLD: 6.54 — CRITICAL HIGH (ref 0.88–1.17)
MAGNESIUM SERPL-MCNC: 2.3 MG/DL — SIGNIFICANT CHANGE UP (ref 1.6–2.6)
MCHC RBC-ENTMCNC: 31.6 PG — SIGNIFICANT CHANGE UP (ref 27–34)
MCHC RBC-ENTMCNC: 33.1 % — SIGNIFICANT CHANGE UP (ref 32–36)
MCV RBC AUTO: 95.5 FL — SIGNIFICANT CHANGE UP (ref 80–100)
NRBC # FLD: 0 — SIGNIFICANT CHANGE UP
PLATELET # BLD AUTO: 161 K/UL — SIGNIFICANT CHANGE UP (ref 150–400)
PMV BLD: 10.2 FL — SIGNIFICANT CHANGE UP (ref 7–13)
POTASSIUM SERPL-MCNC: 3.9 MMOL/L — SIGNIFICANT CHANGE UP (ref 3.5–5.3)
POTASSIUM SERPL-SCNC: 3.9 MMOL/L — SIGNIFICANT CHANGE UP (ref 3.5–5.3)
PROTHROM AB SERPL-ACNC: 75.4 SEC — HIGH (ref 9.8–13.1)
RBC # BLD: 2.47 M/UL — LOW (ref 4.2–5.8)
RBC # FLD: 15.9 % — HIGH (ref 10.3–14.5)
SODIUM SERPL-SCNC: 133 MMOL/L — LOW (ref 135–145)
WBC # BLD: 4.4 K/UL — SIGNIFICANT CHANGE UP (ref 3.8–10.5)
WBC # FLD AUTO: 4.4 K/UL — SIGNIFICANT CHANGE UP (ref 3.8–10.5)

## 2018-02-13 PROCEDURE — 99233 SBSQ HOSP IP/OBS HIGH 50: CPT

## 2018-02-13 PROCEDURE — 99223 1ST HOSP IP/OBS HIGH 75: CPT

## 2018-02-13 RX ADMIN — Medication 1 TABLET(S): at 06:35

## 2018-02-13 RX ADMIN — Medication 1 MILLIGRAM(S): at 11:06

## 2018-02-13 RX ADMIN — SEVELAMER CARBONATE 2400 MILLIGRAM(S): 2400 POWDER, FOR SUSPENSION ORAL at 17:01

## 2018-02-13 RX ADMIN — SEVELAMER CARBONATE 2400 MILLIGRAM(S): 2400 POWDER, FOR SUSPENSION ORAL at 08:05

## 2018-02-13 RX ADMIN — Medication 200 MILLIGRAM(S): at 16:36

## 2018-02-13 RX ADMIN — Medication 200 MILLIGRAM(S): at 22:41

## 2018-02-13 RX ADMIN — Medication 1 TABLET(S): at 17:01

## 2018-02-13 RX ADMIN — DONEPEZIL HYDROCHLORIDE 5 MILLIGRAM(S): 10 TABLET, FILM COATED ORAL at 22:41

## 2018-02-13 RX ADMIN — ATORVASTATIN CALCIUM 40 MILLIGRAM(S): 80 TABLET, FILM COATED ORAL at 22:41

## 2018-02-13 RX ADMIN — Medication 25 MILLIGRAM(S): at 06:34

## 2018-02-13 NOTE — PROGRESS NOTE ADULT - ASSESSMENT
72M with PMH of ESRD on HD (MWF- full session) via LUE AVF, afib, mechanical mitral valve repair (on warfarin) p/w cough x 3 weeks admitted for possible pna c/b afib w/ RVR in ED.     *Multilobar pneumonia: suspect gram negative pneumonia, also possible staph aureus pneumonia given recent influenza. RVP positive for Flu  - clinically improved from pneumonia  - changed antibiotics to Levaquin - plan for 10-14 day course  - CT chest with bilateral effusions  - appreciate pulmonary input - doubt infected pleural space, monitor on antibiotics for now    *Supratherapeutic INR: on coumadin for mechanical MVR with a fib  - hgb respondedly appropriately to transfusion  - monitor for evidence of bleeding  - monitor BM's  - FOBT  - cardiology consulted, appreciate recommendations  - allow INR to drift down - went down today    *Anemia: possible acute blood anemia in setting of elevated INR. Likely anemia 2/2 CKD.  - baseline Hgb unknown - obtain outpatient labs  - transfuse 1 unit PRBC's with HD today given hgb < 7  - CHRISTEN with HD per renal  - active T&S  - FOBT  - transfuse if hgb < 7    *Influenza: complete 5 day course of Tamiflu    *A fib with RVR: RVR likely 2/2 infection  - management of infection as above  - cont tele  - HR now controlled  - cont toprol XL 25 mg daily    *Mechanical MVR - also has A fib  - INR goal 2.5 - 3.5  - INR continues to be supratherapeutic, likely 2/2 poor PO intake  - cardiology consult re: management of AC in this setting  - monitor INR daily    *ESRD on HD:  - renal consult appreciated    *Cirrhosis: seen incidentally on CT chest  - bilirubin normal, albumin slightly decreased in setting of infection  - will corroborate history with patient/family    *HTN: continue home meds  - monitor BP closely    *Hyponatremia: likely in setting of ESRD  - worsening today  - to receive HD today  - monitor      *FEN/GI: renal diet    *Ppx: supratherapeutic INR      *Dispo: INR elevated - once level lower and okay with cardiology, will plan for d/c with oral antibiotics, and outpatient f/u with pulmonary, cardiology, and renal  - PT consult  - OOBTC

## 2018-02-13 NOTE — CONSULT NOTE ADULT - ASSESSMENT
Admitted with multifocal PNA, started on Levaquin   Mechanical (mitral?) valve with AFib on warfarin  Supratherapeutic INR without evidence of active bleeding (INR: 7.67)  Allow INR to drift down.  No need for reversal with Vit K.  Anemia noted, no active bleeding.  Caution that co-administration with abx such as Levaquin can increase INR.  Will follow.  D/W team Admitted with multifocal PNA, started on Levaquin   Mechanical (mitral?) valve with AFib on warfarin  Supratherapeutic INR without evidence of active bleeding (INR: 7.67)  Allow INR to drift down.  No need for reversal with Vit K.  Anemia noted, no active bleeding (h/o ESRD/HD).  Caution that co-administration with abx such as Levaquin can increase INR.  Will follow.  D/W team

## 2018-02-13 NOTE — PROGRESS NOTE ADULT - SUBJECTIVE AND OBJECTIVE BOX
CHIEF COMPLAINT: Patient is a 72y old  male who presents with a chief complaint of Cough (10 Feb 2018 03:15)      SUBJECTIVE / OVERNIGHT EVENTS:    No complaints. Reports he's feeling well.     MEDICATIONS  (STANDING):  atorvastatin 40 milliGRAM(s) Oral at bedtime  donepezil 5 milliGRAM(s) Oral at bedtime  folic acid 1 milliGRAM(s) Oral daily  lactobacillus acidophilus 1 Tablet(s) Oral every 12 hours  levoFLOXacin  Tablet 500 milliGRAM(s) Oral every 48 hours  metoprolol succinate ER 25 milliGRAM(s) Oral daily  sevelamer hydrochloride 2400 milliGRAM(s) Oral two times a day with meals    MEDICATIONS  (PRN):  guaiFENesin   Syrup  (Sugar-Free) 200 milliGRAM(s) Oral every 6 hours PRN Cough      VITALS:  T(F): 98.5 (02-13-18 @ 06:27), Max: 98.6 (02-12-18 @ 19:00)  HR: 84 (02-13-18 @ 06:27) (76 - 84)  BP: 156/76 (02-13-18 @ 06:27) (123/62 - 156/76)  RR: 15 (02-13-18 @ 06:27) (15 - 18)  SpO2: 95% (02-13-18 @ 06:27)      CAPILLARY BLOOD GLUCOSE    Output     I&O's Summary  T(F): 98.5 (02-13-18 @ 06:27), Max: 98.6 (02-12-18 @ 19:00)  HR: 84 (02-13-18 @ 06:27) (76 - 84)  BP: 156/76 (02-13-18 @ 06:27) (123/62 - 156/76)  RR: 15 (02-13-18 @ 06:27) (15 - 18)  SpO2: 95% (02-13-18 @ 06:27)    PHYSICAL EXAM:  GENERAL: NAD, well-developed  HEAD:  Atraumatic, Normocephalic  EYES: EOMI  NECK: Supple, No JVD  CHEST/LUNG: nonlabored breathing  HEART: nl S1/S2  ABDOMEN: nondistended, soft  EXTREMITIES:  no LE edema  PSYCH: A&Ox3  NEUROLOGY: non-focal  SKIN: No rashes noted    LABS:              7.8                  133  | 24   | 32           4.40  >-----------< 161     ------------------------< 96                    23.6                 3.9  | 93   | 6.92                                         Ca 8.3   Mg 2.3   Ph x            INR: 6.54<HH>;    PT: 75.4 SEC<H>;    PTT: x                    MICROBIOLOGY:        RADIOLOGY & ADDITIONAL TESTS:    Imaging Personally Reviewed:        < from: CT Chest No Cont (02.10.18 @ 11:09) >    Multifocal pneumonia.    Bilateral pleural effusions.    Cirrhosis.    < end of copied text >  [ ] Consultant(s) Notes Reviewed:  [ ] Care Discussed with Consultants/Other Providers:

## 2018-02-13 NOTE — CONSULT NOTE ADULT - SUBJECTIVE AND OBJECTIVE BOX
Cardiology/Vascular Medicine Inpatient Consultation Note      HISTORY OF PRESENT ILLNESS:  Patient is a 72M with history of ESRD on HD (MWF- full session) via LUE AVF, AF, mechanical mitral valve repair on warfarin p/w cough x 3 weeks.   Reports having a recent hospitalization last month for a bleeding fistula at OSH.  Patient has had a productive cough x3 weeks, worsening dyspnea which prompted presentation and admission to ED.  CT chest: multifocal PNA, bilateral pleural effusions, cirrhosis, currently on Levaquin    In ED, Tmax 101, HR , /72, RR 18, SpO2 99 on RA Gave 1mg coumadin, Linezolid, Vanc and Zosyn, tylenol, 15mg IVP diltiazem, 25mg benadryl   Had episode of AF w/ RVR in ED , improved s/p diltiazem (10 Feb 2018 03:15).    INR then noted to be supratherapeutic.  Currently INR: 7.67,        Noted to be anemic, but without obvious current blood loss.       Allergies  No Known Allergies      MEDICATIONS:  metoprolol succinate ER 25 milliGRAM(s) Oral daily    levoFLOXacin  Tablet 500 milliGRAM(s) Oral every 48 hours    guaiFENesin   Syrup  (Sugar-Free) 200 milliGRAM(s) Oral every 6 hours PRN    donepezil 5 milliGRAM(s) Oral at bedtime      atorvastatin 40 milliGRAM(s) Oral at bedtime    folic acid 1 milliGRAM(s) Oral daily      PAST MEDICAL & SURGICAL HISTORY:  Hypertension  ESRD (end stage renal disease) on dialysis  Atrial fibrillation  Mitral valve replaced: mechanical valve  A-V fistula    FAMILY HISTORY:  No pertinent family history in first degree relatives    SOCIAL HISTORY:    [place]    REVIEW OF SYSTEMS:  CONSTITUTIONAL: No fever, weight loss, or fatigue  EYES: No eye pain, visual disturbances, or discharge  ENMT:  No difficulty hearing, tinnitus, vertigo; No sinus or throat pain  NECK: No pain or stiffness  RESPIRATORY: As above  CARDIOVASCULAR: As above  GASTROINTESTINAL: No abdominal or epigastric pain. No nausea, vomiting, or hematemesis; No diarrhea or constipation. No melena or hematochezia.  GENITOURINARY: No dysuria, frequency, hematuria, or incontinence  NEUROLOGICAL: No headaches, memory loss, loss of strength, numbness, or tremors  SKIN: As below  LYMPH Nodes: No enlarged glands  ENDOCRINE: No heat or cold intolerance; No hair loss  MUSCULOSKELETAL: No joint pain or swelling; No muscle, back, or extremity pain  PSYCHIATRIC: No depression, anxiety, mood swings, or difficulty sleeping  HEME/LYMPH: As above    PHYSICAL EXAM:  T(C): 36.9 (02-13-18 @ 06:27), Max: 37 (02-12-18 @ 19:00)  HR: 84 (02-13-18 @ 06:27) (76 - 84)  BP: 156/76 (02-13-18 @ 06:27) (123/62 - 156/76)  RR: 15 (02-13-18 @ 06:27) (15 - 18)  SpO2: 95% (02-13-18 @ 06:27) (95% - 98%)  Wt(kg): --  I&O's Summary    12 Feb 2018 07:01  -  13 Feb 2018 07:00  --------------------------------------------------------  IN: 820 mL / OUT: 3000 mL / NET: -2180 mL    13 Feb 2018 07:01  -  13 Feb 2018 10:08  --------------------------------------------------------  IN: 120 mL / OUT: 0 mL / NET: 120 mL        Appearance: Elderly man, NAD  HEENT:   Normal oral mucosa, PERRL, EOMI	  Lymphatic: No lymphadenopathy  Cardiovascular: Irreg irreg S1 S2, No JVD, +click  Respiratory: Decreased BS b/l  Psychiatry: awake, able to answer questions  Gastrointestinal:  Soft, Non-tender, + BS	  Skin: +ecchymoses  Neurologic: Non-focal  Extremities: Normal range of motion, No clubbing, cyanosis or edema  Vascular: Peripheral pulses palpable 2+ bilaterally      LABS:	 	                          7.8    4.40  )-----------( 161      ( 13 Feb 2018 07:40 )             23.6     02-13    133<L>  |  93<L>  |  32<H>  ----------------------------<  96  3.9   |  24  |  6.92<H>  02-12    129<L>  |  90<L>  |  57<H>  ----------------------------<  96  4.5   |  21<L>  |  10.29<H>    Ca    8.3<L>      13 Feb 2018 07:40  Ca    8.4      12 Feb 2018 06:50  Mg     2.3     02-13  Mg     2.5     02-12    PT/INR - ( 12 Feb 2018 06:50 )   PT: 88.7 SEC;   INR: 7.67            < from: CT Chest No Cont (02.10.18 @ 11:09) >    EXAM:  CT CHEST        PROCEDURE DATE:  Feb 10 2018         INTERPRETATION:  CLINICAL INFORMATION: Cough and fever    COMPARISON: CT abdomen from 7/1/2014    PROCEDURE:   CT of the Chest was performed without intravenous contrast.  Sagittal and coronal reformats were performed.      FINDINGS:    CHEST:     LUNGS AND LARGE AIRWAYS: Patent central airways.  Patchy airspace   consolidations most prominent in the right upper lobe and bilateral lower   lobes and to a lesser extent within the posterior left upper lobe   consistent with multifocal pneumonia.  PLEURA: Small to moderate right and small left pleural effusions.  VESSELS: Atherosclerotic changes of the aorta and coronary arteries.  HEART: Cardiomegaly with marked biatrial enlargement.CABG. Mitral valve   replacement.  No pericardial effusion.   MEDIASTINUM AND CHELE: No lymphadenopathy.  CHEST WALL AND LOWER NECK: Sternotomy.  VISUALIZED UPPER ABDOMEN: Cirrhosis. A few scattered hepatic cysts.   Atrophic kidneys.  BONES: Spinal degenerative changes.    IMPRESSION:     Multifocal pneumonia.    Bilateral pleural effusions.    Cirrhosis.      MORGAN LIU M.D., RADIOLOGY RESIDENT  This document has been electronically signed.  CHARITO RODRIGUEZ M.D., ATTENDING RADIOLOGIST  This document has been electronically signed. Feb 10 2018  1:11PM

## 2018-02-14 LAB
BACTERIA BLD CULT: SIGNIFICANT CHANGE UP
BACTERIA BLD CULT: SIGNIFICANT CHANGE UP
BUN SERPL-MCNC: 46 MG/DL — HIGH (ref 7–23)
CALCIUM SERPL-MCNC: 8.5 MG/DL — SIGNIFICANT CHANGE UP (ref 8.4–10.5)
CHLORIDE SERPL-SCNC: 91 MMOL/L — LOW (ref 98–107)
CO2 SERPL-SCNC: 22 MMOL/L — SIGNIFICANT CHANGE UP (ref 22–31)
CREAT SERPL-MCNC: 9.13 MG/DL — HIGH (ref 0.5–1.3)
GLUCOSE SERPL-MCNC: 93 MG/DL — SIGNIFICANT CHANGE UP (ref 70–99)
HCT VFR BLD CALC: 23.1 % — LOW (ref 39–50)
HGB BLD-MCNC: 7.6 G/DL — LOW (ref 13–17)
INR BLD: 7.12 — CRITICAL HIGH (ref 0.88–1.17)
MAGNESIUM SERPL-MCNC: 2.3 MG/DL — SIGNIFICANT CHANGE UP (ref 1.6–2.6)
MCHC RBC-ENTMCNC: 31.1 PG — SIGNIFICANT CHANGE UP (ref 27–34)
MCHC RBC-ENTMCNC: 32.9 % — SIGNIFICANT CHANGE UP (ref 32–36)
MCV RBC AUTO: 94.7 FL — SIGNIFICANT CHANGE UP (ref 80–100)
NRBC # FLD: 0 — SIGNIFICANT CHANGE UP
PLATELET # BLD AUTO: 168 K/UL — SIGNIFICANT CHANGE UP (ref 150–400)
PMV BLD: 10.3 FL — SIGNIFICANT CHANGE UP (ref 7–13)
POTASSIUM SERPL-MCNC: 4.4 MMOL/L — SIGNIFICANT CHANGE UP (ref 3.5–5.3)
POTASSIUM SERPL-SCNC: 4.4 MMOL/L — SIGNIFICANT CHANGE UP (ref 3.5–5.3)
PROTHROM AB SERPL-ACNC: 82.3 SEC — HIGH (ref 9.8–13.1)
RBC # BLD: 2.44 M/UL — LOW (ref 4.2–5.8)
RBC # FLD: 15.4 % — HIGH (ref 10.3–14.5)
SODIUM SERPL-SCNC: 132 MMOL/L — LOW (ref 135–145)
WBC # BLD: 4.66 K/UL — SIGNIFICANT CHANGE UP (ref 3.8–10.5)
WBC # FLD AUTO: 4.66 K/UL — SIGNIFICANT CHANGE UP (ref 3.8–10.5)

## 2018-02-14 PROCEDURE — 99233 SBSQ HOSP IP/OBS HIGH 50: CPT

## 2018-02-14 PROCEDURE — 99233 SBSQ HOSP IP/OBS HIGH 50: CPT | Mod: GC

## 2018-02-14 PROCEDURE — 99232 SBSQ HOSP IP/OBS MODERATE 35: CPT | Mod: GC

## 2018-02-14 PROCEDURE — 99232 SBSQ HOSP IP/OBS MODERATE 35: CPT

## 2018-02-14 RX ORDER — ERYTHROPOIETIN 10000 [IU]/ML
4000 INJECTION, SOLUTION INTRAVENOUS; SUBCUTANEOUS
Qty: 0 | Refills: 0 | Status: DISCONTINUED | OUTPATIENT
Start: 2018-02-14 | End: 2018-02-18

## 2018-02-14 RX ADMIN — Medication 25 MILLIGRAM(S): at 06:11

## 2018-02-14 RX ADMIN — ERYTHROPOIETIN 4000 UNIT(S): 10000 INJECTION, SOLUTION INTRAVENOUS; SUBCUTANEOUS at 14:03

## 2018-02-14 RX ADMIN — DONEPEZIL HYDROCHLORIDE 5 MILLIGRAM(S): 10 TABLET, FILM COATED ORAL at 21:52

## 2018-02-14 RX ADMIN — Medication 1 TABLET(S): at 16:48

## 2018-02-14 RX ADMIN — Medication 200 MILLIGRAM(S): at 21:52

## 2018-02-14 RX ADMIN — ATORVASTATIN CALCIUM 40 MILLIGRAM(S): 80 TABLET, FILM COATED ORAL at 21:51

## 2018-02-14 RX ADMIN — SEVELAMER CARBONATE 2400 MILLIGRAM(S): 2400 POWDER, FOR SUSPENSION ORAL at 11:28

## 2018-02-14 RX ADMIN — SEVELAMER CARBONATE 2400 MILLIGRAM(S): 2400 POWDER, FOR SUSPENSION ORAL at 16:48

## 2018-02-14 RX ADMIN — Medication 1 MILLIGRAM(S): at 16:48

## 2018-02-14 RX ADMIN — Medication 1 TABLET(S): at 06:11

## 2018-02-14 NOTE — PROGRESS NOTE ADULT - ATTENDING COMMENTS
I have seen and examined the patient. I agree with the above history, physical exam, and plan of care except for as detailed below.    73 y/o M w/ESRD p/w bilateral PNA found to have influenza. Noted to have bilateral pleural effusions on imaging. Patient remains clinically well without evidence of infection. Pleural effusions are stable to improved. Effusions likely secondary to volume overload, doubt infected pleural space. No plan for thora at this time, but will consider thoracentesis if patient deteriorates, develops elevated WBC count or fever, and supratherapeutic INR improves.

## 2018-02-14 NOTE — PROGRESS NOTE ADULT - SUBJECTIVE AND OBJECTIVE BOX
Cardiology/Vascular Medicine Inpatient Progress Note    No new events/complaints.  Telemetry: Afib 80s bpm  INR 7.12 today    Vital Signs Last 24 Hrs  T(C): 37 (14 Feb 2018 06:08), Max: 37.3 (13 Feb 2018 19:41)  T(F): 98.6 (14 Feb 2018 06:08), Max: 99.2 (13 Feb 2018 19:41)  HR: 78 (14 Feb 2018 06:08) (72 - 90)  BP: 155/70 (14 Feb 2018 06:08) (151/60 - 160/86)  BP(mean): --  RR: 13 (14 Feb 2018 06:08) (13 - 18)  SpO2: 96% (14 Feb 2018 06:08) (93% - 98%)    I&O's Summary    13 Feb 2018 07:01  -  14 Feb 2018 07:00  --------------------------------------------------------  IN: 240 mL / OUT: 0 mL / NET: 240 mL      Appearance: Elderly man, NAD  HEENT:   Normal oral mucosa, PERRL, EOMI	  Lymphatic: No lymphadenopathy  Cardiovascular: Irreg irreg S1 S2, No JVD, +click  Respiratory: Decreased BS b/l  Psychiatry: awake, able to answer questions  Gastrointestinal:  Soft, Non-tender, + BS	  Skin: +ecchymoses  Neurologic: Non-focal  Extremities: Normal range of motion, No clubbing, cyanosis or edema  Vascular: Peripheral pulses palpable 2+ bilaterally    MEDICATIONS  (STANDING):  atorvastatin 40 milliGRAM(s) Oral at bedtime  donepezil 5 milliGRAM(s) Oral at bedtime  epoetin angelica Injectable 4000 Unit(s) IV Push <User Schedule>  folic acid 1 milliGRAM(s) Oral daily  lactobacillus acidophilus 1 Tablet(s) Oral every 12 hours  levoFLOXacin  Tablet 500 milliGRAM(s) Oral every 48 hours  metoprolol succinate ER 25 milliGRAM(s) Oral daily  sevelamer hydrochloride 2400 milliGRAM(s) Oral two times a day with meals    MEDICATIONS  (PRN):  guaiFENesin   Syrup  (Sugar-Free) 200 milliGRAM(s) Oral every 6 hours PRN Cough      LABS:	 	                                7.6    4.66  )-----------( 168      ( 14 Feb 2018 07:31 )             23.1   02-13    133<L>  |  93<L>  |  32<H>  ----------------------------<  96  3.9   |  24  |  6.92<H>    Ca    8.3<L>      13 Feb 2018 07:40  Mg     2.3     02-13    PT/INR - ( 14 Feb 2018 07:31 )   PT: 82.3 SEC;   INR: 7.12                 < from: CT Chest No Cont (02.10.18 @ 11:09) >    EXAM:  CT CHEST        PROCEDURE DATE:  Feb 10 2018         INTERPRETATION:  CLINICAL INFORMATION: Cough and fever    COMPARISON: CT abdomen from 7/1/2014    PROCEDURE:   CT of the Chest was performed without intravenous contrast.  Sagittal and coronal reformats were performed.      FINDINGS:    CHEST:     LUNGS AND LARGE AIRWAYS: Patent central airways.  Patchy airspace   consolidations most prominent in the right upper lobe and bilateral lower   lobes and to a lesser extent within the posterior left upper lobe   consistent with multifocal pneumonia.  PLEURA: Small to moderate right and small left pleural effusions.  VESSELS: Atherosclerotic changes of the aorta and coronary arteries.  HEART: Cardiomegaly with marked biatrial enlargement.CABG. Mitral valve   replacement.  No pericardial effusion.   MEDIASTINUM AND CHELE: No lymphadenopathy.  CHEST WALL AND LOWER NECK: Sternotomy.  VISUALIZED UPPER ABDOMEN: Cirrhosis. A few scattered hepatic cysts.   Atrophic kidneys.  BONES: Spinal degenerative changes.    IMPRESSION:     Multifocal pneumonia.    Bilateral pleural effusions.    Cirrhosis.      MORGAN LIU M.D., RADIOLOGY RESIDENT  This document has been electronically signed.  CHARITO RODRIGUEZ M.D., ATTENDING RADIOLOGIST  This document has been electronically signed. Feb 10 2018  1:11PM

## 2018-02-14 NOTE — PROGRESS NOTE ADULT - ASSESSMENT
72M with PMH of ESRD on HD (MWF- full session) via LUE AVF, afib, mechanical mitral valve repair (on warfarin) p/w cough x 3 weeks admitted for possible pna c/b afib w/ RVR in ED.     *Multilobar pneumonia: suspect gram negative pneumonia, also possible staph aureus pneumonia given recent influenza. RVP positive for Flu  - clinically improved from pneumonia  - changed antibiotics to Levaquin - plan for 10-14 day course  - CT chest with bilateral effusions  - appreciate pulmonary input - doubt infected pleural space, monitor on antibiotics for now    *Supratherapeutic INR: on coumadin for mechanical MVR with a fib  - hgb respondedly appropriately to transfusion  - monitor for evidence of bleeding  - monitor BM's  - FOBT  - cardiology consulted, appreciate recommendations  - allow INR to drift down  - will discuss with cardiology at what INR would be safe to discharge patient home    *Anemia: possible acute blood anemia in setting of elevated INR. Likely anemia 2/2 CKD.  - baseline Hgb unknown - obtain outpatient labs  - transfuse 1 unit PRBC's with HD today given hgb < 7  - CHRISTEN with HD per renal  - active T&S  - FOBT  - transfuse if hgb < 7    *Influenza: complete 5 day course of Tamiflu    *A fib with RVR: RVR likely 2/2 infection  - management of infection as above  - cont tele  - HR now controlled  - cont toprol XL 25 mg daily    *Mechanical MVR - also has A fib  - INR goal 2.5 - 3.5  - INR continues to be supratherapeutic, likely 2/2 poor PO intake  - cardiology consult re: management of AC in this setting  - monitor INR daily    *ESRD on HD:  - renal consult appreciated    *Cirrhosis: seen incidentally on CT chest  - bilirubin normal, albumin slightly decreased in setting of infection  - will corroborate history with patient/family    *HTN: continue home meds  - monitor BP closely    *Hyponatremia: likely in setting of ESRD  - worsening today  - to receive HD today  - monitor      *FEN/GI: renal diet    *Ppx: supratherapeutic INR      *Dispo: INR elevated - once level lower and okay with cardiology, will plan for d/c with oral antibiotics, and outpatient f/u with pulmonary, cardiology, and renal  - PT consult  - OOBTC        >30 min spent in coordinating d/c of this patient

## 2018-02-14 NOTE — PROGRESS NOTE ADULT - SUBJECTIVE AND OBJECTIVE BOX
[facility] DIVISION OF KIDNEY DISEASES AND HYPERTENSION -- FOLLOW UP NOTE  --------------------------------------------------------------------------------  HPI: This is a 73 y/o M with a PMHx of HTN, MVR ESRD on HD MWF admitted for influenza PNA on IV ABX. Pt. gets HD at [facility] and Dr. Goldberg is his nephrologist. Pt. last got dialysis on 2/12, tolerated well without complications. Currently pt. resting in bed. Pt. states he has some SOB denies any CP, N/V.       PAST HISTORY  --------------------------------------------------------------------------------  No significant changes to PMH, PSH, FHx, SHx, unless otherwise noted    ALLERGIES & MEDICATIONS  --------------------------------------------------------------------------------  Allergies    No Known Allergies    Intolerances      Standing Inpatient Medications  atorvastatin 40 milliGRAM(s) Oral at bedtime  donepezil 5 milliGRAM(s) Oral at bedtime  folic acid 1 milliGRAM(s) Oral daily  lactobacillus acidophilus 1 Tablet(s) Oral every 12 hours  levoFLOXacin  Tablet 500 milliGRAM(s) Oral every 48 hours  metoprolol succinate ER 25 milliGRAM(s) Oral daily  sevelamer hydrochloride 2400 milliGRAM(s) Oral two times a day with meals    PRN Inpatient Medications  guaiFENesin   Syrup  (Sugar-Free) 200 milliGRAM(s) Oral every 6 hours PRN      REVIEW OF SYSTEMS  --------------------------------------------------------------------------------  Gen: + weakness  Skin: No rashes  Head/Eyes/Ears/Mouth: No headache;  Respiratory: + mild SOB   CV: No chest pain,   GI: No abdominal pain,   : No dysuria,  MSK: No edema      All other systems were reviewed and are negative, except as noted.    VITALS/PHYSICAL EXAM  --------------------------------------------------------------------------------  T(C): 37 (02-14-18 @ 06:08), Max: 37.3 (02-13-18 @ 19:41)  HR: 78 (02-14-18 @ 06:08) (72 - 90)  BP: 155/70 (02-14-18 @ 06:08) (151/60 - 160/86)  RR: 13 (02-14-18 @ 06:08) (13 - 18)  SpO2: 96% (02-14-18 @ 06:08) (93% - 98%)  Wt(kg): --        02-13-18 @ 07:01  -  02-14-18 @ 07:00  --------------------------------------------------------  IN: 240 mL / OUT: 0 mL / NET: 240 mL      Physical Exam:  	Gen: NAD,   	HEENT: PERRL,   	Pulm: mild bibasilar crackles   	CV: RRR,   	Abd:  soft  	: No suprapubic tenderness  	LE: Warm,  no edema  	Neuro: awake and alert   	Psych: Normal affect and mood  	Skin: Warm, without rashes  	Vascular access: KRISTINE ALVAREZF +ginger/[place]   LABS/STUDIES  --------------------------------------------------------------------------------              7.8    4.40  >-----------<  161      [02-13-18 @ 07:40]              23.6     133  |  93  |  32  ----------------------------<  96      [02-13-18 @ 07:40]  3.9   |  24  |  6.92        Ca     8.3     [02-13-18 @ 07:40]      Mg     2.3     [02-13-18 @ 07:40]      PT/INR: PT 75.4 , INR 6.54       [02-13-18 @ 10:55]      Creatinine Trend:  SCr 6.92 [02-13 @ 07:40]  SCr 10.29 [02-12 @ 06:50]  SCr 8.00 [02-11 @ 06:10]  SCr 6.71 [02-10 @ 14:22]  SCr 5.01 [02-09 @ 21:22]        Iron 30, TIBC 147, %sat --      [02-12-18 @ 06:50]  Ferritin 1670      [02-11-18 @ 06:10]  TSH 4.49      [02-10-18 @ 14:22]    HBsAg PRELIM POSITIVE REPEATED      [02-12-18 @ 10:33] [facility] DIVISION OF KIDNEY DISEASES AND HYPERTENSION -- FOLLOW UP NOTE  --------------------------------------------------------------------------------  HPI: This is a 73 y/o M with a PMHx of HTN, MVR ESRD on HD MWF admitted for influenza PNA on IV ABX. Pt. gets HD at [facility] and Dr. Goldberg is his nephrologist. Pt. last got dialysis on 2/12, tolerated well without complications. Currently pt. resting in bed. Pt. states he has some SOB denies any CP, N/V.       PAST HISTORY  --------------------------------------------------------------------------------  No significant changes to PMH, PSH, FHx, SHx, unless otherwise noted    ALLERGIES & MEDICATIONS  --------------------------------------------------------------------------------  Allergies    No Known Allergies    Intolerances      Standing Inpatient Medications  atorvastatin 40 milliGRAM(s) Oral at bedtime  donepezil 5 milliGRAM(s) Oral at bedtime  folic acid 1 milliGRAM(s) Oral daily  lactobacillus acidophilus 1 Tablet(s) Oral every 12 hours  levoFLOXacin  Tablet 500 milliGRAM(s) Oral every 48 hours  metoprolol succinate ER 25 milliGRAM(s) Oral daily  sevelamer hydrochloride 2400 milliGRAM(s) Oral two times a day with meals    PRN Inpatient Medications  guaiFENesin   Syrup  (Sugar-Free) 200 milliGRAM(s) Oral every 6 hours PRN      REVIEW OF SYSTEMS  --------------------------------------------------------------------------------  Gen: + weakness  Respiratory: + mild SOB   CV: No chest pain,   GI: No abdominal pain,   : No dysuria,  MSK: No edema      All other systems were reviewed and are negative, except as noted.    VITALS/PHYSICAL EXAM  --------------------------------------------------------------------------------  T(C): 37 (02-14-18 @ 06:08), Max: 37.3 (02-13-18 @ 19:41)  HR: 78 (02-14-18 @ 06:08) (72 - 90)  BP: 155/70 (02-14-18 @ 06:08) (151/60 - 160/86)  RR: 13 (02-14-18 @ 06:08) (13 - 18)  SpO2: 96% (02-14-18 @ 06:08) (93% - 98%)  Wt(kg): --        02-13-18 @ 07:01  -  02-14-18 @ 07:00  --------------------------------------------------------  IN: 240 mL / OUT: 0 mL / NET: 240 mL      Physical Exam:  	Gen: NAD,   	HEENT: PERRL,   	Pulm: mild bibasilar crackles   	CV: RRR,   	Abd:  soft  	: No suprapubic tenderness  	LE: Warm,  no edema  	Neuro: awake and alert   	Psych: Normal affect and mood  	Skin: Warm, without rashes  	Vascular access: LUE AVF +thrill/bruit   LABS/STUDIES  --------------------------------------------------------------------------------              7.8    4.40  >-----------<  161      [02-13-18 @ 07:40]              23.6     133  |  93  |  32  ----------------------------<  96      [02-13-18 @ 07:40]  3.9   |  24  |  6.92        Ca     8.3     [02-13-18 @ 07:40]      Mg     2.3     [02-13-18 @ 07:40]      PT/INR: PT 75.4 , INR 6.54       [02-13-18 @ 10:55]      Creatinine Trend:  SCr 6.92 [02-13 @ 07:40]  SCr 10.29 [02-12 @ 06:50]  SCr 8.00 [02-11 @ 06:10]  SCr 6.71 [02-10 @ 14:22]  SCr 5.01 [02-09 @ 21:22]        Iron 30, TIBC 147, %sat --      [02-12-18 @ 06:50]  Ferritin 1670      [02-11-18 @ 06:10]  TSH 4.49      [02-10-18 @ 14:22]    HBsAg PRELIM POSITIVE REPEATED      [02-12-18 @ 10:33]

## 2018-02-14 NOTE — PROGRESS NOTE ADULT - ASSESSMENT
Admitted with multifocal PNA, started on Levaquin   Mechanical (mitral?) valve with AFib on warfarin  Supratherapeutic INR without evidence of active bleeding (INR: 7.67 --> 7.12 today)  Allow INR to drift down.  No need for reversal with Vit K.  Anemia noted, no active bleeding (h/o ESRD/HD).  Caution that co-administration with abx such as Levaquin can increase INR.  Will follow.  D/W team

## 2018-02-14 NOTE — PROGRESS NOTE ADULT - SUBJECTIVE AND OBJECTIVE BOX
Interval Events: No acute events noted overnight. Patient appears comfortable. Still with cough.     REVIEW OF SYSTEMS:  Constitutional: [ -] fevers [- ] chills [ ] weight loss [ ] weight gain  HEENT: [ ] dry eyes [ ] eye irritation [ ] postnasal drip [ ] nasal congestion  CV: [- ] chest pain [ -] orthopnea [ -] palpitations [ ] murmur  Resp: [+ ] cough [ -] shortness of breath [ -] dyspnea [ ] wheezing [- ] sputum [ ] hemoptysis  GI: [ -] nausea [- ] vomiting [ ] diarrhea [ ] constipation [ ] abd pain [ ] dysphagia   [x ] All other systems negative  [ ] Unable to assess ROS because ________    OBJECTIVE:  ICU Vital Signs Last 24 Hrs  T(C): 36.8 (12 Feb 2018 04:52), Max: 37.1 (11 Feb 2018 14:47)  T(F): 98.3 (12 Feb 2018 04:52), Max: 98.8 (11 Feb 2018 14:47)  HR: 84 (12 Feb 2018 04:52) (84 - 100)  BP: 150/71 (12 Feb 2018 04:52) (136/73 - 155/68)  RR: 16 (12 Feb 2018 04:52) (16 - 18)  SpO2: 98% (11 Feb 2018 21:37) (95% - 98%)      PHYSICAL EXAM:  General: NAD, non-toxic appearing  HEENT: MMM  Neck: supple  Respiratory: diminished BS b/l bases, inspiratory rales b/l, no wheezing  Cardiovascular: s1 s1 RRR  Abdomen: soft, non tender  Extremities: warm, no clubbing or cyanosis, no edema  Neurological: non focal  Psychiatry: AAOX3    HOSPITAL MEDICATIONS:  MEDICATIONS  (STANDING):  atorvastatin 40 milliGRAM(s) Oral at bedtime  cefepime  IVPB 1000 milliGRAM(s) IV Intermittent every 24 hours  donepezil 5 milliGRAM(s) Oral at bedtime  doxycycline IVPB      doxycycline IVPB 100 milliGRAM(s) IV Intermittent every 12 hours  folic acid 1 milliGRAM(s) Oral daily  lactobacillus acidophilus 1 Tablet(s) Oral every 12 hours  metoprolol succinate ER 25 milliGRAM(s) Oral daily  oseltamivir 30 milliGRAM(s) Oral <User Schedule>  sevelamer hydrochloride 2400 milliGRAM(s) Oral two times a day with meals    MEDICATIONS  (PRN):  guaiFENesin   Syrup  (Sugar-Free) 200 milliGRAM(s) Oral every 6 hours PRN Cough      LABS:                        7.5    6.37  )-----------( 169      ( 11 Feb 2018 06:10 )             23.2     Hgb Trend: 7.5<--, 7.0<--, 7.5<--, 7.2<--  02-11    133<L>  |  93<L>  |  43<H>  ----------------------------<  90  4.2   |  24  |  8.00<H>    Ca    8.5      11 Feb 2018 06:10  Phos  3.8     02-10  Mg     2.4     02-11    TPro  7.3  /  Alb  3.0<L>  /  TBili  0.9  /  DBili  x   /  AST  26  /  ALT  9   /  AlkPhos  56  02-09    Creatinine Trend: 8.00<--, 6.71<--, 5.01<--  PT/INR - ( 11 Feb 2018 06:10 )   PT: 85.3 SEC;   INR: 7.38          PTT - ( 09 Feb 2018 21:47 )  PTT:60.9 SEC      Venous Blood Gas:  02-09 @ 21:00  7.46/43/45/30/78.0  VBG Lactate: 1.4      MICROBIOLOGY:       RADIOLOGY:  [ x] Reviewed and interpreted by me    Bilateral multifocal air space opacities consistent with multifocal pneumonia. Bilateral PLEF, Right (small-moderate), left (small)

## 2018-02-14 NOTE — PROGRESS NOTE ADULT - ASSESSMENT
73 yo M. PMH of ESRD on HD (MWF- full session) via LUE AVF, afib, mechanical mitral valve repair (on warfarin) presents with cough x 3 weeks found to be flu positive with multifocal pneumonia and b/l pleural effusions.     1) Pleural effusion  - POCUS showed small left and small to moderate pleural effusions and they were simple appearing  - Effusion may be related to fluid overload in setting of ESRD or parapneumonic given the underlying pneumonia but the pt is non toxic appearing, afebrile which makes empyema low on the list of differential diagnosis.   - Given the presence of mechanical mitral valve and risks associated with INR reversal along with an INR > 7, will not pursue a diagnostic tap at this time unless clinical signs and symptoms worsen/change.   - Antibiotics for superimposed bacterial infection along with influenza (Cefepime + Doxycycline) 71 yo M. PMH of ESRD on HD (MWF- full session) via LUE AVF, afib, mechanical mitral valve repair (on warfarin) presents with cough x 3 weeks found to be flu positive with multifocal pneumonia and b/l pleural effusions.     1) Pleural effusion  - POCUS showed small left and small to moderate pleural effusions and they were simple appearing  - Effusion may be related to fluid overload in setting of ESRD or parapneumonic given the underlying pneumonia but the pt is non toxic appearing, afebrile which makes empyema low on the list of differential diagnosis.   - Given the presence of mechanical mitral valve and risks associated with INR reversal along with an INR > 7, will not pursue a diagnostic tap at this time unless clinical signs and symptoms worsen/change.   - Antibiotics for superimposed bacterial infection along with influenza noted.

## 2018-02-14 NOTE — PROGRESS NOTE ADULT - PROBLEM SELECTOR PLAN 1
ESRD on HD MWF. Pt. last had HD on 2/12. As per pt, tolerated well without complications. Pt. clinically stable. Will arrange for maintenance HD today. Monitor BP and labs.

## 2018-02-14 NOTE — PROGRESS NOTE ADULT - SUBJECTIVE AND OBJECTIVE BOX
CHIEF COMPLAINT: Patient is a 72y old  male who presents with a chief complaint of Cough (10 Feb 2018 03:15)      SUBJECTIVE / OVERNIGHT EVENTS:     No complaints. Feeling well.    MEDICATIONS  (STANDING):  atorvastatin 40 milliGRAM(s) Oral at bedtime  donepezil 5 milliGRAM(s) Oral at bedtime  epoetin angelica Injectable 4000 Unit(s) IV Push <User Schedule>  folic acid 1 milliGRAM(s) Oral daily  lactobacillus acidophilus 1 Tablet(s) Oral every 12 hours  levoFLOXacin  Tablet 500 milliGRAM(s) Oral every 48 hours  metoprolol succinate ER 25 milliGRAM(s) Oral daily  sevelamer hydrochloride 2400 milliGRAM(s) Oral two times a day with meals    MEDICATIONS  (PRN):  guaiFENesin   Syrup  (Sugar-Free) 200 milliGRAM(s) Oral every 6 hours PRN Cough      VITALS:  T(F): 98.6 (02-14-18 @ 06:08), Max: 99.2 (02-13-18 @ 19:41)  HR: 78 (02-14-18 @ 06:08) (72 - 90)  BP: 155/70 (02-14-18 @ 06:08) (151/60 - 160/86)  RR: 13 (02-14-18 @ 06:08) (13 - 18)  SpO2: 96% (02-14-18 @ 06:08)      CAPILLARY BLOOD GLUCOSE    Output     I&O's Summary  T(F): 98.6 (02-14-18 @ 06:08), Max: 99.2 (02-13-18 @ 19:41)  HR: 78 (02-14-18 @ 06:08) (72 - 90)  BP: 155/70 (02-14-18 @ 06:08) (151/60 - 160/86)  RR: 13 (02-14-18 @ 06:08) (13 - 18)  SpO2: 96% (02-14-18 @ 06:08)    PHYSICAL EXAM:  GENERAL: NAD, well-developed  HEAD:  Atraumatic, Normocephalic  EYES: EOMI  NECK: Supple, No JVD  CHEST/LUNG: crackles bibasilar, improved from prior  HEART: nl S1/S2  ABDOMEN: nondistended, soft  EXTREMITIES:  no LE edema  NEUROLOGY: hard of hearing  SKIN: No rashes noted    LABS:              7.6                  132  | 22   | 46           4.66  >-----------< 168     ------------------------< 93                    23.1                 4.4  | 91   | 9.13                                         Ca 8.5   Mg 2.3   Ph x            INR: 7.12<HH>;    PT: 82.3 SEC<H>;    PTT: x                    MICROBIOLOGY:        RADIOLOGY & ADDITIONAL TESTS:    Imaging Personally Reviewed:      < from: Xray Chest 2 Views PA/Lat (02.10.18 @ 00:49) >  IMPRESSION:    Right apical opacity likely upper lobe pneumonia.      < end of copied text >      [ ] Consultant(s) Notes Reviewed:  [ ] Care Discussed with Consultants/Other Providers:

## 2018-02-15 LAB
BASOPHILS # BLD AUTO: 0.02 K/UL — SIGNIFICANT CHANGE UP (ref 0–0.2)
BASOPHILS NFR BLD AUTO: 0.4 % — SIGNIFICANT CHANGE UP (ref 0–2)
BLD GP AB SCN SERPL QL: NEGATIVE — SIGNIFICANT CHANGE UP
BUN SERPL-MCNC: 28 MG/DL — HIGH (ref 7–23)
CALCIUM SERPL-MCNC: 8.6 MG/DL — SIGNIFICANT CHANGE UP (ref 8.4–10.5)
CHLORIDE SERPL-SCNC: 91 MMOL/L — LOW (ref 98–107)
CO2 SERPL-SCNC: 27 MMOL/L — SIGNIFICANT CHANGE UP (ref 22–31)
CREAT SERPL-MCNC: 6.39 MG/DL — HIGH (ref 0.5–1.3)
EOSINOPHIL # BLD AUTO: 0.35 K/UL — SIGNIFICANT CHANGE UP (ref 0–0.5)
EOSINOPHIL NFR BLD AUTO: 7.4 % — HIGH (ref 0–6)
GLUCOSE SERPL-MCNC: 92 MG/DL — SIGNIFICANT CHANGE UP (ref 70–99)
HCT VFR BLD CALC: 23.3 % — LOW (ref 39–50)
HGB BLD-MCNC: 7.9 G/DL — LOW (ref 13–17)
IMM GRANULOCYTES # BLD AUTO: 0.02 # — SIGNIFICANT CHANGE UP
IMM GRANULOCYTES NFR BLD AUTO: 0.4 % — SIGNIFICANT CHANGE UP (ref 0–1.5)
INR BLD: 5.92 — CRITICAL HIGH (ref 0.88–1.17)
LYMPHOCYTES # BLD AUTO: 0.38 K/UL — LOW (ref 1–3.3)
LYMPHOCYTES # BLD AUTO: 8 % — LOW (ref 13–44)
MAGNESIUM SERPL-MCNC: 2.1 MG/DL — SIGNIFICANT CHANGE UP (ref 1.6–2.6)
MCHC RBC-ENTMCNC: 31.5 PG — SIGNIFICANT CHANGE UP (ref 27–34)
MCHC RBC-ENTMCNC: 33.9 % — SIGNIFICANT CHANGE UP (ref 32–36)
MCV RBC AUTO: 92.8 FL — SIGNIFICANT CHANGE UP (ref 80–100)
MONOCYTES # BLD AUTO: 0.59 K/UL — SIGNIFICANT CHANGE UP (ref 0–0.9)
MONOCYTES NFR BLD AUTO: 12.5 % — SIGNIFICANT CHANGE UP (ref 2–14)
NEUTROPHILS # BLD AUTO: 3.37 K/UL — SIGNIFICANT CHANGE UP (ref 1.8–7.4)
NEUTROPHILS NFR BLD AUTO: 71.3 % — SIGNIFICANT CHANGE UP (ref 43–77)
NRBC # FLD: 0 — SIGNIFICANT CHANGE UP
PLATELET # BLD AUTO: 189 K/UL — SIGNIFICANT CHANGE UP (ref 150–400)
PMV BLD: 10.2 FL — SIGNIFICANT CHANGE UP (ref 7–13)
POTASSIUM SERPL-MCNC: 4 MMOL/L — SIGNIFICANT CHANGE UP (ref 3.5–5.3)
POTASSIUM SERPL-SCNC: 4 MMOL/L — SIGNIFICANT CHANGE UP (ref 3.5–5.3)
PROTHROM AB SERPL-ACNC: 70.6 SEC — HIGH (ref 9.8–13.1)
RBC # BLD: 2.51 M/UL — LOW (ref 4.2–5.8)
RBC # FLD: 15.1 % — HIGH (ref 10.3–14.5)
RH IG SCN BLD-IMP: POSITIVE — SIGNIFICANT CHANGE UP
SODIUM SERPL-SCNC: 134 MMOL/L — LOW (ref 135–145)
WBC # BLD: 4.73 K/UL — SIGNIFICANT CHANGE UP (ref 3.8–10.5)
WBC # FLD AUTO: 4.73 K/UL — SIGNIFICANT CHANGE UP (ref 3.8–10.5)

## 2018-02-15 PROCEDURE — 93306 TTE W/DOPPLER COMPLETE: CPT | Mod: 26

## 2018-02-15 PROCEDURE — 99233 SBSQ HOSP IP/OBS HIGH 50: CPT

## 2018-02-15 PROCEDURE — 99232 SBSQ HOSP IP/OBS MODERATE 35: CPT

## 2018-02-15 RX ADMIN — Medication 200 MILLIGRAM(S): at 22:52

## 2018-02-15 RX ADMIN — Medication 200 MILLIGRAM(S): at 04:09

## 2018-02-15 RX ADMIN — Medication 1 TABLET(S): at 17:17

## 2018-02-15 RX ADMIN — SEVELAMER CARBONATE 2400 MILLIGRAM(S): 2400 POWDER, FOR SUSPENSION ORAL at 17:17

## 2018-02-15 RX ADMIN — ATORVASTATIN CALCIUM 40 MILLIGRAM(S): 80 TABLET, FILM COATED ORAL at 22:52

## 2018-02-15 RX ADMIN — Medication 25 MILLIGRAM(S): at 05:26

## 2018-02-15 RX ADMIN — SEVELAMER CARBONATE 2400 MILLIGRAM(S): 2400 POWDER, FOR SUSPENSION ORAL at 09:17

## 2018-02-15 RX ADMIN — Medication 1 TABLET(S): at 05:25

## 2018-02-15 RX ADMIN — Medication 1 MILLIGRAM(S): at 09:20

## 2018-02-15 RX ADMIN — DONEPEZIL HYDROCHLORIDE 5 MILLIGRAM(S): 10 TABLET, FILM COATED ORAL at 22:52

## 2018-02-15 NOTE — PROGRESS NOTE ADULT - SUBJECTIVE AND OBJECTIVE BOX
Cardiology/Vascular Medicine Inpatient Progress Note    No new events/complaints.  Remains lethargic.  Telemetry: Afib 60s-70s bpm  INR 5.92       Vital Signs Last 24 Hrs  T(C): 37.3 (15 Feb 2018 04:12), Max: 37.4 (14 Feb 2018 19:26)  T(F): 99.2 (15 Feb 2018 04:12), Max: 99.4 (14 Feb 2018 19:26)  HR: 90 (15 Feb 2018 04:12) (80 - 96)  BP: 155/64 (15 Feb 2018 04:12) (132/58 - 155/64)  BP(mean): 132 (14 Feb 2018 11:45) (132 - 132)  RR: 18 (15 Feb 2018 04:12) (16 - 20)  SpO2: 95% (15 Feb 2018 04:12) (95% - 97%)    Appearance: Elderly man, NAD  HEENT:   Normal oral mucosa, PERRL, EOMI	  Lymphatic: No lymphadenopathy  Cardiovascular: Irreg irreg S1 S2, No JVD, +click  Respiratory: Decreased BS b/l  Psychiatry: awake, able to answer questions  Gastrointestinal:  Soft, Non-tender, + BS	  Skin: +ecchymoses  Neurologic: Non-focal  Extremities: Normal range of motion, No clubbing, cyanosis or edema  Vascular: Peripheral pulses palpable 2+ bilaterally    MEDICATIONS  (STANDING):  atorvastatin 40 milliGRAM(s) Oral at bedtime  donepezil 5 milliGRAM(s) Oral at bedtime  epoetin angelica Injectable 4000 Unit(s) IV Push <User Schedule>  folic acid 1 milliGRAM(s) Oral daily  lactobacillus acidophilus 1 Tablet(s) Oral every 12 hours  levoFLOXacin  Tablet 500 milliGRAM(s) Oral every 48 hours  metoprolol succinate ER 25 milliGRAM(s) Oral daily  sevelamer hydrochloride 2400 milliGRAM(s) Oral two times a day with meals    MEDICATIONS  (PRN):  guaiFENesin   Syrup  (Sugar-Free) 200 milliGRAM(s) Oral every 6 hours PRN Cough        LABS:	 	                                   7.9    4.73  )-----------( 189      ( 15 Feb 2018 05:45 )             23.3   02-15    134<L>  |  91<L>  |  28<H>  ----------------------------<  92  4.0   |  27  |  6.39<H>    Ca    8.6      15 Feb 2018 05:45  Mg     2.1     02-15       PT/INR - ( 15 Feb 2018 05:45 )   PT: 70.6 SEC;   INR: 5.92     PT/INR - ( 15 Feb 2018 05:45 )   PT: 70.6 SEC;   INR: 5.92          < from: CT Chest No Cont (02.10.18 @ 11:09) >    EXAM:  CT CHEST        PROCEDURE DATE:  Feb 10 2018         INTERPRETATION:  CLINICAL INFORMATION: Cough and fever    COMPARISON: CT abdomen from 7/1/2014    PROCEDURE:   CT of the Chest was performed without intravenous contrast.  Sagittal and coronal reformats were performed.      FINDINGS:    CHEST:     LUNGS AND LARGE AIRWAYS: Patent central airways.  Patchy airspace   consolidations most prominent in the right upper lobe and bilateral lower   lobes and to a lesser extent within the posterior left upper lobe   consistent with multifocal pneumonia.  PLEURA: Small to moderate right and small left pleural effusions.  VESSELS: Atherosclerotic changes of the aorta and coronary arteries.  HEART: Cardiomegaly with marked biatrial enlargement.CABG. Mitral valve   replacement.  No pericardial effusion.   MEDIASTINUM AND CHELE: No lymphadenopathy.  CHEST WALL AND LOWER NECK: Sternotomy.  VISUALIZED UPPER ABDOMEN: Cirrhosis. A few scattered hepatic cysts.   Atrophic kidneys.  BONES: Spinal degenerative changes.    IMPRESSION:     Multifocal pneumonia.    Bilateral pleural effusions.    Cirrhosis.      MORGAN LIU M.D., RADIOLOGY RESIDENT  This document has been electronically signed.  CHARITO RODRIGUEZ M.D., ATTENDING RADIOLOGIST  This document has been electronically signed. Feb 10 2018  1:11PM

## 2018-02-15 NOTE — PROGRESS NOTE ADULT - SUBJECTIVE AND OBJECTIVE BOX
CHIEF COMPLAINT: Patient is a 72y old  male who presents with a chief complaint of Cough (10 Feb 2018 03:15)      SUBJECTIVE / OVERNIGHT EVENTS:    No complaints. Denies chest pain. Denies SOB. Denies abdominal pain.    MEDICATIONS  (STANDING):  atorvastatin 40 milliGRAM(s) Oral at bedtime  donepezil 5 milliGRAM(s) Oral at bedtime  epoetin angelica Injectable 4000 Unit(s) IV Push <User Schedule>  folic acid 1 milliGRAM(s) Oral daily  lactobacillus acidophilus 1 Tablet(s) Oral every 12 hours  levoFLOXacin  Tablet 500 milliGRAM(s) Oral every 48 hours  metoprolol succinate ER 25 milliGRAM(s) Oral daily  sevelamer hydrochloride 2400 milliGRAM(s) Oral two times a day with meals    MEDICATIONS  (PRN):  guaiFENesin   Syrup  (Sugar-Free) 200 milliGRAM(s) Oral every 6 hours PRN Cough      VITALS:  T(F): 99.2 (02-15-18 @ 04:12), Max: 99.4 (02-14-18 @ 19:26)  HR: 90 (02-15-18 @ 04:12) (90 - 96)  BP: 155/64 (02-15-18 @ 04:12) (139/69 - 155/64)  RR: 18 (02-15-18 @ 04:12) (16 - 20)  SpO2: 95% (02-15-18 @ 04:12)      CAPILLARY BLOOD GLUCOSE    Output     I&O's Summary  T(F): 99.2 (02-15-18 @ 04:12), Max: 99.4 (02-14-18 @ 19:26)  HR: 90 (02-15-18 @ 04:12) (90 - 96)  BP: 155/64 (02-15-18 @ 04:12) (139/69 - 155/64)  RR: 18 (02-15-18 @ 04:12) (16 - 20)  SpO2: 95% (02-15-18 @ 04:12)    PHYSICAL EXAM:  GENERAL: NAD, well-developed  HEAD:  Atraumatic, Normocephalic  EYES: EOMI  NECK: Supple, No JVD  CHEST/LUNG: nonlabored breathing  HEART: nl S1/S2  ABDOMEN: nondistended, soft  EXTREMITIES:  no LE edema  PSYCH: A&Ox3  NEUROLOGY: non-focal  SKIN: No rashes noted    LABS:              7.9                  134  | 27   | 28           4.73  >-----------< 189     ------------------------< 92                    23.3                 4.0  | 91   | 6.39                                         Ca 8.6   Mg 2.1   Ph x            INR: 5.92<HH>;    PT: 70.6 SEC<H>;    PTT: x                    MICROBIOLOGY:        RADIOLOGY & ADDITIONAL TESTS:    < from: CT Chest No Cont (02.10.18 @ 11:09) >  IMPRESSION:     Multifocal pneumonia.    Bilateral pleural effusions.    Cirrhosis.    < end of copied text >      Imaging Personally Reviewed:    [ ] Consultant(s) Notes Reviewed:  [ ] Care Discussed with Consultants/Other Providers: CHIEF COMPLAINT: Patient is a 72y old  male who presents with a chief complaint of Cough (10 Feb 2018 03:15)      SUBJECTIVE / OVERNIGHT EVENTS:    No complaints. Denies chest pain. Denies SOB. Denies abdominal pain.    MEDICATIONS  (STANDING):  atorvastatin 40 milliGRAM(s) Oral at bedtime  donepezil 5 milliGRAM(s) Oral at bedtime  epoetin angelica Injectable 4000 Unit(s) IV Push <User Schedule>  folic acid 1 milliGRAM(s) Oral daily  lactobacillus acidophilus 1 Tablet(s) Oral every 12 hours  levoFLOXacin  Tablet 500 milliGRAM(s) Oral every 48 hours  metoprolol succinate ER 25 milliGRAM(s) Oral daily  sevelamer hydrochloride 2400 milliGRAM(s) Oral two times a day with meals    MEDICATIONS  (PRN):  guaiFENesin   Syrup  (Sugar-Free) 200 milliGRAM(s) Oral every 6 hours PRN Cough      VITALS:  T(F): 99.2 (02-15-18 @ 04:12), Max: 99.4 (02-14-18 @ 19:26)  HR: 90 (02-15-18 @ 04:12) (90 - 96)  BP: 155/64 (02-15-18 @ 04:12) (139/69 - 155/64)  RR: 18 (02-15-18 @ 04:12) (16 - 20)  SpO2: 95% (02-15-18 @ 04:12)      CAPILLARY BLOOD GLUCOSE    Output     I&O's Summary  T(F): 99.2 (02-15-18 @ 04:12), Max: 99.4 (02-14-18 @ 19:26)  HR: 90 (02-15-18 @ 04:12) (90 - 96)  BP: 155/64 (02-15-18 @ 04:12) (139/69 - 155/64)  RR: 18 (02-15-18 @ 04:12) (16 - 20)  SpO2: 95% (02-15-18 @ 04:12)    PHYSICAL EXAM:  GENERAL: NAD, well-developed  HEAD:  Atraumatic, Normocephalic  EYES: EOMI  NECK: Supple, No JVD  CHEST/LUNG: nonlabored breathing  HEART: nl S1/S2  ABDOMEN: nondistended, soft  EXTREMITIES:  no LE edema  PSYCH: A&Ox3  NEUROLOGY: non-focal  SKIN: No rashes noted    LABS:              7.9                  134  | 27   | 28           4.73  >-----------< 189     ------------------------< 92                    23.3                 4.0  | 91   | 6.39                                         Ca 8.6   Mg 2.1   Ph x            INR: 5.92<HH>;    PT: 70.6 SEC<H>;    PTT: x                    MICROBIOLOGY:        RADIOLOGY & ADDITIONAL TESTS:    < from: CT Chest No Cont (02.10.18 @ 11:09) >  IMPRESSION:     Multifocal pneumonia.    Bilateral pleural effusions.    Cirrhosis.    < end of copied text >          Imaging Personally Reviewed:    CT chest images reviewed    [ x ] Consultant(s) Notes Reviewed: cardiology  [ ] Care Discussed with Consultants/Other Providers:

## 2018-02-15 NOTE — PROGRESS NOTE ADULT - ASSESSMENT
Admitted with multifocal PNA, started on Levaquin   Mechanical (mitral?) valve with AFib on warfarin  Supratherapeutic INR without evidence of active bleeding (INR: 7.67 --> 7.12 today)  Allow INR to drift down.  No need for reversal with Vit K.  Anemia noted, no active bleeding (h/o ESRD/HD).  Caution that co-administration with abx such as Levaquin can increase INR.  INR drifting down as expected.  Will follow.  D/W team

## 2018-02-15 NOTE — PROGRESS NOTE ADULT - ASSESSMENT
72M with PMH of ESRD on HD (MWF- full session) via LUE AVF, afib, mechanical mitral valve repair (on warfarin) p/w cough x 3 weeks admitted for possible pna c/b afib w/ RVR in ED.     *Multilobar pneumonia: suspect gram negative pneumonia, also possible staph aureus pneumonia given recent influenza. RVP positive for Flu  - clinically improved from pneumonia  - changed antibiotics to Levaquin - plan for 10 day course  - CT chest with bilateral effusions  - appreciate pulmonary input - doubt infected pleural space, monitor on antibiotics for now    *Supratherapeutic INR: on coumadin for mechanical MVR with a fib  - hgb respondedly appropriately to transfusion  - monitor for evidence of bleeding  - monitor BM's  - FOBT  - cardiology consulted, appreciate recommendations  - allow INR to drift down  - once INR < 3.5, start low dose coumadin daily with goal INR 2.5 - 3.5    *Anemia: possible acute blood anemia in setting of elevated INR. Likely anemia 2/2 CKD.  - baseline Hgb unknown - obtain outpatient labs  - transfuse 1 unit PRBC's with HD today given hgb < 7  - CHRISTEN with HD per renal  - active T&S  - FOBT  - transfuse if hgb < 7    *Influenza: complete 5 day course of Tamiflu    *A fib with RVR: RVR likely 2/2 infection  - management of infection as above  - cont tele  - HR now controlled  - cont toprol XL 25 mg daily    *Mechanical MVR - also has A fib  - INR goal 2.5 - 3.5  - INR continues to be supratherapeutic, likely 2/2 poor PO intake  - cardiology consult re: management of AC in this setting  - monitor INR daily    *ESRD on HD:  - renal consult appreciated    *Cirrhosis: seen incidentally on CT chest  - bilirubin normal, albumin slightly decreased in setting of infection  - will corroborate history with patient/family    *HTN: continue home meds  - monitor BP closely    *Hyponatremia: likely in setting of ESRD  - monitor      *FEN/GI: renal diet    *Ppx: supratherapeutic INR      *Dispo: INR elevated - anticipate d/c once INR < 4.5, if cardiology in agreement  - PT consult  - OOBTC

## 2018-02-16 LAB
BASOPHILS # BLD AUTO: 0.02 K/UL — SIGNIFICANT CHANGE UP (ref 0–0.2)
BASOPHILS NFR BLD AUTO: 0.5 % — SIGNIFICANT CHANGE UP (ref 0–2)
BUN SERPL-MCNC: 41 MG/DL — HIGH (ref 7–23)
CALCIUM SERPL-MCNC: 8.5 MG/DL — SIGNIFICANT CHANGE UP (ref 8.4–10.5)
CHLORIDE SERPL-SCNC: 91 MMOL/L — LOW (ref 98–107)
CO2 SERPL-SCNC: 26 MMOL/L — SIGNIFICANT CHANGE UP (ref 22–31)
CREAT SERPL-MCNC: 8.5 MG/DL — HIGH (ref 0.5–1.3)
EOSINOPHIL # BLD AUTO: 0.56 K/UL — HIGH (ref 0–0.5)
EOSINOPHIL NFR BLD AUTO: 12.7 % — HIGH (ref 0–6)
GLUCOSE SERPL-MCNC: 89 MG/DL — SIGNIFICANT CHANGE UP (ref 70–99)
HCT VFR BLD CALC: 22.6 % — LOW (ref 39–50)
HGB BLD-MCNC: 7.2 G/DL — LOW (ref 13–17)
IMM GRANULOCYTES # BLD AUTO: 0.02 # — SIGNIFICANT CHANGE UP
IMM GRANULOCYTES NFR BLD AUTO: 0.5 % — SIGNIFICANT CHANGE UP (ref 0–1.5)
INR BLD: 6.62 — CRITICAL HIGH (ref 0.88–1.17)
LYMPHOCYTES # BLD AUTO: 0.45 K/UL — LOW (ref 1–3.3)
LYMPHOCYTES # BLD AUTO: 10.2 % — LOW (ref 13–44)
MAGNESIUM SERPL-MCNC: 2.3 MG/DL — SIGNIFICANT CHANGE UP (ref 1.6–2.6)
MCHC RBC-ENTMCNC: 29.6 PG — SIGNIFICANT CHANGE UP (ref 27–34)
MCHC RBC-ENTMCNC: 31.9 % — LOW (ref 32–36)
MCV RBC AUTO: 93 FL — SIGNIFICANT CHANGE UP (ref 80–100)
MONOCYTES # BLD AUTO: 0.48 K/UL — SIGNIFICANT CHANGE UP (ref 0–0.9)
MONOCYTES NFR BLD AUTO: 10.9 % — SIGNIFICANT CHANGE UP (ref 2–14)
NEUTROPHILS # BLD AUTO: 2.87 K/UL — SIGNIFICANT CHANGE UP (ref 1.8–7.4)
NEUTROPHILS NFR BLD AUTO: 65.2 % — SIGNIFICANT CHANGE UP (ref 43–77)
NRBC # FLD: 0 — SIGNIFICANT CHANGE UP
PLATELET # BLD AUTO: 197 K/UL — SIGNIFICANT CHANGE UP (ref 150–400)
PMV BLD: 10.2 FL — SIGNIFICANT CHANGE UP (ref 7–13)
POTASSIUM SERPL-MCNC: 4.2 MMOL/L — SIGNIFICANT CHANGE UP (ref 3.5–5.3)
POTASSIUM SERPL-SCNC: 4.2 MMOL/L — SIGNIFICANT CHANGE UP (ref 3.5–5.3)
PROTHROM AB SERPL-ACNC: 76.4 SEC — HIGH (ref 9.8–13.1)
RBC # BLD: 2.43 M/UL — LOW (ref 4.2–5.8)
RBC # FLD: 15.2 % — HIGH (ref 10.3–14.5)
SODIUM SERPL-SCNC: 133 MMOL/L — LOW (ref 135–145)
WBC # BLD: 4.4 K/UL — SIGNIFICANT CHANGE UP (ref 3.8–10.5)
WBC # FLD AUTO: 4.4 K/UL — SIGNIFICANT CHANGE UP (ref 3.8–10.5)

## 2018-02-16 PROCEDURE — 99233 SBSQ HOSP IP/OBS HIGH 50: CPT | Mod: GC

## 2018-02-16 PROCEDURE — 99233 SBSQ HOSP IP/OBS HIGH 50: CPT

## 2018-02-16 PROCEDURE — 99232 SBSQ HOSP IP/OBS MODERATE 35: CPT

## 2018-02-16 RX ORDER — ERYTHROPOIETIN 10000 [IU]/ML
4000 INJECTION, SOLUTION INTRAVENOUS; SUBCUTANEOUS ONCE
Qty: 0 | Refills: 0 | Status: COMPLETED | OUTPATIENT
Start: 2018-02-16 | End: 2018-02-16

## 2018-02-16 RX ADMIN — Medication 25 MILLIGRAM(S): at 05:57

## 2018-02-16 RX ADMIN — SEVELAMER CARBONATE 2400 MILLIGRAM(S): 2400 POWDER, FOR SUSPENSION ORAL at 18:21

## 2018-02-16 RX ADMIN — Medication 1 TABLET(S): at 05:57

## 2018-02-16 RX ADMIN — Medication 200 MILLIGRAM(S): at 05:57

## 2018-02-16 RX ADMIN — SEVELAMER CARBONATE 2400 MILLIGRAM(S): 2400 POWDER, FOR SUSPENSION ORAL at 11:12

## 2018-02-16 RX ADMIN — Medication 1 TABLET(S): at 18:21

## 2018-02-16 RX ADMIN — ERYTHROPOIETIN 4000 UNIT(S): 10000 INJECTION, SOLUTION INTRAVENOUS; SUBCUTANEOUS at 18:52

## 2018-02-16 RX ADMIN — Medication 1 MILLIGRAM(S): at 18:21

## 2018-02-16 RX ADMIN — ATORVASTATIN CALCIUM 40 MILLIGRAM(S): 80 TABLET, FILM COATED ORAL at 21:39

## 2018-02-16 RX ADMIN — DONEPEZIL HYDROCHLORIDE 5 MILLIGRAM(S): 10 TABLET, FILM COATED ORAL at 21:40

## 2018-02-16 NOTE — PROGRESS NOTE ADULT - ASSESSMENT
72M with PMH of ESRD on HD (MWF- full session) via LUE AVF, afib, mechanical mitral valve repair (on warfarin) p/w cough x 3 weeks admitted for possible pna c/b afib w/ RVR in ED.     *Supratherapeutic INR: on coumadin for mechanical MVR with a fib  - hgb respondedly appropriately to transfusion  - monitor for evidence of bleeding  - monitor BM's  - encourage intake of foods containing vit K  - FOBT  - cardiology consulted, appreciate recommendations  - allow INR to drift down  - once INR < 3.5, start low dose coumadin daily with goal INR 2.5 - 3.5    *Anemia: possible acute blood anemia in setting of elevated INR. Likely anemia 2/2 CKD  - worsening today, hgb 7.2  - GI consult to assess for GI blood loss  - FOBT  - CHRISTEN with HD per renal  - active T&S  - transfuse if hgb < 7    *Multilobar pneumonia: suspect gram negative pneumonia, also possible staph aureus pneumonia given recent influenza. RVP positive for Flu  - clinically improved from pneumonia  - changed antibiotics to Levaquin - to complete a 10 day course of total abx  - CT chest with bilateral effusions  - appreciate pulmonary input - doubt infected pleural space, monitor on antibiotics for now    *Influenza: complete 5 day course of Tamiflu    *A fib with RVR: RVR likely 2/2 infection  - management of infection as above  - cont tele  - HR now controlled  - cont toprol XL 25 mg daily    *Mechanical MVR - also has A fib  - INR goal 2.5 - 3.5  - INR continues to be supratherapeutic, likely 2/2 poor PO intake  - cardiology consult re: management of AC in this setting  - monitor INR daily    *ESRD on HD:  - renal consult appreciated    *Cirrhosis: seen incidentally on CT chest  - bilirubin normal, albumin slightly decreased in setting of infection  - will corroborate history with patient/family    *HTN: continue home meds  - monitor BP closely    *Hyponatremia: likely in setting of ESRD  - monitor      *FEN/GI: renal diet    *Ppx: supratherapeutic INR      *Dispo: pending medical stability, GI eval  - PT consult  - OOBTC

## 2018-02-16 NOTE — PROGRESS NOTE ADULT - SUBJECTIVE AND OBJECTIVE BOX
[facility] DIVISION OF KIDNEY DISEASES AND HYPERTENSION -- FOLLOW UP NOTE  --------------------------------------------------------------------------------  HPI: This is a 71 y/o M with a PMHx of HTN, MVR ESRD on HD MWF admitted for influenza PNA on IV ABX. Pt. gets HD at [facility] and Dr. Goldberg is his nephrologist. Pt. last got dialysis on 2/14, tolerated well without complications. Currently pt. resting in bed. Pt. states he has some SOB denies any CP, N/V.       PAST HISTORY  --------------------------------------------------------------------------------  No significant changes to PMH, PSH, FHx, SHx, unless otherwise noted    ALLERGIES & MEDICATIONS  --------------------------------------------------------------------------------  Allergies    No Known Allergies    Intolerances      Standing Inpatient Medications  atorvastatin 40 milliGRAM(s) Oral at bedtime  donepezil 5 milliGRAM(s) Oral at bedtime  epoetin angelica Injectable 4000 Unit(s) IV Push <User Schedule>  folic acid 1 milliGRAM(s) Oral daily  lactobacillus acidophilus 1 Tablet(s) Oral every 12 hours  levoFLOXacin  Tablet 500 milliGRAM(s) Oral every 48 hours  metoprolol succinate ER 25 milliGRAM(s) Oral daily  sevelamer hydrochloride 2400 milliGRAM(s) Oral two times a day with meals    PRN Inpatient Medications  guaiFENesin   Syrup  (Sugar-Free) 200 milliGRAM(s) Oral every 6 hours PRN      REVIEW OF SYSTEMS  --------------------------------------------------------------------------------  Unable to obtain.     VITALS/PHYSICAL EXAM  --------------------------------------------------------------------------------  T(C): 36.7 (02-16-18 @ 05:55), Max: 36.7 (02-15-18 @ 12:31)  HR: 89 (02-16-18 @ 05:55) (71 - 89)  BP: 158/62 (02-16-18 @ 05:55) (143/53 - 160/62)  RR: 12 (02-16-18 @ 05:55) (12 - 18)  SpO2: 95% (02-16-18 @ 05:55) (95% - 98%)  Wt(kg): --        02-15-18 @ 07:01  -  02-16-18 @ 07:00  --------------------------------------------------------  IN: 520 mL / OUT: 0 mL / NET: 520 mL      Physical Exam:  	Gen: NAD,   	HEENT: PERRL,   	Pulm: mild bibasilar crackles   	CV: RRR,   	Abd:  soft  	LE: Warm,  no edema  	Neuro: awake   	Skin: Warm, without rashes  	Vascular access: LUE AVF +thrill/bruit   LABS/STUDIES  --------------------------------------------------------------------------------              7.2    4.40  >-----------<  197      [02-16-18 @ 04:00]              22.6     133  |  91  |  41  ----------------------------<  89      [02-16-18 @ 04:00]  4.2   |  26  |  8.50        Ca     8.5     [02-16-18 @ 04:00]      Mg     2.3     [02-16-18 @ 04:00]      PT/INR: PT 76.4 , INR 6.62       [02-16-18 @ 04:00]      Creatinine Trend:  SCr 8.50 [02-16 @ 04:00]  SCr 6.39 [02-15 @ 05:45]  SCr 9.13 [02-14 @ 07:31]  SCr 6.92 [02-13 @ 07:40]  SCr 10.29 [02-12 @ 06:50]        Iron 30, TIBC 147, %sat --      [02-12-18 @ 06:50]  Ferritin 1670      [02-11-18 @ 06:10]  TSH 4.49      [02-10-18 @ 14:22]    HBsAg PRELIM POSITIVE REPEATED      [02-12-18 @ 10:33]

## 2018-02-16 NOTE — PROGRESS NOTE ADULT - PROBLEM SELECTOR PLAN 1
ESRD on HD MWF. Pt. last had HD on 2/14,  tolerated well without complications. AVF working well. Pt. clinically stable. Will arrange for maintenance HD today. Monitor BP and labs.

## 2018-02-16 NOTE — PROGRESS NOTE ADULT - SUBJECTIVE AND OBJECTIVE BOX
CHIEF COMPLAINT: Patient is a 72y old  male who presents with a chief complaint of Cough (10 Feb 2018 03:15)      SUBJECTIVE / OVERNIGHT EVENTS:    Patient reports feeling well. Reports mild cough - improved compared to prior. Denies abdominal pain. Denies SOB. No other complaints.    Called patient's wife Joanne and discussed patient's case and progress. Per wife, patient has poor appetite. Noticed that patient's food contains very little vegetables. Discussed INR persistently elevated - needs vitamin K foods in order for it to come down.    MEDICATIONS  (STANDING):  atorvastatin 40 milliGRAM(s) Oral at bedtime  donepezil 5 milliGRAM(s) Oral at bedtime  epoetin angelica Injectable 4000 Unit(s) IV Push <User Schedule>  folic acid 1 milliGRAM(s) Oral daily  lactobacillus acidophilus 1 Tablet(s) Oral every 12 hours  levoFLOXacin  Tablet 500 milliGRAM(s) Oral every 48 hours  metoprolol succinate ER 25 milliGRAM(s) Oral daily  sevelamer hydrochloride 2400 milliGRAM(s) Oral two times a day with meals    MEDICATIONS  (PRN):  guaiFENesin   Syrup  (Sugar-Free) 200 milliGRAM(s) Oral every 6 hours PRN Cough      VITALS:  T(F): 98.1 (02-16-18 @ 12:33), Max: 98.1 (02-16-18 @ 12:33)  HR: 86 (02-16-18 @ 12:33) (78 - 89)  BP: 147/75 (02-16-18 @ 12:33) (143/53 - 158/62)  RR: 16 (02-16-18 @ 12:33) (12 - 18)  SpO2: 98% (02-16-18 @ 12:33)      CAPILLARY BLOOD GLUCOSE    Output     I&O's Summary  T(F): 98.1 (02-16-18 @ 12:33), Max: 98.1 (02-16-18 @ 12:33)  HR: 86 (02-16-18 @ 12:33) (78 - 89)  BP: 147/75 (02-16-18 @ 12:33) (143/53 - 158/62)  RR: 16 (02-16-18 @ 12:33) (12 - 18)  SpO2: 98% (02-16-18 @ 12:33)    PHYSICAL EXAM:  GENERAL: NAD, well-developed  HEAD:  Atraumatic, Normocephalic  EYES: EOMI  NECK: Supple, No JVD  CHEST/LUNG: nonlabored breathing, CTAB  HEART: nl S1/S2  ABDOMEN: nondistended, soft  EXTREMITIES:  no LE edema  NEUROLOGY: hard of hearing  SKIN: No rashes noted    LABS:              7.2                  133  | 26   | 41           4.40  >-----------< 197     ------------------------< 89                    22.6                 4.2  | 91   | 8.50                                         Ca 8.5   Mg 2.3   Ph x            INR: 6.62<HH>;    PT: 76.4 SEC<H>;    PTT: x                    MICROBIOLOGY:        RADIOLOGY & ADDITIONAL TESTS:    Imaging Personally Reviewed:    [ ] Consultant(s) Notes Reviewed:  [ ] Care Discussed with Consultants/Other Providers:

## 2018-02-17 LAB
APTT BLD: 63.9 SEC — HIGH (ref 27.5–37.4)
BASOPHILS # BLD AUTO: 0.01 K/UL — SIGNIFICANT CHANGE UP (ref 0–0.2)
BASOPHILS NFR BLD AUTO: 0.2 % — SIGNIFICANT CHANGE UP (ref 0–2)
BUN SERPL-MCNC: 27 MG/DL — HIGH (ref 7–23)
CALCIUM SERPL-MCNC: 9.2 MG/DL — SIGNIFICANT CHANGE UP (ref 8.4–10.5)
CHLORIDE SERPL-SCNC: 91 MMOL/L — LOW (ref 98–107)
CO2 SERPL-SCNC: 25 MMOL/L — SIGNIFICANT CHANGE UP (ref 22–31)
CREAT SERPL-MCNC: 5.71 MG/DL — HIGH (ref 0.5–1.3)
EOSINOPHIL # BLD AUTO: 0.5 K/UL — SIGNIFICANT CHANGE UP (ref 0–0.5)
EOSINOPHIL NFR BLD AUTO: 11.3 % — HIGH (ref 0–6)
GLUCOSE SERPL-MCNC: 89 MG/DL — SIGNIFICANT CHANGE UP (ref 70–99)
HCT VFR BLD CALC: 23.5 % — LOW (ref 39–50)
HCT VFR BLD CALC: 23.5 % — LOW (ref 39–50)
HGB BLD-MCNC: 7.6 G/DL — LOW (ref 13–17)
HGB BLD-MCNC: 7.6 G/DL — LOW (ref 13–17)
IMM GRANULOCYTES # BLD AUTO: 0.02 # — SIGNIFICANT CHANGE UP
IMM GRANULOCYTES NFR BLD AUTO: 0.5 % — SIGNIFICANT CHANGE UP (ref 0–1.5)
INR BLD: 5.68 — CRITICAL HIGH (ref 0.88–1.17)
LYMPHOCYTES # BLD AUTO: 0.42 K/UL — LOW (ref 1–3.3)
LYMPHOCYTES # BLD AUTO: 9.5 % — LOW (ref 13–44)
MAGNESIUM SERPL-MCNC: 2.1 MG/DL — SIGNIFICANT CHANGE UP (ref 1.6–2.6)
MCHC RBC-ENTMCNC: 29.7 PG — SIGNIFICANT CHANGE UP (ref 27–34)
MCHC RBC-ENTMCNC: 29.7 PG — SIGNIFICANT CHANGE UP (ref 27–34)
MCHC RBC-ENTMCNC: 32.3 % — SIGNIFICANT CHANGE UP (ref 32–36)
MCHC RBC-ENTMCNC: 32.3 % — SIGNIFICANT CHANGE UP (ref 32–36)
MCV RBC AUTO: 91.8 FL — SIGNIFICANT CHANGE UP (ref 80–100)
MCV RBC AUTO: 91.8 FL — SIGNIFICANT CHANGE UP (ref 80–100)
MONOCYTES # BLD AUTO: 0.5 K/UL — SIGNIFICANT CHANGE UP (ref 0–0.9)
MONOCYTES NFR BLD AUTO: 11.3 % — SIGNIFICANT CHANGE UP (ref 2–14)
NEUTROPHILS # BLD AUTO: 2.96 K/UL — SIGNIFICANT CHANGE UP (ref 1.8–7.4)
NEUTROPHILS NFR BLD AUTO: 67.2 % — SIGNIFICANT CHANGE UP (ref 43–77)
NRBC # FLD: 0 — SIGNIFICANT CHANGE UP
NRBC # FLD: 0 — SIGNIFICANT CHANGE UP
PLATELET # BLD AUTO: 209 K/UL — SIGNIFICANT CHANGE UP (ref 150–400)
PLATELET # BLD AUTO: 209 K/UL — SIGNIFICANT CHANGE UP (ref 150–400)
PMV BLD: 10.2 FL — SIGNIFICANT CHANGE UP (ref 7–13)
PMV BLD: 10.2 FL — SIGNIFICANT CHANGE UP (ref 7–13)
POTASSIUM SERPL-MCNC: 3.9 MMOL/L — SIGNIFICANT CHANGE UP (ref 3.5–5.3)
POTASSIUM SERPL-SCNC: 3.9 MMOL/L — SIGNIFICANT CHANGE UP (ref 3.5–5.3)
PROTHROM AB SERPL-ACNC: 67.7 SEC — HIGH (ref 9.8–13.1)
RBC # BLD: 2.56 M/UL — LOW (ref 4.2–5.8)
RBC # BLD: 2.56 M/UL — LOW (ref 4.2–5.8)
RBC # FLD: 14.9 % — HIGH (ref 10.3–14.5)
RBC # FLD: 14.9 % — HIGH (ref 10.3–14.5)
SODIUM SERPL-SCNC: 132 MMOL/L — LOW (ref 135–145)
WBC # BLD: 4.41 K/UL — SIGNIFICANT CHANGE UP (ref 3.8–10.5)
WBC # BLD: 4.41 K/UL — SIGNIFICANT CHANGE UP (ref 3.8–10.5)
WBC # FLD AUTO: 4.41 K/UL — SIGNIFICANT CHANGE UP (ref 3.8–10.5)
WBC # FLD AUTO: 4.41 K/UL — SIGNIFICANT CHANGE UP (ref 3.8–10.5)

## 2018-02-17 PROCEDURE — 99233 SBSQ HOSP IP/OBS HIGH 50: CPT

## 2018-02-17 PROCEDURE — 99232 SBSQ HOSP IP/OBS MODERATE 35: CPT

## 2018-02-17 RX ADMIN — DONEPEZIL HYDROCHLORIDE 5 MILLIGRAM(S): 10 TABLET, FILM COATED ORAL at 21:48

## 2018-02-17 RX ADMIN — Medication 1 TABLET(S): at 05:42

## 2018-02-17 RX ADMIN — Medication 1 MILLIGRAM(S): at 09:59

## 2018-02-17 RX ADMIN — ATORVASTATIN CALCIUM 40 MILLIGRAM(S): 80 TABLET, FILM COATED ORAL at 21:48

## 2018-02-17 RX ADMIN — SEVELAMER CARBONATE 2400 MILLIGRAM(S): 2400 POWDER, FOR SUSPENSION ORAL at 09:59

## 2018-02-17 RX ADMIN — Medication 200 MILLIGRAM(S): at 05:45

## 2018-02-17 RX ADMIN — Medication 1 TABLET(S): at 18:49

## 2018-02-17 RX ADMIN — Medication 25 MILLIGRAM(S): at 05:42

## 2018-02-17 RX ADMIN — SEVELAMER CARBONATE 2400 MILLIGRAM(S): 2400 POWDER, FOR SUSPENSION ORAL at 18:49

## 2018-02-17 NOTE — PROGRESS NOTE ADULT - ASSESSMENT
Admitted with multifocal PNA, started on Levaquin   Mechanical (mitral?) valve with AFib on warfarin  Supratherapeutic INR without evidence of active bleeding (INR: 7.67 --> 7.12 today)  Allow INR to drift down.  No need for reversal with Vit K.  Anemia noted, no active bleeding (h/o ESRD/HD).  Caution that co-administration with abx such as Levaquin can increase INR.  INR drifting down as expected.  Will follow.  D/W team Admitted with multifocal PNA, started on Levaquin   Mechanical (mitral?) valve with AFib on warfarin  Supratherapeutic INR without evidence of active bleeding (INR: 7.67 --> 7.12 today)  Allow INR to drift down.  No need for reversal with Vit K.  Anemia noted, no active bleeding (h/o ESRD/HD).  Caution that co-administration with abx such as Levaquin can increase INR/keeping it elevated.  Will follow.  D/W team

## 2018-02-17 NOTE — CHART NOTE - NSCHARTNOTEFT_GEN_A_CORE
Discussed case with GI fellow regarding w/u for possible GI bleed, they do not feel that inpatient w/u is necessary considering there is no sign of active bleed and pt's H/H is stable since admission. Pt had iron studies previously during admission suggesting possible iron deficiency anemia. As per GI, pt can have w/u including EGD/colonscopy as outpatient. Occult stool pending. Will continue to trend H/H during admission and will re-consult if necessary.

## 2018-02-17 NOTE — PROGRESS NOTE ADULT - SUBJECTIVE AND OBJECTIVE BOX
Cardiology/Vascular Medicine Inpatient Progress Note    No new events/complaints.  Remains lethargic.  Telemetry: Afib 60s-70s bpm  INR: 6.62       Vital Signs Last 24 Hrs  T(C): 36.4 (17 Feb 2018 05:41), Max: 36.7 (16 Feb 2018 12:33)  T(F): 97.6 (17 Feb 2018 05:41), Max: 98.1 (16 Feb 2018 12:33)  HR: 83 (17 Feb 2018 05:41) (75 - 86)  BP: 142/75 (17 Feb 2018 05:41) (140/62 - 147/75)  BP(mean): --  RR: 13 (17 Feb 2018 05:41) (13 - 20)  SpO2: 97% (17 Feb 2018 05:41) (97% - 98%)    Appearance: Elderly man, NAD  HEENT:   Normal oral mucosa, PERRL, EOMI	  Lymphatic: No lymphadenopathy  Cardiovascular: Irreg irreg S1 S2, No JVD, +click  Respiratory: Decreased BS b/l  Psychiatry: awake, able to answer questions  Gastrointestinal:  Soft, Non-tender, + BS	  Skin: +ecchymoses  Neurologic: Non-focal  Extremities: Normal range of motion, No clubbing, cyanosis or edema  Vascular: Peripheral pulses palpable 2+ bilaterally    MEDICATIONS  (STANDING):  atorvastatin 40 milliGRAM(s) Oral at bedtime  donepezil 5 milliGRAM(s) Oral at bedtime  epoetin angelica Injectable 4000 Unit(s) IV Push <User Schedule>  folic acid 1 milliGRAM(s) Oral daily  lactobacillus acidophilus 1 Tablet(s) Oral every 12 hours  levoFLOXacin  Tablet 500 milliGRAM(s) Oral every 48 hours  metoprolol succinate ER 25 milliGRAM(s) Oral daily  sevelamer hydrochloride 2400 milliGRAM(s) Oral two times a day with meals    MEDICATIONS  (PRN):  guaiFENesin   Syrup  (Sugar-Free) 200 milliGRAM(s) Oral every 6 hours PRN Cough        LABS:	 	                          7.6    4.41  )-----------( 209      ( 17 Feb 2018 03:20 )             23.5   02-17    132<L>  |  91<L>  |  27<H>  ----------------------------<  89  3.9   |  25  |  5.71<H>    Ca    9.2      17 Feb 2018 03:20  Mg     2.1     02-17    PT/INR - ( 16 Feb 2018 04:00 )   PT: 76.4 SEC;   INR: 6.62               < from: CT Chest No Cont (02.10.18 @ 11:09) >    EXAM:  CT CHEST        PROCEDURE DATE:  Feb 10 2018         INTERPRETATION:  CLINICAL INFORMATION: Cough and fever    COMPARISON: CT abdomen from 7/1/2014    PROCEDURE:   CT of the Chest was performed without intravenous contrast.  Sagittal and coronal reformats were performed.      FINDINGS:    CHEST:     LUNGS AND LARGE AIRWAYS: Patent central airways.  Patchy airspace   consolidations most prominent in the right upper lobe and bilateral lower   lobes and to a lesser extent within the posterior left upper lobe   consistent with multifocal pneumonia.  PLEURA: Small to moderate right and small left pleural effusions.  VESSELS: Atherosclerotic changes of the aorta and coronary arteries.  HEART: Cardiomegaly with marked biatrial enlargement.CABG. Mitral valve   replacement.  No pericardial effusion.   MEDIASTINUM AND CHELE: No lymphadenopathy.  CHEST WALL AND LOWER NECK: Sternotomy.  VISUALIZED UPPER ABDOMEN: Cirrhosis. A few scattered hepatic cysts.   Atrophic kidneys.  BONES: Spinal degenerative changes.    IMPRESSION:     Multifocal pneumonia.    Bilateral pleural effusions.    Cirrhosis.      MORGAN LIU M.D., RADIOLOGY RESIDENT  This document has been electronically signed.  CHARITO RODRIGUEZ M.D., ATTENDING RADIOLOGIST  This document has been electronically signed. Feb 10 2018  1:11PM Cardiology/Vascular Medicine Inpatient Progress Note    No new events/complaints.  Remains lethargic.  Telemetry: Afib 60s-70s bpm  INR: 6.62   Went up from yesterday; unclear why this occurred.  Patient did not receive anticoagulation yesterday.  Remains on Levaquin which may be keeping INR elevated.    Vital Signs Last 24 Hrs  T(C): 36.4 (17 Feb 2018 05:41), Max: 36.7 (16 Feb 2018 12:33)  T(F): 97.6 (17 Feb 2018 05:41), Max: 98.1 (16 Feb 2018 12:33)  HR: 83 (17 Feb 2018 05:41) (75 - 86)  BP: 142/75 (17 Feb 2018 05:41) (140/62 - 147/75)  BP(mean): --  RR: 13 (17 Feb 2018 05:41) (13 - 20)  SpO2: 97% (17 Feb 2018 05:41) (97% - 98%)    Appearance: Elderly man, NAD  HEENT:   Normal oral mucosa, PERRL, EOMI	  Lymphatic: No lymphadenopathy  Cardiovascular: Irreg irreg S1 S2, No JVD, +click  Respiratory: Decreased BS b/l  Psychiatry: awake, able to answer questions  Gastrointestinal:  Soft, Non-tender, + BS	  Skin: +ecchymoses  Neurologic: Non-focal  Extremities: Normal range of motion, No clubbing, cyanosis or edema  Vascular: Peripheral pulses palpable 2+ bilaterally    MEDICATIONS  (STANDING):  atorvastatin 40 milliGRAM(s) Oral at bedtime  donepezil 5 milliGRAM(s) Oral at bedtime  epoetin angelica Injectable 4000 Unit(s) IV Push <User Schedule>  folic acid 1 milliGRAM(s) Oral daily  lactobacillus acidophilus 1 Tablet(s) Oral every 12 hours  levoFLOXacin  Tablet 500 milliGRAM(s) Oral every 48 hours  metoprolol succinate ER 25 milliGRAM(s) Oral daily  sevelamer hydrochloride 2400 milliGRAM(s) Oral two times a day with meals    MEDICATIONS  (PRN):  guaiFENesin   Syrup  (Sugar-Free) 200 milliGRAM(s) Oral every 6 hours PRN Cough        LABS:	 	                          7.6    4.41  )-----------( 209      ( 17 Feb 2018 03:20 )             23.5   02-17    132<L>  |  91<L>  |  27<H>  ----------------------------<  89  3.9   |  25  |  5.71<H>    Ca    9.2      17 Feb 2018 03:20  Mg     2.1     02-17    PT/INR - ( 16 Feb 2018 04:00 )   PT: 76.4 SEC;   INR: 6.62               < from: CT Chest No Cont (02.10.18 @ 11:09) >    EXAM:  CT CHEST        PROCEDURE DATE:  Feb 10 2018         INTERPRETATION:  CLINICAL INFORMATION: Cough and fever    COMPARISON: CT abdomen from 7/1/2014    PROCEDURE:   CT of the Chest was performed without intravenous contrast.  Sagittal and coronal reformats were performed.      FINDINGS:    CHEST:     LUNGS AND LARGE AIRWAYS: Patent central airways.  Patchy airspace   consolidations most prominent in the right upper lobe and bilateral lower   lobes and to a lesser extent within the posterior left upper lobe   consistent with multifocal pneumonia.  PLEURA: Small to moderate right and small left pleural effusions.  VESSELS: Atherosclerotic changes of the aorta and coronary arteries.  HEART: Cardiomegaly with marked biatrial enlargement.CABG. Mitral valve   replacement.  No pericardial effusion.   MEDIASTINUM AND CHELE: No lymphadenopathy.  CHEST WALL AND LOWER NECK: Sternotomy.  VISUALIZED UPPER ABDOMEN: Cirrhosis. A few scattered hepatic cysts.   Atrophic kidneys.  BONES: Spinal degenerative changes.    IMPRESSION:     Multifocal pneumonia.    Bilateral pleural effusions.    Cirrhosis.      MORGAN LIU M.D., RADIOLOGY RESIDENT  This document has been electronically signed.  CHARITO RODRIGUEZ M.D., ATTENDING RADIOLOGIST  This document has been electronically signed. Feb 10 2018  1:11PM

## 2018-02-17 NOTE — PROGRESS NOTE ADULT - SUBJECTIVE AND OBJECTIVE BOX
CHIEF COMPLAINT: Patient is a 72y old  male who presents with a chief complaint of Cough (10 Feb 2018 03:15)    SUBJECTIVE / OVERNIGHT EVENTS:    Patient reports feeling well. No new complaints Denies abdominal pain. Denies SOB. No other complaints. Wants to go home.     MEDICATIONS  (STANDING):  atorvastatin 40 milliGRAM(s) Oral at bedtime  donepezil 5 milliGRAM(s) Oral at bedtime  epoetin angelica Injectable 4000 Unit(s) IV Push <User Schedule>  folic acid 1 milliGRAM(s) Oral daily  lactobacillus acidophilus 1 Tablet(s) Oral every 12 hours  metoprolol succinate ER 25 milliGRAM(s) Oral daily  sevelamer hydrochloride 2400 milliGRAM(s) Oral two times a day with meals    MEDICATIONS  (PRN):  guaiFENesin   Syrup  (Sugar-Free) 200 milliGRAM(s) Oral every 6 hours PRN Cough    Vital Signs Last 24 Hrs  T(C): 36.4 (17 Feb 2018 05:41), Max: 36.4 (17 Feb 2018 05:41)  T(F): 97.6 (17 Feb 2018 05:41), Max: 97.6 (17 Feb 2018 05:41)  HR: 83 (17 Feb 2018 05:41) (75 - 85)  BP: 142/75 (17 Feb 2018 05:41) (140/62 - 146/65)  BP(mean): --  RR: 13 (17 Feb 2018 05:41) (13 - 20)  SpO2: 97% (17 Feb 2018 05:41) (97% - 97%)      PHYSICAL EXAM:  GENERAL: NAD, well-developed  HEAD:  Atraumatic, Normocephalic  EYES: EOMI  NECK: Supple, No JVD  CHEST/LUNG: Decreased bibasilar BS's   HEART: nl S1/S2  ABDOMEN: nondistended, soft  EXTREMITIES:  RUE with bleeding noted from AVF site, cleaning wrapped  SKIN: No rashes noted    LABS:                        7.6    4.41  )-----------( 209      ( 17 Feb 2018 03:20 )             23.5   02-17    132<L>  |  91<L>  |  27<H>  ----------------------------<  89  3.9   |  25  |  5.71<H>    Ca    9.2      17 Feb 2018 03:20  Mg     2.1     02-17          RADIOLOGY & ADDITIONAL TESTS:    Imaging Personally Reviewed:    [ ] Consultant(s) Notes Reviewed:  [ ] Care Discussed with Consultants/Other Providers:

## 2018-02-17 NOTE — PROGRESS NOTE ADULT - ASSESSMENT
72M with PMH of ESRD on HD (MWF- full session) via LUE AVF, afib, mechanical mitral valve repair (on warfarin) p/w cough x 3 weeks admitted for possible pna c/b afib w/ RVR in ED.     *Supratherapeutic INR: on coumadin for mechanical MVR with a fib  - hgb respondedly appropriately to transfusion  - encourage intake of foods containing vit K  - FOBT  - cardiology consulted, appreciate recommendations  - allow INR to drift down  - once INR < 3.5, start low dose coumadin daily with goal INR 2.5 - 3.5    *Anemia: possible acute blood anemia in setting of elevated INR from AVF site bleeding. Likely anemia 2/2 CKD  - Stable but low at 7.2  - Check FOBT, B12, folate  - Iron panel shows mixed picture of AOCD but likely partial BEAR  - CHRISTEN with HD per renal  - active T&S  - transfuse if hgb < 7  - If jaime blood or melena, GI consult    *Multilobar pneumonia: suspect gram negative pneumonia, also possible staph aureus pneumonia given recent influenza. RVP positive for Flu  - clinically improved from pneumonia  - S/p 7 days of antibiotics  - No clinical infection at this time  - DC Levaquin given persistent elevated INR likely due to antibiotic effect   - CT chest with bilateral effusions  - appreciate pulmonary input - doubt infected pleural space, monitor off antibiotics for now per conversation     *Influenza: completed 5 day course of Tamiflu    *A fib with RVR: RVR likely 2/2 infection  - management of infection as above  - cont tele  - HR now controlled  - cont toprol XL 25 mg daily    *Mechanical MVR - also has A fib  - INR goal 2.5 - 3.5  - INR continues to be supratherapeutic, likely 2/2 poor PO intake  - cardiology consult re: management of AC in this setting  - monitor INR daily    *ESRD on HD:  - renal consult appreciated    *Cirrhosis: seen incidentally on CT chest  - bilirubin normal, albumin slightly decreased in setting of infection  - will corroborate history with patient/family    *HTN: continue home meds  - monitor BP closely    *Hyponatremia: likely in setting of ESRD  - monitor    *FEN/GI: renal diet    *Ppx: supratherapeutic INR    *Dispo: pending medical stability, GI eval  - PT consult  - OOBTC

## 2018-02-18 LAB
APTT BLD: 45 SEC — HIGH (ref 27.5–37.4)
BUN SERPL-MCNC: 45 MG/DL — HIGH (ref 7–23)
CALCIUM SERPL-MCNC: 8.3 MG/DL — LOW (ref 8.4–10.5)
CHLORIDE SERPL-SCNC: 91 MMOL/L — LOW (ref 98–107)
CO2 SERPL-SCNC: 25 MMOL/L — SIGNIFICANT CHANGE UP (ref 22–31)
CREAT SERPL-MCNC: 8.18 MG/DL — HIGH (ref 0.5–1.3)
FOLATE SERPL-MCNC: 16.9 NG/ML — SIGNIFICANT CHANGE UP (ref 4.7–20)
GLUCOSE SERPL-MCNC: 85 MG/DL — SIGNIFICANT CHANGE UP (ref 70–99)
HCT VFR BLD CALC: 21.9 % — LOW (ref 39–50)
HGB BLD-MCNC: 7.1 G/DL — LOW (ref 13–17)
INR BLD: 2.68 — HIGH (ref 0.88–1.17)
MAGNESIUM SERPL-MCNC: 2.2 MG/DL — SIGNIFICANT CHANGE UP (ref 1.6–2.6)
MCHC RBC-ENTMCNC: 30.3 PG — SIGNIFICANT CHANGE UP (ref 27–34)
MCHC RBC-ENTMCNC: 32.4 % — SIGNIFICANT CHANGE UP (ref 32–36)
MCV RBC AUTO: 93.6 FL — SIGNIFICANT CHANGE UP (ref 80–100)
NRBC # FLD: 0 — SIGNIFICANT CHANGE UP
PLATELET # BLD AUTO: 205 K/UL — SIGNIFICANT CHANGE UP (ref 150–400)
PMV BLD: 10 FL — SIGNIFICANT CHANGE UP (ref 7–13)
POTASSIUM SERPL-MCNC: 4.4 MMOL/L — SIGNIFICANT CHANGE UP (ref 3.5–5.3)
POTASSIUM SERPL-SCNC: 4.4 MMOL/L — SIGNIFICANT CHANGE UP (ref 3.5–5.3)
PROTHROM AB SERPL-ACNC: 30.4 SEC — HIGH (ref 9.8–13.1)
RBC # BLD: 2.34 M/UL — LOW (ref 4.2–5.8)
RBC # FLD: 15.4 % — HIGH (ref 10.3–14.5)
RETICS #: 39 K/UL — SIGNIFICANT CHANGE UP (ref 25–125)
RETICS/RBC NFR: 1.7 % — SIGNIFICANT CHANGE UP (ref 0.5–2.5)
SODIUM SERPL-SCNC: 133 MMOL/L — LOW (ref 135–145)
VIT B12 SERPL-MCNC: 669 PG/ML — SIGNIFICANT CHANGE UP (ref 200–900)
WBC # BLD: 4.21 K/UL — SIGNIFICANT CHANGE UP (ref 3.8–10.5)
WBC # FLD AUTO: 4.21 K/UL — SIGNIFICANT CHANGE UP (ref 3.8–10.5)

## 2018-02-18 PROCEDURE — 99233 SBSQ HOSP IP/OBS HIGH 50: CPT

## 2018-02-18 PROCEDURE — 99232 SBSQ HOSP IP/OBS MODERATE 35: CPT

## 2018-02-18 PROCEDURE — 99239 HOSP IP/OBS DSCHRG MGMT >30: CPT

## 2018-02-18 RX ORDER — WARFARIN SODIUM 2.5 MG/1
2 TABLET ORAL ONCE
Qty: 0 | Refills: 0 | Status: COMPLETED | OUTPATIENT
Start: 2018-02-18 | End: 2018-02-18

## 2018-02-18 RX ORDER — WARFARIN SODIUM 2.5 MG/1
3 TABLET ORAL ONCE
Qty: 0 | Refills: 0 | Status: DISCONTINUED | OUTPATIENT
Start: 2018-02-18 | End: 2018-02-18

## 2018-02-18 RX ADMIN — Medication 1 TABLET(S): at 17:21

## 2018-02-18 RX ADMIN — SEVELAMER CARBONATE 2400 MILLIGRAM(S): 2400 POWDER, FOR SUSPENSION ORAL at 17:21

## 2018-02-18 RX ADMIN — Medication 1 TABLET(S): at 06:11

## 2018-02-18 RX ADMIN — WARFARIN SODIUM 2 MILLIGRAM(S): 2.5 TABLET ORAL at 17:21

## 2018-02-18 RX ADMIN — ATORVASTATIN CALCIUM 40 MILLIGRAM(S): 80 TABLET, FILM COATED ORAL at 21:54

## 2018-02-18 RX ADMIN — DONEPEZIL HYDROCHLORIDE 5 MILLIGRAM(S): 10 TABLET, FILM COATED ORAL at 21:54

## 2018-02-18 RX ADMIN — SEVELAMER CARBONATE 2400 MILLIGRAM(S): 2400 POWDER, FOR SUSPENSION ORAL at 08:51

## 2018-02-18 RX ADMIN — Medication 25 MILLIGRAM(S): at 06:11

## 2018-02-18 RX ADMIN — Medication 1 MILLIGRAM(S): at 08:51

## 2018-02-18 NOTE — PROGRESS NOTE ADULT - ASSESSMENT
72M with PMH of ESRD on HD (MWF- full session) via LUE AVF, afib, mechanical mitral valve repair (on warfarin) p/w cough x 3 weeks admitted for Flu s/p Tamiflu c/b afib w/ RVR and supratherapeutic INR.     *Supratherapeutic INR: on coumadin for mechanical MVR with a fib  - encourage intake of foods containing vit K  - cardiology consulted, appreciate recommendations  - INR now therapeutic at 2.68; given mechanical valve, will dose coumadin 2mg tonight  - Monitor INR in AM  - No active signs of bleeding    *Anemia: Likely anemia 2/2 CKD; no active bleeding identified   - Stable but low at 7.1  - B12/folate WNL  - Iron panel shows mixed picture of AOCD but likely partial BEAR  - CHRISTEN with HD per renal  - active T&S  - Plan for 1 unit of PRBC tomorrow with HD    *Multilobar pneumonia: suspect gram negative pneumonia, also possible staph aureus pneumonia given recent influenza. RVP positive for Flu  - clinically improved from pneumonia  - S/p 7 days of antibiotics  - No clinical infection at this time  - Marissa dc'ed    - CT chest with bilateral effusions  - appreciate pulmonary input - doubt infected pleural space, monitor off antibiotics for now per conversation     *Influenza: completed 5 day course of Tamiflu    *A fib with RVR: RVR likely 2/2 infection  - management of infection as above  - cont tele  - HR now controlled  - cont toprol XL 25 mg daily    *Mechanical MVR - also has A fib  - INR goal 2.5 - 3.5  - INR therapeutic  - Needs outpatient following up for coumadin dosing  - monitor INR daily    *ESRD on HD:  - renal consult appreciated  - HD planned for tomorrow, can be discharged home subsequently if remains stable     *Cirrhosis: seen incidentally on CT chest  - bilirubin normal, albumin slightly decreased in setting of infection  - will corroborate history with patient/family    *HTN: continue home meds  - monitor BP closely    *Hyponatremia: likely in setting of ESRD  - monitor    *FEN/GI: renal diet    *Ppx: supratherapeutic INR    *Dispo: PT re-consult placed; spoke with SW today, who will pass note on for possible DC with outpatient HD at [place] tomorrow

## 2018-02-18 NOTE — PROGRESS NOTE ADULT - SUBJECTIVE AND OBJECTIVE BOX
[facility] DIVISION OF KIDNEY DISEASES AND HYPERTENSION -- [phone]   FOLLOW UP NOTE  --------------------------------------------------------------------------------  HPI:  This is a 73 y/o M with a PMHx of HTN, MVR ESRD on HD MWF admitted for influenza PNA on IV ABX. Pt. gets HD at [place] [facility] and Dr. Goldberg is his nephrologist. Pt. last got dialysis on 2/16, tolerated well without complications. Currently pt. resting in bed. Pt.  denies any SOB,  CP, N/V.     PAST HISTORY  --------------------------------------------------------------------------------  No significant changes to PMH, PSH, FHx, SHx, unless otherwise noted    ALLERGIES & MEDICATIONS  --------------------------------------------------------------------------------  Allergies    No Known Allergies    Intolerances      Standing Inpatient Medications  atorvastatin 40 milliGRAM(s) Oral at bedtime  donepezil 5 milliGRAM(s) Oral at bedtime  epoetin angelica Injectable 4000 Unit(s) IV Push <User Schedule>  folic acid 1 milliGRAM(s) Oral daily  lactobacillus acidophilus 1 Tablet(s) Oral every 12 hours  metoprolol succinate ER 25 milliGRAM(s) Oral daily  sevelamer hydrochloride 2400 milliGRAM(s) Oral two times a day with meals    PRN Inpatient Medications  guaiFENesin   Syrup  (Sugar-Free) 200 milliGRAM(s) Oral every 6 hours PRN      REVIEW OF SYSTEMS  --------------------------------------------------------------------------------  General: no fever  CVS: no chest pain  RESP: no sob  ABD: no abdominal pain  MSK: no edema     VITALS/PHYSICAL EXAM  --------------------------------------------------------------------------------  T(C): 36.7 (02-18-18 @ 06:14), Max: 36.7 (02-18-18 @ 06:14)  HR: 78 (02-18-18 @ 06:14) (70 - 85)  BP: 146/56 (02-18-18 @ 06:14) (145/75 - 153/88)  RR: 18 (02-18-18 @ 06:14) (15 - 18)  SpO2: 100% (02-18-18 @ 06:14) (98% - 100%)  Wt(kg): --        02-17-18 @ 07:01  -  02-18-18 @ 07:00  --------------------------------------------------------  IN: 1000 mL / OUT: 450 mL / NET: 550 mL      Physical Exam:  Gen: NAD,   	HEENT: DENIS,   	Pulm: CTAB  	CV: RRR,   	Abd:  soft  	LE: Warm,  no edema  	Neuro: awake   	Skin: Warm, without rashes  	Vascular access: LUE AVF +thrill/bruit, aneurysmal dilation     LABS/STUDIES  --------------------------------------------------------------------------------              7.1    4.21  >-----------<  205      [02-18-18 @ 06:37]              21.9     133  |  91  |  45  ----------------------------<  85      [02-18-18 @ 06:30]  4.4   |  25  |  8.18        Ca     8.3     [02-18-18 @ 06:30]      Mg     2.2     [02-18-18 @ 06:30]      PT/INR: PT 30.4 , INR 2.68       [02-18-18 @ 06:37]  PTT: 45.0       [02-18-18 @ 06:37]      Creatinine Trend:  SCr 8.18 [02-18 @ 06:30]  SCr 5.71 [02-17 @ 03:20]  SCr 8.50 [02-16 @ 04:00]  SCr 6.39 [02-15 @ 05:45]  SCr 9.13 [02-14 @ 07:31]        Iron 30, TIBC 147, %sat --      [02-12-18 @ 06:50]  Ferritin 1670      [02-11-18 @ 06:10]  TSH 4.49      [02-10-18 @ 14:22]    HBsAg PRELIM POSITIVE REPEATED      [02-12-18 @ 10:33] [facility] DIVISION OF KIDNEY DISEASES AND HYPERTENSION -- [phone]   FOLLOW UP NOTE  --------------------------------------------------------------------------------  HPI: 72-year-old male with PMH of HTN, MVR, ESRD on HD TIW (MWF) admitted for influenza/PNA. Pt. gets HD at [place] [facility] and Dr. Goldberg is his nephrologist. Pt. last got dialysis on 2/16, tolerated well without complications. Currently pt. resting in bed. Pt.  denies any SOB,  CP, N/V.     PAST HISTORY  --------------------------------------------------------------------------------  No significant changes to PMH, PSH, FHx, SHx, unless otherwise noted    ALLERGIES & MEDICATIONS  --------------------------------------------------------------------------------  Allergies    No Known Allergies    Intolerances      Standing Inpatient Medications  atorvastatin 40 milliGRAM(s) Oral at bedtime  donepezil 5 milliGRAM(s) Oral at bedtime  epoetin angelica Injectable 4000 Unit(s) IV Push <User Schedule>  folic acid 1 milliGRAM(s) Oral daily  lactobacillus acidophilus 1 Tablet(s) Oral every 12 hours  metoprolol succinate ER 25 milliGRAM(s) Oral daily  sevelamer hydrochloride 2400 milliGRAM(s) Oral two times a day with meals    PRN Inpatient Medications  guaiFENesin   Syrup  (Sugar-Free) 200 milliGRAM(s) Oral every 6 hours PRN      REVIEW OF SYSTEMS  --------------------------------------------------------------------------------  General: no fever  CVS: no chest pain  RESP: no sob  ABD: no abdominal pain  MSK: no edema     VITALS/PHYSICAL EXAM  --------------------------------------------------------------------------------  T(C): 36.7 (02-18-18 @ 06:14), Max: 36.7 (02-18-18 @ 06:14)  HR: 78 (02-18-18 @ 06:14) (70 - 85)  BP: 146/56 (02-18-18 @ 06:14) (145/75 - 153/88)  RR: 18 (02-18-18 @ 06:14) (15 - 18)  SpO2: 100% (02-18-18 @ 06:14) (98% - 100%)  Wt(kg): --        02-17-18 @ 07:01  -  02-18-18 @ 07:00  --------------------------------------------------------  IN: 1000 mL / OUT: 450 mL / NET: 550 mL      Physical Exam:  Gen: NAD,   	HEENT: DENIS   	Pulm: CTAB  	CV: RRR,   	Abd:  soft  	LE: Warm,  no edema  	Neuro: awake   	Skin: Warm, without rashes  	Vascular access: LUE AVF +thrill/bruit, aneurysmal dilation     LABS/STUDIES  --------------------------------------------------------------------------------              7.1    4.21  >-----------<  205      [02-18-18 @ 06:37]              21.9     133  |  91  |  45  ----------------------------<  85      [02-18-18 @ 06:30]  4.4   |  25  |  8.18        Ca     8.3     [02-18-18 @ 06:30]      Mg     2.2     [02-18-18 @ 06:30]      PT/INR: PT 30.4 , INR 2.68       [02-18-18 @ 06:37]  PTT: 45.0       [02-18-18 @ 06:37]      Creatinine Trend:  SCr 8.18 [02-18 @ 06:30]  SCr 5.71 [02-17 @ 03:20]  SCr 8.50 [02-16 @ 04:00]  SCr 6.39 [02-15 @ 05:45]  SCr 9.13 [02-14 @ 07:31]        Iron 30, TIBC 147, %sat --      [02-12-18 @ 06:50]  Ferritin 1670      [02-11-18 @ 06:10]  TSH 4.49      [02-10-18 @ 14:22]    HBsAg PRELIM POSITIVE REPEATED      [02-12-18 @ 10:33] [facility] DIVISION OF KIDNEY DISEASES AND HYPERTENSION -- [phone]   FOLLOW UP NOTE  --------------------------------------------------------------------------------  HPI: 72-year-old male with PMH of HTN, MVR, ESRD on HD TIW (MWF) admitted for influenza/PNA. Pt. receives his outpatient HD treatments at [place] [facility]. Pt. had HD on 2/16, tolerated dialysis well. Currently, pt. resting in bed. Pt. says he wants to go home today. Pt. feels better and denies SOB, CP, HA or dizziness.     PAST HISTORY  --------------------------------------------------------------------------------  No significant changes to PMH, PSH, FHx, SHx, unless otherwise noted    ALLERGIES & MEDICATIONS  --------------------------------------------------------------------------------  Allergies    No Known Allergies    Intolerances    Standing Inpatient Medications  atorvastatin 40 milliGRAM(s) Oral at bedtime  donepezil 5 milliGRAM(s) Oral at bedtime  epoetin angelica Injectable 4000 Unit(s) IV Push <User Schedule>  folic acid 1 milliGRAM(s) Oral daily  lactobacillus acidophilus 1 Tablet(s) Oral every 12 hours  metoprolol succinate ER 25 milliGRAM(s) Oral daily  sevelamer hydrochloride 2400 milliGRAM(s) Oral two times a day with meals    REVIEW OF SYSTEMS  --------------------------------------------------------------------------------  General: no fever  CVS: no chest pain  RESP: no SOB  ABD: no abdominal pain  MSK: no edema     VITALS/PHYSICAL EXAM  --------------------------------------------------------------------------------  T(C): 36.7 (02-18-18 @ 06:14), Max: 36.7 (02-18-18 @ 06:14)  HR: 78 (02-18-18 @ 06:14) (70 - 85)  BP: 146/56 (02-18-18 @ 06:14) (145/75 - 153/88)  RR: 18 (02-18-18 @ 06:14) (15 - 18)  SpO2: 100% (02-18-18 @ 06:14) (98% - 100%)  Wt(kg): --    02-17-18 @ 07:01  -  02-18-18 @ 07:00  --------------------------------------------------------  IN: 1000 mL / OUT: 450 mL / NET: 550 mL    Physical Exam:              Gen: NAD   	HEENT: No JVD   	Pulm: clear B/L  	CV: S1S2+   	Abd:  soft  	LE: No edema  	Neuro: awake   	Skin: Warm  	Vascular access: UE AVF: +thrill     LABS/STUDIES  --------------------------------------------------------------------------------              7.1    4.21  >-----------<  205      [02-18-18 @ 06:37]              21.9     133  |  91  |  45  ----------------------------<  85      [02-18-18 @ 06:30]  4.4   |  25  |  8.18        Ca     8.3     [02-18-18 @ 06:30]      Mg     2.2     [02-18-18 @ 06:30]    Creatinine Trend:  SCr 8.18 [02-18 @ 06:30]  SCr 5.71 [02-17 @ 03:20]  SCr 8.50 [02-16 @ 04:00]  SCr 6.39 [02-15 @ 05:45]  SCr 9.13 [02-14 @ 07:31]    HBsAg PRELIM POSITIVE REPEATED      [02-12-18 @ 10:33]

## 2018-02-18 NOTE — PROGRESS NOTE ADULT - ASSESSMENT
This is a 73 y/o M with a PMHx of HTN, MVR ESRD on HD MWF admitted for influenza PNA on IV ABX. 72-year-old male with ERSD on HD TIW (MWF) admitted for influenza/PNA. Pt. had HD on 2/16.

## 2018-02-18 NOTE — PROGRESS NOTE ADULT - PROBLEM SELECTOR PLAN 3
Anemia in the setting of ESRD. Continue with CHRISTEN during HD. Monitor H/H. Pt. with low hemoglobin level. Pt. on CHRISTEN during HD. Consider blood transfusion. Monitor hemoglobin

## 2018-02-18 NOTE — PROGRESS NOTE ADULT - PROBLEM SELECTOR PLAN 1
ESRD on HD MWF. Pt. last had HD on 2/16,  tolerated well without complications. AVF working well. Pt. clinically stable. Plan for maintenance HD tomorrow. Monitor BP and labs Pt. with ESRD on HD TIW (MWF). Pt. had HD on 2/16,  tolerated it well without complications. Pt. clinically stable. Plan for maintenance HD tomorrow. Monitor BP and labs

## 2018-02-18 NOTE — PROGRESS NOTE ADULT - SUBJECTIVE AND OBJECTIVE BOX
Cardiology/Vascular Medicine Inpatient Progress Note    No new events/complaints.  Remains lethargic.  Telemetry: Afib 60s-70s bpm  INR: 5.68     Remains on Levaquin, along with poor PO intake, cirrhosis, FTT which may be keeping INR elevated.    Vital Signs Last 24 Hrs  T(C): 36.2 (17 Feb 2018 21:47), Max: 36.4 (17 Feb 2018 15:45)  T(F): 97.2 (17 Feb 2018 21:47), Max: 97.6 (17 Feb 2018 15:45)  HR: 70 (17 Feb 2018 21:47) (70 - 85)  BP: 153/88 (17 Feb 2018 21:47) (145/75 - 153/88)  BP(mean): --  RR: 18 (17 Feb 2018 21:47) (15 - 18)  SpO2: 100% (17 Feb 2018 21:47) (98% - 100%)    Appearance: Elderly man, NAD  HEENT:   Normal oral mucosa, PERRL, EOMI	  Lymphatic: No lymphadenopathy  Cardiovascular: Irreg irreg S1 S2, No JVD, +click  Respiratory: Decreased BS b/l  Psychiatry: awake, able to answer questions  Gastrointestinal:  Soft, Non-tender, + BS	  Skin: +ecchymoses  Neurologic: Non-focal  Extremities: Normal range of motion, No clubbing, cyanosis or edema  Vascular: Peripheral pulses palpable 2+ bilaterally    MEDICATIONS  (STANDING):  atorvastatin 40 milliGRAM(s) Oral at bedtime  donepezil 5 milliGRAM(s) Oral at bedtime  epoetin angelica Injectable 4000 Unit(s) IV Push <User Schedule>  folic acid 1 milliGRAM(s) Oral daily  lactobacillus acidophilus 1 Tablet(s) Oral every 12 hours  metoprolol succinate ER 25 milliGRAM(s) Oral daily  sevelamer hydrochloride 2400 milliGRAM(s) Oral two times a day with meals    MEDICATIONS  (PRN):  guaiFENesin   Syrup  (Sugar-Free) 200 milliGRAM(s) Oral every 6 hours PRN Cough        LABS:	 	                                              7.6    4.41  )-----------( 209      ( 17 Feb 2018 03:20 )             23.5     02-17    132<L>  |  91<L>  |  27<H>  ----------------------------<  89  3.9   |  25  |  5.71<H>    Ca    9.2      17 Feb 2018 03:20  Mg     2.1     02-17    PT/INR - ( 17 Feb 2018 03:20 )   PT: 67.7 SEC;   INR: 5.68     PTT - ( 17 Feb 2018 03:20 )  PTT:63.9 SEC          < from: CT Chest No Cont (02.10.18 @ 11:09) >    EXAM:  CT CHEST        PROCEDURE DATE:  Feb 10 2018         INTERPRETATION:  CLINICAL INFORMATION: Cough and fever    COMPARISON: CT abdomen from 7/1/2014    PROCEDURE:   CT of the Chest was performed without intravenous contrast.  Sagittal and coronal reformats were performed.      FINDINGS:    CHEST:     LUNGS AND LARGE AIRWAYS: Patent central airways.  Patchy airspace   consolidations most prominent in the right upper lobe and bilateral lower   lobes and to a lesser extent within the posterior left upper lobe   consistent with multifocal pneumonia.  PLEURA: Small to moderate right and small left pleural effusions.  VESSELS: Atherosclerotic changes of the aorta and coronary arteries.  HEART: Cardiomegaly with marked biatrial enlargement.CABG. Mitral valve   replacement.  No pericardial effusion.   MEDIASTINUM AND CHELE: No lymphadenopathy.  CHEST WALL AND LOWER NECK: Sternotomy.  VISUALIZED UPPER ABDOMEN: Cirrhosis. A few scattered hepatic cysts.   Atrophic kidneys.  BONES: Spinal degenerative changes.    IMPRESSION:     Multifocal pneumonia.    Bilateral pleural effusions.    Cirrhosis.    < from: Transthoracic Echocardiogram (02.15.18 @ 10:34) >  Patient name: FILI RAMOS  YOB: 1945   Age: 72 (M)   MR#: [id number]  Study Date: 2/15/2018  Location: [facility] Sonographer: Sarah Reno RAJEEV  Study quality: Technically good  Referring Physician: Gayathri Puckett MD  Blood Pressure: 124/56 mmHg  Height: 165 cm  Weight: 63 kg  BSA: 1.7 m2  ------------------------------------------------------------------------  PROCEDURE: Transthoracic echocardiogram with 2-D, M-Mode  and complete spectral and color flow Doppler.  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 Minimal mitral regurgitation. Mean transmitral  valve gradient equals 10 mm Hg, which is elevated even in  the setting of a prosthetic valve.  Aortic Root: Normal aortic root.  Aortic Valve: Calcified trileaflet aortic valve with normal  opening. Mild aortic regurgitation.  Left Atrium: Severely dilated left atrium.  LA volume index  = 154 cc/m2.  Left Ventricle: Normal left ventricular systolic function.  No segmental wall motion abnormalities. Normal left  ventricular internal dimensions and wall thicknesses.  Right Heart: Severe right atrial enlargement. Right  ventricular enlargement with decreased right ventricular  systolic function. Normal tricuspid valve.  Moderate  tricuspid regurgitation. Normal pulmonic valve. Minimal  pulmonic regurgitation.  Pericardium/PleuraNormal pericardium with no pericardial  effusion.  Hemodynamic: Estimated right ventricular systolic pressure  equals 69 mm Hg, assuming right atrial pressure equals 10  mm Hg, consistent with severe pulmonary hypertension.  ------------------------------------------------------------------------  CONCLUSIONS:  1. Mechanical prosthetic mitral valve replacement. Minimal  mitral regurgitation. Mean transmitral valve gradient  equals 10 mm Hg, which is elevated even in the setting of a  prosthetic valve.  2. Calcified trileaflet aortic valve with normal opening.  Mild aortic regurgitation.  3. Severely dilated left atrium.  LA volume index = 154  cc/m2.  4. Normal left ventricular internal dimensions and wall  thicknesses.  5. Normal left ventricular systolic function. No segmental  wall motion abnormalities.  6. Severe right atrial enlargement.  7. Right ventricular enlargement with decreased right  ventricular systolic function.  8. Estimated right ventricular systolic pressure equals 69  mm Hg, assuming right atrial pressure equals 10 mm Hg,  consistent with severe pulmonary hypertension.  Consider MATTHEW for further evaluation of the prosthetic  mitral valve, if clinically indicated.  ------------------------------------------------------------------------  Confirmed on  2/15/2018 - 13:19:36 by Kaleb Luis M.D.  ------------------------------------------------------------------------    < end of copied text >    MORGAN LIU M.D., RADIOLOGY RESIDENT  This document has been electronically signed.  CHARITO RODRIGUEZ M.D., ATTENDING RADIOLOGIST  This document has been electronically signed. Feb 10 2018  1:11PM

## 2018-02-18 NOTE — PROGRESS NOTE ADULT - ASSESSMENT
Admitted with multifocal PNA, started on Levaquin   Mechanical mitral valve with AFib on warfarin  Supratherapeutic INR without evidence of active bleeding   Allow INR to drift down.  No need for reversal with Vit K.  Anemia noted, no active bleeding (h/o ESRD/HD).  Caution that co-administration with abx such as Levaquin can increase INR/keeping it elevated.  Anticoagulation with warfarin may prove too high risk in this patient with underlying metabolic disorders (cirrhosis) and FTT (poor PO intake).  When INR becomes therapeutic again, will need to re-address goals of care and need for anticoagulation despite presence of mechanical mitral valve.  Will follow.

## 2018-02-19 LAB
APTT BLD: 41.6 SEC — HIGH (ref 27.5–37.4)
BLD GP AB SCN SERPL QL: NEGATIVE — SIGNIFICANT CHANGE UP
BUN SERPL-MCNC: 58 MG/DL — HIGH (ref 7–23)
CALCIUM SERPL-MCNC: 8.4 MG/DL — SIGNIFICANT CHANGE UP (ref 8.4–10.5)
CHLORIDE SERPL-SCNC: 92 MMOL/L — LOW (ref 98–107)
CO2 SERPL-SCNC: 23 MMOL/L — SIGNIFICANT CHANGE UP (ref 22–31)
CREAT SERPL-MCNC: 10.1 MG/DL — HIGH (ref 0.5–1.3)
GLUCOSE SERPL-MCNC: 86 MG/DL — SIGNIFICANT CHANGE UP (ref 70–99)
HCT VFR BLD CALC: 21.9 % — LOW (ref 39–50)
HGB BLD-MCNC: 6.9 G/DL — CRITICAL LOW (ref 13–17)
INR BLD: 2.11 — HIGH (ref 0.88–1.17)
MAGNESIUM SERPL-MCNC: 2.3 MG/DL — SIGNIFICANT CHANGE UP (ref 1.6–2.6)
MCHC RBC-ENTMCNC: 29.4 PG — SIGNIFICANT CHANGE UP (ref 27–34)
MCHC RBC-ENTMCNC: 31.5 % — LOW (ref 32–36)
MCV RBC AUTO: 93.2 FL — SIGNIFICANT CHANGE UP (ref 80–100)
NRBC # FLD: 0 — SIGNIFICANT CHANGE UP
PLATELET # BLD AUTO: 203 K/UL — SIGNIFICANT CHANGE UP (ref 150–400)
PMV BLD: 9.7 FL — SIGNIFICANT CHANGE UP (ref 7–13)
POTASSIUM SERPL-MCNC: 5 MMOL/L — SIGNIFICANT CHANGE UP (ref 3.5–5.3)
POTASSIUM SERPL-SCNC: 5 MMOL/L — SIGNIFICANT CHANGE UP (ref 3.5–5.3)
PROTHROM AB SERPL-ACNC: 24.6 SEC — HIGH (ref 9.8–13.1)
RBC # BLD: 2.35 M/UL — LOW (ref 4.2–5.8)
RBC # FLD: 15.2 % — HIGH (ref 10.3–14.5)
RH IG SCN BLD-IMP: POSITIVE — SIGNIFICANT CHANGE UP
SODIUM SERPL-SCNC: 132 MMOL/L — LOW (ref 135–145)
WBC # BLD: 4.67 K/UL — SIGNIFICANT CHANGE UP (ref 3.8–10.5)
WBC # FLD AUTO: 4.67 K/UL — SIGNIFICANT CHANGE UP (ref 3.8–10.5)

## 2018-02-19 PROCEDURE — 99232 SBSQ HOSP IP/OBS MODERATE 35: CPT

## 2018-02-19 PROCEDURE — 99233 SBSQ HOSP IP/OBS HIGH 50: CPT

## 2018-02-19 RX ORDER — WARFARIN SODIUM 2.5 MG/1
2 TABLET ORAL ONCE
Qty: 0 | Refills: 0 | Status: COMPLETED | OUTPATIENT
Start: 2018-02-19 | End: 2018-02-19

## 2018-02-19 RX ORDER — WARFARIN SODIUM 2.5 MG/1
2 TABLET ORAL ONCE
Qty: 0 | Refills: 0 | Status: DISCONTINUED | OUTPATIENT
Start: 2018-02-19 | End: 2018-02-19

## 2018-02-19 RX ADMIN — Medication 1 TABLET(S): at 17:07

## 2018-02-19 RX ADMIN — ERYTHROPOIETIN 4000 UNIT(S): 10000 INJECTION, SOLUTION INTRAVENOUS; SUBCUTANEOUS at 15:04

## 2018-02-19 RX ADMIN — Medication 1 TABLET(S): at 05:13

## 2018-02-19 RX ADMIN — WARFARIN SODIUM 2 MILLIGRAM(S): 2.5 TABLET ORAL at 17:07

## 2018-02-19 RX ADMIN — SEVELAMER CARBONATE 2400 MILLIGRAM(S): 2400 POWDER, FOR SUSPENSION ORAL at 08:36

## 2018-02-19 RX ADMIN — ATORVASTATIN CALCIUM 40 MILLIGRAM(S): 80 TABLET, FILM COATED ORAL at 22:54

## 2018-02-19 RX ADMIN — DONEPEZIL HYDROCHLORIDE 5 MILLIGRAM(S): 10 TABLET, FILM COATED ORAL at 22:54

## 2018-02-19 RX ADMIN — SEVELAMER CARBONATE 2400 MILLIGRAM(S): 2400 POWDER, FOR SUSPENSION ORAL at 17:07

## 2018-02-19 RX ADMIN — Medication 1 MILLIGRAM(S): at 08:36

## 2018-02-19 NOTE — PROGRESS NOTE ADULT - ASSESSMENT
Admitted with multifocal PNA, started on Levaquin   Mechanical mitral valve with AFib on warfarin  Supratherapeutic INR without evidence of active bleeding   Allow INR to drift down.  No need for reversal with Vit K.  Anemia noted, no active bleeding (h/o ESRD/HD).  Caution that co-administration with abx such as Levaquin can increase INR/keeping it elevated.  Anticoagulation with warfarin may prove too high risk in this patient with underlying metabolic disorders (cirrhosis) and FTT (poor PO intake).  When INR becomes therapeutic again, will need to re-address goals of care and need for anticoagulation despite presence of mechanical mitral valve.  Will follow. Admitted with multifocal PNA, started on Levaquin   Mechanical mitral valve with AFib on warfarin  Supratherapeutic INR without evidence of active bleeding   INR within therapeutic range again  Anemia noted, no active bleeding (h/o ESRD/HD).  Anticoagulation with warfarin may prove too high risk in this patient with underlying metabolic disorders (cirrhosis) and FTT (poor PO intake).   Would start with low dose warfarin (ie 1 or 2 mg to start to assess response), if consistent with goals of care.  Pt currently off abx and steroids.  Will follow.

## 2018-02-19 NOTE — PROGRESS NOTE ADULT - ASSESSMENT
72-year-old male with ERSD on HD TIW (MWF) admitted for influenza/PNA. Pt. had HD on 2/16. Plan for HD today.

## 2018-02-19 NOTE — PROGRESS NOTE ADULT - PROBLEM SELECTOR PLAN 1
Pt. with ESRD on HD TIW (MWF). Pt. had HD on 2/16,  tolerated it well without complications. Pt. clinically stable. Plan for maintenance HD today. Monitor BP and labs

## 2018-02-19 NOTE — PROGRESS NOTE ADULT - SUBJECTIVE AND OBJECTIVE BOX
[facility] DIVISION OF KIDNEY DISEASES AND HYPERTENSION --    HPI: 72-year-old male with PMH of HTN, MVR, ESRD on HD TIW (MWF) admitted for influenza/PNA. Pt. receives his outpatient HD treatments at [facility]. Pt. had HD on 2/16, tolerated dialysis well. Currently, pt. OOB to chair. Pt. says he wants to go home today. Pt. feels better and denies SOB, CP, HA or dizziness. Pt. scheduled for HD today.    PAST HISTORY  --------------------------------------------------------------------------------  No significant changes to PMH, PSH, FHx, SHx, unless otherwise noted    ALLERGIES & MEDICATIONS  --------------------------------------------------------------------------------  Allergies    No Known Allergies    Intolerances    Standing Inpatient Medications  atorvastatin 40 milliGRAM(s) Oral at bedtime  donepezil 5 milliGRAM(s) Oral at bedtime  epoetin angelica Injectable 4000 Unit(s) IV Push <User Schedule>  folic acid 1 milliGRAM(s) Oral daily  lactobacillus acidophilus 1 Tablet(s) Oral every 12 hours  metoprolol succinate ER 25 milliGRAM(s) Oral daily  sevelamer hydrochloride 2400 milliGRAM(s) Oral two times a day with meals  warfarin 2 milliGRAM(s) Oral once    REVIEW OF SYSTEMS  --------------------------------------------------------------------------------  General: no fever  CVS: no chest pain  RESP: no SOB  ABD: no abdominal pain  MSK: no edema     VITALS/PHYSICAL EXAM  --------------------------------------------------------------------------------  T(C): 36.8 (02-19-18 @ 05:12), Max: 36.9 (02-18-18 @ 21:53)  HR: 72 (02-19-18 @ 05:12) (72 - 82)  BP: 152/67 (02-19-18 @ 05:12) (152/67 - 157/78)  RR: 18 (02-19-18 @ 05:12) (18 - 18)  SpO2: 98% (02-19-18 @ 05:12) (98% - 100%)  Wt(kg): --    02-18-18 @ 07:01  -  02-19-18 @ 07:00  --------------------------------------------------------  IN: 1000 mL / OUT: 0 mL / NET: 1000 mL    02-19-18 @ 07:01  -  02-19-18 @ 11:21  --------------------------------------------------------  IN: 360 mL / OUT: 0 mL / NET: 360 mL    Physical Exam:  	Gen: NAD   	HEENT: No JVD   	Pulm: clear B/L  	CV: S1S2+   	Abd:  soft  	LE: No edema  	Neuro: awake   	Skin: Warm  	Vascular access: UE AVF: +thrill       LABS/STUDIES  --------------------------------------------------------------------------------              6.9    4.67  >-----------<  203      [02-19-18 @ 06:10]              21.9     132  |  92  |  58  ----------------------------<  86      [02-19-18 @ 06:10]  5.0   |  23  |  10.10        Ca     8.4     [02-19-18 @ 06:10]      Mg     2.3     [02-19-18 @ 06:10]    Creatinine Trend:  SCr 10.10 [02-19 @ 06:10]  SCr 8.18 [02-18 @ 06:30]  SCr 5.71 [02-17 @ 03:20]  SCr 8.50 [02-16 @ 04:00]  SCr 6.39 [02-15 @ 05:45]    HBsAg PRELIM POSITIVE REPEATED      [02-12-18 @ 10:33]

## 2018-02-19 NOTE — PROVIDER CONTACT NOTE (CRITICAL VALUE NOTIFICATION) - ACTION/TREATMENT ORDERED:
none at this time.
continue to monitor
pending intervention, will continue to monitor
continuing to assess
1 unit PRBC in HD
Continue to monitor pt.
no action ordered at this time

## 2018-02-19 NOTE — PROGRESS NOTE ADULT - SUBJECTIVE AND OBJECTIVE BOX
CHIEF COMPLAINT: Patient is a 72y old  male who presents with a chief complaint of Cough (10 Feb 2018 03:15)    SUBJECTIVE / OVERNIGHT EVENTS:    Patient reports feeling well. No new complaints Denies abdominal pain. Denies SOB. No other complaints. Wants to go home.     MEDICATIONS  (STANDING):  atorvastatin 40 milliGRAM(s) Oral at bedtime  donepezil 5 milliGRAM(s) Oral at bedtime  epoetin angelica Injectable 4000 Unit(s) IV Push <User Schedule>  folic acid 1 milliGRAM(s) Oral daily  lactobacillus acidophilus 1 Tablet(s) Oral every 12 hours  metoprolol succinate ER 25 milliGRAM(s) Oral daily  sevelamer hydrochloride 2400 milliGRAM(s) Oral two times a day with meals    MEDICATIONS  (PRN):  guaiFENesin   Syrup  (Sugar-Free) 200 milliGRAM(s) Oral every 6 hours PRN Cough    Vital Signs Last 24 Hrs  T(C): 36.8 (19 Feb 2018 05:12), Max: 36.9 (18 Feb 2018 21:53)  T(F): 98.3 (19 Feb 2018 05:12), Max: 98.4 (18 Feb 2018 21:53)  HR: 72 (19 Feb 2018 05:12) (72 - 82)  BP: 152/67 (19 Feb 2018 05:12) (152/67 - 157/78)  BP(mean): --  RR: 18 (19 Feb 2018 05:12) (18 - 18)  SpO2: 98% (19 Feb 2018 05:12) (98% - 100%)    PHYSICAL EXAM:  GENERAL: NAD, well-developed  HEAD:  Atraumatic, Normocephalic  EYES: EOMI  NECK: Supple, No JVD  CHEST/LUNG: Decreased bibasilar BS's   HEART: nl S1/S2  ABDOMEN: nondistended, soft  EXTREMITIES: RUE AVF clean, no active bleeding  SKIN: No rashes noted    LABS:                                   6.9    4.67  )-----------( 203      ( 19 Feb 2018 06:10 )             21.9   02-19    132<L>  |  92<L>  |  58<H>  ----------------------------<  86  5.0   |  23  |  10.10<H>    Ca    8.4      19 Feb 2018 06:10  Mg     2.3     02-19    PT/INR - ( 19 Feb 2018 06:10 )   PT: 24.6 SEC;   INR: 2.11          PTT - ( 19 Feb 2018 06:10 )  PTT:41.6 SEC  RADIOLOGY & ADDITIONAL TESTS:    Imaging Personally Reviewed:    [ ] Consultant(s) Notes Reviewed:  [ ] Care Discussed with Consultants/Other Providers: Tele PA

## 2018-02-19 NOTE — PROGRESS NOTE ADULT - PROBLEM SELECTOR PLAN 3
Pt. with low hemoglobin level. Pt. on CHRISTEN during HD. Plan for blood transfusion today (ordered by primary medical team). Monitor hemoglobin

## 2018-02-19 NOTE — PROVIDER CONTACT NOTE (CRITICAL VALUE NOTIFICATION) - PERSON GIVING RESULT:
Chuy Cao/ hematology
[facility]/ Hematology Lab
Jerson Choi/Lab
hematology
hematology Home Zel[facility]
katelynn carver, hematology
tay WHITTAKER
Lab
NEGRO Molina

## 2018-02-19 NOTE — PROGRESS NOTE ADULT - SUBJECTIVE AND OBJECTIVE BOX
Cardiology/Vascular Medicine Inpatient Progress Note    No new events/complaints.  Remains lethargic.  Telemetry: Afib 60s-70s bpm  INR: 2.11       Vital Signs Last 24 Hrs  T(C): 36.8 (19 Feb 2018 05:12), Max: 36.9 (18 Feb 2018 21:53)  T(F): 98.3 (19 Feb 2018 05:12), Max: 98.4 (18 Feb 2018 21:53)  HR: 72 (19 Feb 2018 05:12) (72 - 82)  BP: 152/67 (19 Feb 2018 05:12) (152/67 - 157/78)  BP(mean): --  RR: 18 (19 Feb 2018 05:12) (18 - 18)  SpO2: 98% (19 Feb 2018 05:12) (98% - 100%)    Appearance: Elderly man, NAD  HEENT:   Normal oral mucosa, PERRL, EOMI	  Lymphatic: No lymphadenopathy  Cardiovascular: Irreg irreg S1 S2, No JVD, +click  Respiratory: Decreased BS b/l  Psychiatry: awake, able to answer questions  Gastrointestinal:  Soft, Non-tender, + BS	  Skin: +ecchymoses  Neurologic: Non-focal  Extremities: Normal range of motion, No clubbing, cyanosis or edema  Vascular: Peripheral pulses palpable 2+ bilaterally    MEDICATIONS  (STANDING):  atorvastatin 40 milliGRAM(s) Oral at bedtime  donepezil 5 milliGRAM(s) Oral at bedtime  epoetin angelica Injectable 4000 Unit(s) IV Push <User Schedule>  folic acid 1 milliGRAM(s) Oral daily  lactobacillus acidophilus 1 Tablet(s) Oral every 12 hours  metoprolol succinate ER 25 milliGRAM(s) Oral daily  sevelamer hydrochloride 2400 milliGRAM(s) Oral two times a day with meals    MEDICATIONS  (PRN):  guaiFENesin   Syrup  (Sugar-Free) 200 milliGRAM(s) Oral every 6 hours PRN Cough        LABS:	                                           6.9    4.67  )-----------( 203      ( 19 Feb 2018 06:10 )             21.9   02-19    132<L>  |  92<L>  |  58<H>  ----------------------------<  86  5.0   |  23  |  10.10<H>    Ca    8.4      19 Feb 2018 06:10  Mg     2.3     02-19    PT/INR - ( 19 Feb 2018 06:10 )   PT: 24.6 SEC;   INR: 2.11     PTT - ( 19 Feb 2018 06:10 )  PTT:41.6 SEC        < from: CT Chest No Cont (02.10.18 @ 11:09) >    EXAM:  CT CHEST        PROCEDURE DATE:  Feb 10 2018         INTERPRETATION:  CLINICAL INFORMATION: Cough and fever    COMPARISON: CT abdomen from 7/1/2014    PROCEDURE:   CT of the Chest was performed without intravenous contrast.  Sagittal and coronal reformats were performed.      FINDINGS:    CHEST:     LUNGS AND LARGE AIRWAYS: Patent central airways.  Patchy airspace   consolidations most prominent in the right upper lobe and bilateral lower   lobes and to a lesser extent within the posterior left upper lobe   consistent with multifocal pneumonia.  PLEURA: Small to moderate right and small left pleural effusions.  VESSELS: Atherosclerotic changes of the aorta and coronary arteries.  HEART: Cardiomegaly with marked biatrial enlargement.CABG. Mitral valve   replacement.  No pericardial effusion.   MEDIASTINUM AND CHELE: No lymphadenopathy.  CHEST WALL AND LOWER NECK: Sternotomy.  VISUALIZED UPPER ABDOMEN: Cirrhosis. A few scattered hepatic cysts.   Atrophic kidneys.  BONES: Spinal degenerative changes.    IMPRESSION:     Multifocal pneumonia.    Bilateral pleural effusions.    Cirrhosis.    < from: Transthoracic Echocardiogram (02.15.18 @ 10:34) >  Patient name: FILI RAMOS  YOB: 1945   Age: 72 (M)   MR#: [id number]  Study Date: 2/15/2018  Location: [facility] Sonographer: Sarah Reno RDCS  Study quality: Technically good  Referring Physician: Gayathri Puckett MD  Blood Pressure: 124/56 mmHg  Height: 165 cm  Weight: 63 kg  BSA: 1.7 m2  ------------------------------------------------------------------------  PROCEDURE: Transthoracic echocardiogram with 2-D, M-Mode  and complete spectral and color flow Doppler.  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 Minimal mitral regurgitation. Mean transmitral  valve gradient equals 10 mm Hg, which is elevated even in  the setting of a prosthetic valve.  Aortic Root: Normal aortic root.  Aortic Valve: Calcified trileaflet aortic valve with normal  opening. Mild aortic regurgitation.  Left Atrium: Severely dilated left atrium.  LA volume index  = 154 cc/m2.  Left Ventricle: Normal left ventricular systolic function.  No segmental wall motion abnormalities. Normal left  ventricular internal dimensions and wall thicknesses.  Right Heart: Severe right atrial enlargement. Right  ventricular enlargement with decreased right ventricular  systolic function. Normal tricuspid valve.  Moderate  tricuspid regurgitation. Normal pulmonic valve. Minimal  pulmonic regurgitation.  Pericardium/PleuraNormal pericardium with no pericardial  effusion.  Hemodynamic: Estimated right ventricular systolic pressure  equals 69 mm Hg, assuming right atrial pressure equals 10  mm Hg, consistent with severe pulmonary hypertension.  ------------------------------------------------------------------------  CONCLUSIONS:  1. Mechanical prosthetic mitral valve replacement. Minimal  mitral regurgitation. Mean transmitral valve gradient  equals 10 mm Hg, which is elevated even in the setting of a  prosthetic valve.  2. Calcified trileaflet aortic valve with normal opening.  Mild aortic regurgitation.  3. Severely dilated left atrium.  LA volume index = 154  cc/m2.  4. Normal left ventricular internal dimensions and wall  thicknesses.  5. Normal left ventricular systolic function. No segmental  wall motion abnormalities.  6. Severe right atrial enlargement.  7. Right ventricular enlargement with decreased right  ventricular systolic function.  8. Estimated right ventricular systolic pressure equals 69  mm Hg, assuming right atrial pressure equals 10 mm Hg,  consistent with severe pulmonary hypertension.  Consider MATTHEW for further evaluation of the prosthetic  mitral valve, if clinically indicated.  ------------------------------------------------------------------------  Confirmed on  2/15/2018 - 13:19:36 by Kaleb Luis M.D.  ------------------------------------------------------------------------    < end of copied text >    MORGAN LIU M.D., RADIOLOGY RESIDENT  This document has been electronically signed.  CHARITO RODRIGUEZ M.D., ATTENDING RADIOLOGIST  This document has been electronically signed. Feb 10 2018  1:11PM Cardiology/Vascular Medicine Inpatient Progress Note    No new events/complaints.  Remains lethargic.  Telemetry: Afib 60s-70s bpm  INR: 2.11     Can resume warfarin if consistent with goals of care.  Would start with low dose warfarin (ie 1-2 mg)  Now off abx, steroids.    Vital Signs Last 24 Hrs  T(C): 36.8 (19 Feb 2018 05:12), Max: 36.9 (18 Feb 2018 21:53)  T(F): 98.3 (19 Feb 2018 05:12), Max: 98.4 (18 Feb 2018 21:53)  HR: 72 (19 Feb 2018 05:12) (72 - 82)  BP: 152/67 (19 Feb 2018 05:12) (152/67 - 157/78)  BP(mean): --  RR: 18 (19 Feb 2018 05:12) (18 - 18)  SpO2: 98% (19 Feb 2018 05:12) (98% - 100%)    Appearance: Elderly man, NAD  HEENT:   Normal oral mucosa, PERRL, EOMI	  Lymphatic: No lymphadenopathy  Cardiovascular: Irreg irreg S1 S2, No JVD, +click  Respiratory: Decreased BS b/l  Psychiatry: awake, able to answer questions  Gastrointestinal:  Soft, Non-tender, + BS	  Skin: +ecchymoses  Neurologic: Non-focal  Extremities: Normal range of motion, No clubbing, cyanosis or edema  Vascular: Peripheral pulses palpable 2+ bilaterally    MEDICATIONS  (STANDING):  atorvastatin 40 milliGRAM(s) Oral at bedtime  donepezil 5 milliGRAM(s) Oral at bedtime  epoetin angelica Injectable 4000 Unit(s) IV Push <User Schedule>  folic acid 1 milliGRAM(s) Oral daily  lactobacillus acidophilus 1 Tablet(s) Oral every 12 hours  metoprolol succinate ER 25 milliGRAM(s) Oral daily  sevelamer hydrochloride 2400 milliGRAM(s) Oral two times a day with meals    MEDICATIONS  (PRN):  guaiFENesin   Syrup  (Sugar-Free) 200 milliGRAM(s) Oral every 6 hours PRN Cough        LABS:	                                           6.9    4.67  )-----------( 203      ( 19 Feb 2018 06:10 )             21.9   02-19    132<L>  |  92<L>  |  58<H>  ----------------------------<  86  5.0   |  23  |  10.10<H>    Ca    8.4      19 Feb 2018 06:10  Mg     2.3     02-19    PT/INR - ( 19 Feb 2018 06:10 )   PT: 24.6 SEC;   INR: 2.11     PTT - ( 19 Feb 2018 06:10 )  PTT:41.6 SEC        < from: CT Chest No Cont (02.10.18 @ 11:09) >    EXAM:  CT CHEST        PROCEDURE DATE:  Feb 10 2018         INTERPRETATION:  CLINICAL INFORMATION: Cough and fever    COMPARISON: CT abdomen from 7/1/2014    PROCEDURE:   CT of the Chest was performed without intravenous contrast.  Sagittal and coronal reformats were performed.      FINDINGS:    CHEST:     LUNGS AND LARGE AIRWAYS: Patent central airways.  Patchy airspace   consolidations most prominent in the right upper lobe and bilateral lower   lobes and to a lesser extent within the posterior left upper lobe   consistent with multifocal pneumonia.  PLEURA: Small to moderate right and small left pleural effusions.  VESSELS: Atherosclerotic changes of the aorta and coronary arteries.  HEART: Cardiomegaly with marked biatrial enlargement.CABG. Mitral valve   replacement.  No pericardial effusion.   MEDIASTINUM AND CHELE: No lymphadenopathy.  CHEST WALL AND LOWER NECK: Sternotomy.  VISUALIZED UPPER ABDOMEN: Cirrhosis. A few scattered hepatic cysts.   Atrophic kidneys.  BONES: Spinal degenerative changes.    IMPRESSION:     Multifocal pneumonia.    Bilateral pleural effusions.    Cirrhosis.    < from: Transthoracic Echocardiogram (02.15.18 @ 10:34) >  Patient name: FILI RAMOS  YOB: 1945   Age: 72 (M)   MR#: [id number]  Study Date: 2/15/2018  Location: [facility] Sonographer: Sarah Reno RAJEEV  Study quality: Technically good  Referring Physician: Gayathri Puckett MD  Blood Pressure: 124/56 mmHg  Height: 165 cm  Weight: 63 kg  BSA: 1.7 m2  ------------------------------------------------------------------------  PROCEDURE: Transthoracic echocardiogram with 2-D, M-Mode  and complete spectral and color flow Doppler.  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 Minimal mitral regurgitation. Mean transmitral  valve gradient equals 10 mm Hg, which is elevated even in  the setting of a prosthetic valve.  Aortic Root: Normal aortic root.  Aortic Valve: Calcified trileaflet aortic valve with normal  opening. Mild aortic regurgitation.  Left Atrium: Severely dilated left atrium.  LA volume index  = 154 cc/m2.  Left Ventricle: Normal left ventricular systolic function.  No segmental wall motion abnormalities. Normal left  ventricular internal dimensions and wall thicknesses.  Right Heart: Severe right atrial enlargement. Right  ventricular enlargement with decreased right ventricular  systolic function. Normal tricuspid valve.  Moderate  tricuspid regurgitation. Normal pulmonic valve. Minimal  pulmonic regurgitation.  Pericardium/PleuraNormal pericardium with no pericardial  effusion.  Hemodynamic: Estimated right ventricular systolic pressure  equals 69 mm Hg, assuming right atrial pressure equals 10  mm Hg, consistent with severe pulmonary hypertension.  ------------------------------------------------------------------------  CONCLUSIONS:  1. Mechanical prosthetic mitral valve replacement. Minimal  mitral regurgitation. Mean transmitral valve gradient  equals 10 mm Hg, which is elevated even in the setting of a  prosthetic valve.  2. Calcified trileaflet aortic valve with normal opening.  Mild aortic regurgitation.  3. Severely dilated left atrium.  LA volume index = 154  cc/m2.  4. Normal left ventricular internal dimensions and wall  thicknesses.  5. Normal left ventricular systolic function. No segmental  wall motion abnormalities.  6. Severe right atrial enlargement.  7. Right ventricular enlargement with decreased right  ventricular systolic function.  8. Estimated right ventricular systolic pressure equals 69  mm Hg, assuming right atrial pressure equals 10 mm Hg,  consistent with severe pulmonary hypertension.  Consider MATTHEW for further evaluation of the prosthetic  mitral valve, if clinically indicated.  ------------------------------------------------------------------------  Confirmed on  2/15/2018 - 13:19:36 by Kaleb Luis M.D.  ------------------------------------------------------------------------    < end of copied text >    MORGAN LIU M.D., RADIOLOGY RESIDENT  This document has been electronically signed.  CHARITO RODRIGUEZ M.D., ATTENDING RADIOLOGIST  This document has been electronically signed. Feb 10 2018  1:11PM

## 2018-02-19 NOTE — PROVIDER CONTACT NOTE (CRITICAL VALUE NOTIFICATION) - RECOMMENDATIONS
Vitamin K. Continue to monitor.
1 unit PRBC in HD
recheck with AM labs
Will continue to monitor daily labs.

## 2018-02-19 NOTE — PROGRESS NOTE ADULT - ASSESSMENT
72M with PMH of ESRD on HD (MWF- full session) via LUE AVF, afib, mechanical mitral valve repair (on warfarin) p/w cough x 3 weeks admitted for Flu s/p Tamiflu c/b afib w/ RVR and supratherapeutic INR.     *Supratherapeutic INR: on coumadin for mechanical MVR with a fib  - encourage intake of foods containing vit K  - cardiology consulted, appreciate recommendations. P  - INR now slightly sub-therapeutic at 2.11; given mechanical valve, will dose coumadin 2mg tonight  - Monitor INR as outpatient  - No active signs of bleeding    *Anemia: Likely anemia 2/2 CKD; no active bleeding identified   - Hgb 6.9 this AM  - B12/folate WNL  - Iron panel shows mixed picture of AOCD but likely partial BEAR  - CHRISTEN with HD per renal  - active T&S  - Plan for 1 unit of PRBC today with HD    *Multilobar pneumonia: suspect gram negative pneumonia, also possible staph aureus pneumonia given recent influenza. RVP positive for Flu  - clinically improved from pneumonia  - S/p 7 days of antibiotics  - No clinical infection at this time  - Marissa WV'ed    - CT chest with bilateral effusions  - appreciate pulmonary input - doubt infected pleural space, monitor off antibiotics for now per conversation     *Influenza: completed 5 day course of Tamiflu    *A fib with RVR: RVR likely 2/2 infection  - management of infection as above  - cont tele  - HR now controlled  - cont toprol XL 25 mg daily    *Mechanical MVR - also has A fib  - INR goal 2.5 - 3.5  - INR subtherapeutic  - Needs outpatient following up for coumadin dosing  - monitor INR daily  - Appreciate cards recs; pt is high risk for bleeding 2/2 to supratherapeutic INR due to poor vit K intake and advanced illness. Attempted to contact family (son and wife listed in chart) to discuss anti-coagulation as well as GOC given pt does not seem to understand the benefits vs. risks of continued anticoagulation. I am unable to reach family at this time to discuss safe discharge or follow up.     *ESRD on HD:  - renal consult appreciated  - HD planned for today, can be discharged home following if safe discharge can be ensured    *Cirrhosis: seen incidentally on CT chest  - bilirubin normal, albumin slightly decreased in setting of infection  - will corroborate history with patient/family; unable to reach    *HTN: continue home meds  - monitor BP closely    *Hyponatremia: likely in setting of ESRD  - monitor    *FEN/GI: renal diet    *Ppx: on coumadin    *Dispo: Discussed case with SW. Patient medically stable for discharge following PRBC transfusion and HD however I am unable to ensure safe discharge given inability to contact family to discuss AC and lack of known follow up for coumadin dosing    DISCHARGE- 36 minutes spent on discharge planning

## 2018-02-19 NOTE — PROVIDER CONTACT NOTE (CRITICAL VALUE NOTIFICATION) - SITUATION
INR = 7.12
h/h low
Critical Value Notification: Pt:87.1 INR:7.53
Hbg 7
INR 7.38
PT 75.4, INR 6.54
INR-5.92 down from 7.12. No active bleeding noted.

## 2018-02-20 ENCOUNTER — TRANSCRIPTION ENCOUNTER (OUTPATIENT)
Age: 73
End: 2018-02-20

## 2018-02-20 LAB
APTT BLD: 42.7 SEC — HIGH (ref 27.5–37.4)
BUN SERPL-MCNC: 34 MG/DL — HIGH (ref 7–23)
CALCIUM SERPL-MCNC: 8.3 MG/DL — LOW (ref 8.4–10.5)
CHLORIDE SERPL-SCNC: 91 MMOL/L — LOW (ref 98–107)
CO2 SERPL-SCNC: 27 MMOL/L — SIGNIFICANT CHANGE UP (ref 22–31)
CREAT SERPL-MCNC: 7.16 MG/DL — HIGH (ref 0.5–1.3)
GLUCOSE SERPL-MCNC: 80 MG/DL — SIGNIFICANT CHANGE UP (ref 70–99)
HCT VFR BLD CALC: 25.2 % — LOW (ref 39–50)
HGB BLD-MCNC: 8.4 G/DL — LOW (ref 13–17)
INR BLD: 2.06 — HIGH (ref 0.88–1.17)
MAGNESIUM SERPL-MCNC: 2.2 MG/DL — SIGNIFICANT CHANGE UP (ref 1.6–2.6)
MCHC RBC-ENTMCNC: 30.7 PG — SIGNIFICANT CHANGE UP (ref 27–34)
MCHC RBC-ENTMCNC: 33.3 % — SIGNIFICANT CHANGE UP (ref 32–36)
MCV RBC AUTO: 92 FL — SIGNIFICANT CHANGE UP (ref 80–100)
NRBC # FLD: 0 — SIGNIFICANT CHANGE UP
PLATELET # BLD AUTO: 221 K/UL — SIGNIFICANT CHANGE UP (ref 150–400)
PMV BLD: 9.6 FL — SIGNIFICANT CHANGE UP (ref 7–13)
POTASSIUM SERPL-MCNC: 4.5 MMOL/L — SIGNIFICANT CHANGE UP (ref 3.5–5.3)
POTASSIUM SERPL-SCNC: 4.5 MMOL/L — SIGNIFICANT CHANGE UP (ref 3.5–5.3)
PROTHROM AB SERPL-ACNC: 23.2 SEC — HIGH (ref 9.8–13.1)
RBC # BLD: 2.74 M/UL — LOW (ref 4.2–5.8)
RBC # FLD: 15.5 % — HIGH (ref 10.3–14.5)
SODIUM SERPL-SCNC: 133 MMOL/L — LOW (ref 135–145)
WBC # BLD: 4.41 K/UL — SIGNIFICANT CHANGE UP (ref 3.8–10.5)
WBC # FLD AUTO: 4.41 K/UL — SIGNIFICANT CHANGE UP (ref 3.8–10.5)

## 2018-02-20 PROCEDURE — 99232 SBSQ HOSP IP/OBS MODERATE 35: CPT

## 2018-02-20 RX ORDER — LACTOBACILLUS ACIDOPHILUS 100MM CELL
0 CAPSULE ORAL
Qty: 0 | Refills: 0 | COMMUNITY
Start: 2018-02-20

## 2018-02-20 RX ORDER — ERYTHROPOIETIN 10000 [IU]/ML
4000 INJECTION, SOLUTION INTRAVENOUS; SUBCUTANEOUS
Qty: 0 | Refills: 0 | COMMUNITY
Start: 2018-02-20

## 2018-02-20 RX ORDER — WARFARIN SODIUM 2.5 MG/1
3 TABLET ORAL ONCE
Qty: 0 | Refills: 0 | Status: COMPLETED | OUTPATIENT
Start: 2018-02-20 | End: 2018-02-20

## 2018-02-20 RX ORDER — WARFARIN SODIUM 2.5 MG/1
2 TABLET ORAL ONCE
Qty: 0 | Refills: 0 | Status: DISCONTINUED | OUTPATIENT
Start: 2018-02-20 | End: 2018-02-20

## 2018-02-20 RX ADMIN — DONEPEZIL HYDROCHLORIDE 5 MILLIGRAM(S): 10 TABLET, FILM COATED ORAL at 23:20

## 2018-02-20 RX ADMIN — SEVELAMER CARBONATE 2400 MILLIGRAM(S): 2400 POWDER, FOR SUSPENSION ORAL at 17:39

## 2018-02-20 RX ADMIN — WARFARIN SODIUM 3 MILLIGRAM(S): 2.5 TABLET ORAL at 17:39

## 2018-02-20 RX ADMIN — Medication 1 MILLIGRAM(S): at 13:13

## 2018-02-20 RX ADMIN — ATORVASTATIN CALCIUM 40 MILLIGRAM(S): 80 TABLET, FILM COATED ORAL at 23:20

## 2018-02-20 RX ADMIN — Medication 1 TABLET(S): at 17:39

## 2018-02-20 RX ADMIN — Medication 25 MILLIGRAM(S): at 05:02

## 2018-02-20 RX ADMIN — Medication 1 TABLET(S): at 05:02

## 2018-02-20 RX ADMIN — SEVELAMER CARBONATE 2400 MILLIGRAM(S): 2400 POWDER, FOR SUSPENSION ORAL at 09:25

## 2018-02-20 NOTE — DISCHARGE NOTE ADULT - HOSPITAL COURSE
72M with PMHx of ESRD on HD (MWF- full session) via LUE AVF, afib, mechanical mitral valve repair (on warfarin) p/w cough x 3 weeks.   Reports having a recent hospitalization last month for a bleeding fistula at OSH.   Patient has had a productive cough x3 weeks since last hospitalization. He became SOB today, so his family called EMS to bring to the ED per triage note. Patient wasn't sure why his family sent him to the hospital.   Denies any F/C, N/V, CP, headache, LE edema, or abdominal pain.   He had his regular dialysis this morning.     In ED,   Tmax 101, HR , /72, RR 18, SpO2 99 on RA   Gave 1mg coumadin, Linezolid, Vanc and Zosyn, tylenol, 15mg IVP diltiazem, 25mg benadryl   Had episode of afib w/ RVR in ED , improved s/p diltiazem    *Supratherapeutic INR: on coumadin for mechanical MVR with a fib  - encourage intake of foods containing vit K  - cardiology consulted: pt is high risk for bleeding 2/2 to supratherapeutic INR due to poor vit K intake and advanced illness. INR drifted down without vit K administration   - Monitor INR as outpatient  - No active signs of bleeding    *Anemia: Likely anemia 2/2 CKD; no active bleeding identified   - B12/folate WNL  - Iron panel shows mixed picture of AOCD but likely partial BEAR  - CHRISTEN with HD per renal  - active T&S  - s/p 1 unit of PRBC today with HD yesterday    *Multilobar pneumonia: suspect gram negative pneumonia, also possible staph aureus pneumonia given recent influenza. RVP positive for Flu  - clinically improved from pneumonia  - S/p 7 days of antibiotics  - No clinical infection at this time  - Marissa MS'ed    - CT chest with bilateral effusions  - appreciate pulmonary input - doubt infected pleural space, monitor off antibiotics for now per conversation     *Influenza: completed 5 day course of Tamiflu    *A fib with RVR: RVR likely 2/2 infection  - management of infection as above  - cont tele  - HR now controlled  - cont toprol XL 25 mg daily    *Mechanical MVR - also has A fib  - INR goal 2.5 - 3.5  - INR subtherapeutic  - Needs outpatient following up for coumadin dosing  - monitor INR daily  - Appreciate cards recs; pt is high risk for bleeding 2/2 to supratherapeutic INR due to poor vit K intake and advanced illness. Attempted to contact family (son and wife listed in chart) to discuss anti-coagulation as well as GOC given pt does not seem to understand the benefits vs. risks of continued anticoagulation. I am unable to reach family at this time to discuss safe discharge or follow up.     *ESRD on HD:  - renal consult appreciated  - HD planned for today, can be discharged home following if safe discharge can be ensured    *Cirrhosis: seen incidentally on CT chest  - bilirubin normal, albumin slightly decreased in setting of infection  - will corroborate history with patient/family; unable to reach    *HTN: continue home meds  - monitor BP closely    *Hyponatremia: likely in setting of ESRD 72M with PMHx of ESRD on HD (MWF- full session) via LUE AVF, afib, mechanical mitral valve repair (on warfarin) p/w cough x 3 weeks.   Reports having a recent hospitalization last month for a bleeding fistula at OSH.   Patient has had a productive cough x3 weeks since last hospitalization. He became SOB today, so his family called EMS to bring to the ED per triage note. Patient wasn't sure why his family sent him to the hospital.   Denies any F/C, N/V, CP, headache, LE edema, or abdominal pain.   He had his regular dialysis this morning.     In ED,   Tmax 101, HR , /72, RR 18, SpO2 99 on RA   Gave 1mg coumadin, Linezolid, Vanc and Zosyn, tylenol, 15mg IVP diltiazem, 25mg benadryl   Had episode of afib w/ RVR in ED , improved s/p diltiazem    *Supratherapeutic INR: on coumadin for mechanical MVR with a fib  - encourage intake of foods containing vit K  - cardiology consulted: pt is high risk for bleeding 2/2 to supratherapeutic INR due to poor vit K intake and advanced illness. INR drifted down without vit K administration   - Monitor INR as outpatient  - No active signs of bleeding    *Anemia: Likely anemia 2/2 CKD; no active bleeding identified   - B12/folate WNL  - Iron panel shows mixed picture of AOCD but likely partial BEAR  - CHRISTEN with HD per renal  - active T&S  - s/p 1 unit of PRBC today with HD yesterday    *Multilobar pneumonia: suspect gram negative pneumonia, also possible staph aureus pneumonia given recent influenza. RVP positive for Flu  - clinically improved from pneumonia  - S/p 7 days of antibiotics  - No clinical infection at this time  - Marissa PR'ed    - CT chest with bilateral effusions  - appreciate pulmonary input - doubt infected pleural space, monitor off antibiotics for now per conversation     *Influenza: completed 5 day course of Tamiflu    *A fib with RVR: RVR likely 2/2 infection  - management of infection as above  - cont tele  - HR now controlled  - cont toprol XL 25 mg daily    *Mechanical MVR - also has A fib  - INR goal 2.5 - 3.5  - INR subtherapeutic  - Needs outpatient following up for coumadin dosing  - monitor INR daily  - Appreciate cards recs; pt is high risk for bleeding 2/2 to supratherapeutic INR due to poor vit K intake and advanced illness. Attempted to contact family (son and wife listed in chart) to discuss anti-coagulation as well as GOC given pt does not seem to understand the benefits vs. risks of continued anticoagulation. I am unable to reach family at this time to discuss safe discharge or follow up.     *ESRD on HD:  - renal consult appreciated  - HD planned for today, can be discharged home following if safe discharge can be ensured    *Cirrhosis: seen incidentally on CT chest  - bilirubin normal, albumin slightly decreased in setting of infection  - will corroborate history with patient/family; unable to reach    *HTN: continue home meds  - monitor BP closely    *Hyponatremia: likely in setting of ESRD    2/21/18 Pt is medically stable for discharge home today as per Dr. Galo. 73 y/o M with PMHx of ESRD on HD (MWF- full session) via LUE AVF, afib, mechanical mitral valve repair (on warfarin) p/w cough x 3 weeks.   Reports having a recent hospitalization last month for a bleeding fistula at OSH.   Patient has had a productive cough x3 weeks since last hospitalization. He became SOB today, so his family called EMS to bring to the ED per triage note. Patient wasn't sure why his family sent him to the hospital.   Denies any F/C, N/V, CP, headache, LE edema, or abdominal pain.   He had his regular dialysis this morning.     In ED,   Tmax 101, HR , /72, RR 18, SpO2 99 on RA   Gave 1mg coumadin, Linezolid, Vanc and Zosyn, tylenol, 15mg IVP diltiazem, 25mg benadryl   Had episode of afib w/ RVR in ED , improved s/p diltiazem    *Supratherapeutic INR: on coumadin for mechanical MVR with a fib  - encourage intake of foods containing vit K  - cardiology consulted: pt is high risk for bleeding 2/2 to supratherapeutic INR due to poor vit K intake and advanced illness. INR drifted down without vit K administration   - Monitor INR as outpatient  - No active signs of bleeding    *Anemia: Likely anemia 2/2 CKD; no active bleeding identified   - B12/folate WNL  - Iron panel shows mixed picture of AOCD but likely partial BEAR  - CHRISTEN with HD per renal  - active T&S  - s/p 1 unit of PRBC today with HD yesterday    *Multilobar pneumonia: suspect gram negative pneumonia, also possible staph aureus pneumonia given recent influenza. RVP positive for Flu  - clinically improved from pneumonia  - S/p 7 days of antibiotics  - No clinical infection at this time  - Marissa WA'ed    - CT chest with bilateral effusions  - appreciate pulmonary input - doubt infected pleural space, monitor off antibiotics for now per conversation     *Influenza: completed 5 day course of Tamiflu    *A fib with RVR: RVR likely 2/2 infection  - management of infection as above  - cont tele  - HR now controlled  - cont toprol XL 25 mg daily    *Mechanical MVR - also has A fib  - INR goal 2.5 - 3.5  - INR subtherapeutic  - Needs outpatient following up for coumadin dosing  - monitor INR daily  - Appreciate cards recs; pt is high risk for bleeding 2/2 to supratherapeutic INR due to poor vit K intake and advanced illness. Attempted to contact family (son and wife listed in chart) to discuss anti-coagulation as well as GOC given pt does not seem to understand the benefits vs. risks of continued anticoagulation. I am unable to reach family at this time to discuss safe discharge or follow up.     *ESRD on HD:  - renal consult appreciated  - HD planned for today, can be discharged home following if safe discharge can be ensured    *Cirrhosis: seen incidentally on CT chest  - bilirubin normal, albumin slightly decreased in setting of infection  - will corroborate history with patient/family; unable to reach    *HTN: continue home meds  - monitor BP closely    *Hyponatremia: likely in setting of ESRD    2/21/18 Pt is medically stable for discharge home today as per Dr. Galo. To follow-up INR on Friday at Dialysis. F/U with Primary Care Doctor and Cardiologist.

## 2018-02-20 NOTE — DISCHARGE NOTE ADULT - CARE PROVIDER_API CALL
Dr. Falk,   Phone: (   )    -  Fax: (   )    - Branden Garcia (MD; PhD), Cardiology; Internal Medicine; Vascular Medicine  [address] [place] [address] [address] [place] [id number]  Phone: [phone]  Fax: [phone]

## 2018-02-20 NOTE — DIETITIAN INITIAL EVALUATION ADULT. - OTHER INFO
Pt seen for LOS, 73 Y/O male admitted with the DX of ESRD on HD, Anemia, hyponatremia, reports fair appetite , denies any N/V/D at this  time, Pt not sure about weight loss, appears lighter than the stated weight. Pt reports seeing dietitian at the dietitian center. labs reviewed,  Current diet remains appropriate, Based on Nutrtion assessment pt meets the criteria for moderate malnutrition, Recommend to add Nepro 1 can po 2 x daily to increase po. Nutrition education provided. RD remains available.

## 2018-02-20 NOTE — DIETITIAN INITIAL EVALUATION ADULT. - PROBLEM SELECTOR PLAN 1
- found to have influenza   - has new cough with congestion seen on CXR, pna vs pulm edema   - check CT chest   - hold abx, start tamiflu 30mg now then after HD for total of 5 days    - f/u blood and sputum cultures   - RVP+ for influenza   - fall and aspiration precautions  - isolation

## 2018-02-20 NOTE — DISCHARGE NOTE ADULT - PLAN OF CARE
Prevent complications You completed your course of antibiotics. Follow up with your primary care physician for further monitoring in 1-2 weeks. Please call to arrange appointment. Continue Coumadin. Take 3mg daily. Please have your INR checked during dialysis and follow up with your primary care doctor and cardiologist. Continue with dialysis according to your regular schedule. Continue supplements as prescribed, follow up with your nephrologist regularly for monitoring. Continue metoprolol. Follow up with your primary care physician for further monitoring in 1-2 weeks. Please call to arrange appointment. Please follow up with GI regarding colonoscopy. Rate control Continue to monitor. Reduce blood pressure. Continue Coumadin. Take 3mg daily. Please have your INR checked during dialysis on Friday and follow up with your primary care doctor and cardiologist.

## 2018-02-20 NOTE — PROGRESS NOTE ADULT - ASSESSMENT
Admitted with multifocal PNA, started on Levaquin   Mechanical mitral valve with AFib on warfarin  Supratherapeutic INR without evidence of active bleeding   INR within therapeutic range again  Anemia noted, no active bleeding (h/o ESRD/HD).  Family/patient opting to continue with anticoagulation with warfarin.  Can f/u with our office or PMD's office for INR check.  D/C ok from our perspective.  D/w Dr. Galo.

## 2018-02-20 NOTE — PROGRESS NOTE ADULT - ASSESSMENT
72M with PMH of ESRD on HD (MWF- full session) via LUE AVF, afib, mechanical mitral valve repair (on warfarin) p/w cough x 3 weeks admitted for Flu s/p Tamiflu c/b afib w/ RVR and supratherapeutic INR.     *Supratherapeutic INR: on coumadin for mechanical MVR with a fib  - INR has decreased - today slightly subtherapeutic  - discussed case with cardiology preet Conner for discharge  - continue coumadin - increase dose slight to 3 mg daily  - INR check at HD as outpatient - discussed with wife Joanne    *Anemia: Likely anemia 2/2 CKD; no active bleeding identified   - has received multiple PRBC transfusions as inpatient  - today hgb is 8.5 - at goal  - discussed with wife - per wife, was supposed to get colonoscopy, however has not yet given h/o mechanical mitral on AC  - discussed case with GI - will see patient today      - CHRISTEN with HD per renal  - iron sat ~20%      *Hyponatremia: likely 2/2 ESRD, monitor    *Multilobar pneumonia: suspect gram negative pneumonia, also possible staph aureus pneumonia given recent influenza. RVP positive for Flu  - clinically improved from pneumonia, complete course of abx    *Influenza: completed 5 day course of Tamiflu    *A fib with RVR: RVR likely 2/2 infection  - management of infection as above  - cont tele  - HR now controlled  - cont toprol XL 25 mg daily    *Mechanical MVR - also has A fib  - INR management as above  - needs close outpatient cardiology f/u    *ESRD on HD:  - renal consult appreciated  - continue regularly scheduled HD    *Cirrhosis: seen incidentally on CT chest  - bilirubin normal, albumin slightly decreased in setting of infection  - outpatient GI f/u    *HTN: continue home meds  - monitor BP closely    *FEN/GI: renal diet    *Ppx: on coumadin    *Dispo: pending GI evaluation given anemia in setting of AC for mechanical mitral valve - if no planned inpatient w/u, d/c home today with close outpatient f/u with GI, cardiology, HD, INR check at HD    DISCHARGE- 40 minutes spent on discharge planning

## 2018-02-20 NOTE — CONSULT NOTE ADULT - ASSESSMENT
Impression:    1. Anemia of chronic inflammation/renal insufficiency    2. Liver chirrhosis    3. Influenza Pneumonia Impression:    1. Anemia of chronic inflammation/renal insufficiency    2. Liver cirrhosis    3. Influenza Pneumonia    Recommendation:  -give lack of overt bleeding and anemia of chronic disease, would favor outpatient

## 2018-02-20 NOTE — DISCHARGE NOTE ADULT - ADDITIONAL INSTRUCTIONS
Follow-up with your Private Medical Doctor within 1 week.   Follow-up with your Cardiologist within 1 week. Follow-up with your Private Medical Doctor within 1 week.   Follow-up with your Cardiologist within 1 week.  Outpatient follow-up with Gastroenterologist.

## 2018-02-20 NOTE — DISCHARGE NOTE ADULT - MEDICATION SUMMARY - MEDICATIONS TO STOP TAKING
I will STOP taking the medications listed below when I get home from the hospital:    Norvasc 5 mg oral tablet  -- 1 tab(s) by mouth once a day

## 2018-02-20 NOTE — PROGRESS NOTE ADULT - SUBJECTIVE AND OBJECTIVE BOX
CHIEF COMPLAINT: Patient is a 72y old  male who presents with a chief complaint of Cough (20 Feb 2018 07:49)      SUBJECTIVE / OVERNIGHT EVENTS:    No complaints. Feeling well. Denies CP. Denies SOB. Denies abdominal pain.    MEDICATIONS  (STANDING):  atorvastatin 40 milliGRAM(s) Oral at bedtime  donepezil 5 milliGRAM(s) Oral at bedtime  epoetin angelica Injectable 4000 Unit(s) IV Push <User Schedule>  folic acid 1 milliGRAM(s) Oral daily  lactobacillus acidophilus 1 Tablet(s) Oral every 12 hours  metoprolol succinate ER 25 milliGRAM(s) Oral daily  sevelamer hydrochloride 2400 milliGRAM(s) Oral two times a day with meals  warfarin 2 milliGRAM(s) Oral once    MEDICATIONS  (PRN):  guaiFENesin   Syrup  (Sugar-Free) 200 milliGRAM(s) Oral every 6 hours PRN Cough      VITALS:  T(F): 98.1 (02-20-18 @ 05:01), Max: 98.1 (02-20-18 @ 05:01)  HR: 68 (02-20-18 @ 05:01) (62 - 73)  BP: 152/68 (02-20-18 @ 05:01) (136/62 - 152/68)  RR: 18 (02-20-18 @ 05:01) (18 - 18)  SpO2: 98% (02-20-18 @ 05:01)      CAPILLARY BLOOD GLUCOSE    Output     I&O's Summary  T(F): 98.1 (02-20-18 @ 05:01), Max: 98.1 (02-20-18 @ 05:01)  HR: 68 (02-20-18 @ 05:01) (62 - 73)  BP: 152/68 (02-20-18 @ 05:01) (136/62 - 152/68)  RR: 18 (02-20-18 @ 05:01) (18 - 18)  SpO2: 98% (02-20-18 @ 05:01)    PHYSICAL EXAM:  GENERAL: NAD, well-developed  HEAD:  Atraumatic, Normocephalic  EYES: EOMI  NECK: Supple, No JVD  CHEST/LUNG: nonlabored breathing  HEART: nl S1/S2  ABDOMEN: nondistended, soft  EXTREMITIES:  no LE edema  PSYCH: A&Ox3  NEUROLOGY: non-focal  SKIN: No rashes noted    LABS:              8.4                  133  | 27   | 34           4.41  >-----------< 221     ------------------------< 80                    25.2                 4.5  | 91   | 7.16                                         Ca 8.3   Mg 2.2   Ph x            INR: 2.06<H>;    PT: 23.2 SEC<H>;    PTT: 42.7 SEC<H>                MICROBIOLOGY:        RADIOLOGY & ADDITIONAL TESTS:    Imaging Personally Reviewed:    [ ] Consultant(s) Notes Reviewed:  [ ] Care Discussed with Consultants/Other Providers:

## 2018-02-20 NOTE — DISCHARGE NOTE ADULT - MEDICATION SUMMARY - MEDICATIONS TO TAKE
I will START or STAY ON the medications listed below when I get home from the hospital:    Coumadin 3 mg oral tablet  -- 3 milligram(s) by mouth once a day  -- Indication: For Mitral valve replaced    atorvastatin 40 mg oral tablet  -- 1 tab(s) by mouth once a day  -- Indication: For High cholesterol    Toprol-XL 25 mg oral tablet, extended release  -- 1 tab(s) by mouth once a day  -- Indication: For High blood pressure    donepezil 5 mg oral tablet  -- 1 tab(s) by mouth once a day (at bedtime)  -- Indication: For Memory    epoetin angelica  -- 4000 unit(s) intravenous 2 times a week with HD  -- Indication: For ESRD (end stage renal disease) on dialysis    guaiFENesin 100 mg/5 mL oral liquid  -- 10 milliliter(s) by mouth every 6 hours, As needed, Cough  -- Indication: For Cough    Renagel 800 mg oral tablet  -- 3 tab(s) by mouth 3 times a day  -- Indication: For ESRD (end stage renal disease) on dialysis    lactobacillus acidophilus oral capsule  --  by mouth   -- Indication: For supplement     folic acid  -- 5 milligram(s) by mouth once a day  -- Indication: For supplement I will START or STAY ON the medications listed below when I get home from the hospital:    Coumadin 3 mg oral tablet  -- 3 milligram(s) by mouth once a day  -- Indication: For Mitral valve replaced    atorvastatin 40 mg oral tablet  -- 1 tab(s) by mouth once a day  -- Indication: For High cholesterol    Toprol-XL 25 mg oral tablet, extended release  -- 1 tab(s) by mouth once a day  -- Indication: For High blood pressure    donepezil 5 mg oral tablet  -- 1 tab(s) by mouth once a day (at bedtime)  -- Indication: For Memory    epoetin angelica  -- 4000 unit(s) intravenous 2 times a week with HD  -- Indication: For ESRD (end stage renal disease) on dialysis    guaiFENesin 100 mg/5 mL oral liquid  -- 10 milliliter(s) by mouth every 6 hours, As needed, Cough  -- Indication: For Cough    Renagel 800 mg oral tablet  -- 3 tab(s) by mouth 3 times a day  -- Indication: For ESRD (end stage renal disease) on dialysis    folic acid  -- 5 milligram(s) by mouth once a day  -- Indication: For supplement

## 2018-02-20 NOTE — DISCHARGE NOTE ADULT - MEDICATION SUMMARY - MEDICATIONS TO CHANGE
I will SWITCH the dose or number of times a day I take the medications listed below when I get home from the hospital:    Coumadin 3 mg oral tablet  -- 3.5 milligram(s) by mouth once a day

## 2018-02-20 NOTE — DISCHARGE NOTE ADULT - SECONDARY DIAGNOSIS.
Atrial fibrillation Mitral valve replaced ESRD (end stage renal disease) on dialysis Hypertension Anemia

## 2018-02-20 NOTE — DISCHARGE NOTE ADULT - PATIENT PORTAL LINK FT
You can access the [facility] Patient Portal, offered by [facility], by registering with the following website: [email or web address]

## 2018-02-20 NOTE — DISCHARGE NOTE ADULT - OTHER SIGNIFICANT FINDINGS
(PMH) Atrial fibrillation  (PMH) Hypertension  (PMH) ESRD (end stage renal disease) on dialysis  (PSH) Mitral valve replaced  (PSH) A-V fistula

## 2018-02-20 NOTE — DIETITIAN INITIAL EVALUATION ADULT. - PROBLEM SELECTOR PLAN 2
- s/p diltiazem IV push in ED, now in afib  - likely occurred 2/2 active infection   - c/w Toprol XL 25mg daily, may need to up titrate   - monitor on tele  - c/w a/c on coumadin  - check TSH  - cards consult in AM  - check TTE

## 2018-02-20 NOTE — DIETITIAN INITIAL EVALUATION ADULT. - PHYSICAL APPEARANCE
fat loss of orbital and  triceps area , muscle  wasting of  temples, clavicles and shoulders/underweight

## 2018-02-20 NOTE — DISCHARGE NOTE ADULT - CARE PLAN
Principal Discharge DX:	PNA (pneumonia)  Secondary Diagnosis:	Atrial fibrillation  Secondary Diagnosis:	Mitral valve replaced  Secondary Diagnosis:	ESRD (end stage renal disease) on dialysis  Secondary Diagnosis:	Hypertension Principal Discharge DX:	PNA (pneumonia)  Goal:	Prevent complications  Assessment and plan of treatment:	You completed your course of antibiotics. Follow up with your primary care physician for further monitoring in 1-2 weeks. Please call to arrange appointment.  Secondary Diagnosis:	Atrial fibrillation  Assessment and plan of treatment:	Continue Coumadin. Take 3mg daily. Please have your INR checked during dialysis and follow up with your primary care doctor and cardiologist.  Secondary Diagnosis:	Mitral valve replaced  Assessment and plan of treatment:	Continue Coumadin. Take 3mg daily. Please have your INR checked during dialysis and follow up with your primary care doctor and cardiologist.  Secondary Diagnosis:	ESRD (end stage renal disease) on dialysis  Assessment and plan of treatment:	Continue with dialysis according to your regular schedule. Continue supplements as prescribed, follow up with your nephrologist regularly for monitoring.  Secondary Diagnosis:	Hypertension  Assessment and plan of treatment:	Continue metoprolol. Follow up with your primary care physician for further monitoring in 1-2 weeks. Please call to arrange appointment.  Secondary Diagnosis:	Anemia  Assessment and plan of treatment:	Please follow up with GI regarding colonoscopy. Principal Discharge DX:	PNA (pneumonia)  Goal:	Prevent complications  Assessment and plan of treatment:	You completed your course of antibiotics. Follow up with your primary care physician for further monitoring in 1-2 weeks. Please call to arrange appointment.  Secondary Diagnosis:	Atrial fibrillation  Goal:	Rate control  Assessment and plan of treatment:	Continue Coumadin. Take 3mg daily. Please have your INR checked during dialysis and follow up with your primary care doctor and cardiologist.  Secondary Diagnosis:	Mitral valve replaced  Goal:	Continue to monitor.  Assessment and plan of treatment:	Continue Coumadin. Take 3mg daily. Please have your INR checked during dialysis and follow up with your primary care doctor and cardiologist.  Secondary Diagnosis:	ESRD (end stage renal disease) on dialysis  Goal:	Continue to monitor.  Assessment and plan of treatment:	Continue with dialysis according to your regular schedule. Continue supplements as prescribed, follow up with your nephrologist regularly for monitoring.  Secondary Diagnosis:	Hypertension  Goal:	Reduce blood pressure.  Assessment and plan of treatment:	Continue metoprolol. Follow up with your primary care physician for further monitoring in 1-2 weeks. Please call to arrange appointment.  Secondary Diagnosis:	Anemia  Goal:	Continue to monitor.  Assessment and plan of treatment:	Please follow up with GI regarding colonoscopy. Principal Discharge DX:	PNA (pneumonia)  Goal:	Prevent complications  Assessment and plan of treatment:	You completed your course of antibiotics. Follow up with your primary care physician for further monitoring in 1-2 weeks. Please call to arrange appointment.  Secondary Diagnosis:	Atrial fibrillation  Goal:	Rate control  Assessment and plan of treatment:	Continue Coumadin. Take 3mg daily. Please have your INR checked during dialysis on Friday and follow up with your primary care doctor and cardiologist.  Secondary Diagnosis:	Mitral valve replaced  Goal:	Continue to monitor.  Assessment and plan of treatment:	Continue Coumadin. Take 3mg daily. Please have your INR checked during dialysis on Friday and follow up with your primary care doctor and cardiologist.  Secondary Diagnosis:	ESRD (end stage renal disease) on dialysis  Goal:	Continue to monitor.  Assessment and plan of treatment:	Continue with dialysis according to your regular schedule. Continue supplements as prescribed, follow up with your nephrologist regularly for monitoring.  Secondary Diagnosis:	Hypertension  Goal:	Reduce blood pressure.  Assessment and plan of treatment:	Continue metoprolol. Follow up with your primary care physician for further monitoring in 1-2 weeks. Please call to arrange appointment.  Secondary Diagnosis:	Anemia  Goal:	Continue to monitor.  Assessment and plan of treatment:	Please follow up with GI regarding colonoscopy.

## 2018-02-20 NOTE — CONSULT NOTE ADULT - SUBJECTIVE AND OBJECTIVE BOX
Chief Complaint:  Patient is a 72y old  Male who presents with a chief complaint of Cough (2018 07:49)      HPI:  This is a 72 yeaer old man with ESRD on HD, mechanical MVR on warfarin, severe pulmonary hypertension,   The patient's baseline hgb is 7 and he has required 2 units of transfusion to maintain this hemoglobin on this admission.   He denies melena, hematochezia, abdominal pain, nausea vomiting or diarrhea.  He was scheduled to have a colonoscopy as an outpatient.    Allergies:  No Known Allergies      Home Medications:    Hospital Medications:  atorvastatin 40 milliGRAM(s) Oral at bedtime  donepezil 5 milliGRAM(s) Oral at bedtime  epoetin angelica Injectable 4000 Unit(s) IV Push <User Schedule>  folic acid 1 milliGRAM(s) Oral daily  guaiFENesin   Syrup  (Sugar-Free) 200 milliGRAM(s) Oral every 6 hours PRN  lactobacillus acidophilus 1 Tablet(s) Oral every 12 hours  metoprolol succinate ER 25 milliGRAM(s) Oral daily  sevelamer hydrochloride 2400 milliGRAM(s) Oral two times a day with meals      PMHX/PSHX:  Hypertension  ESRD (end stage renal disease) on dialysis  Atrial fibrillation  Mitral valve replaced  A-V fistula      Family history:  No pertinent family history in first degree relatives      Social History:     ROS:     General:  No wt loss, fevers, chills, night sweats, fatigue,   Eyes:  Good vision, no reported pain  ENT:  No sore throat, pain, runny nose, dysphagia  CV:  No pain, palpitations, hypo/hypertension  Resp:  No dyspnea, cough, tachypnea, wheezing  GI:  See HPI  :  No pain, bleeding, incontinence, nocturia  Muscle:  No pain, weakness  Neuro:  No weakness, tingling, memory problems  Psych:  No fatigue, insomnia, mood problems, depression  Endocrine:  No polyuria, polydipsia, cold/heat intolerance  Heme:  No petechiae, ecchymosis, easy bruisability  Skin:  No rash, edema      PHYSICAL EXAM:     GENERAL:  Appears stated age, well-groomed, well-nourished, no distress  HEENT:  NC/AT,  conjunctivae clear and pink,  no JVD  CHEST:  Full & symmetric excursion, no increased effort, breath sounds clear  HEART:  Regular rhythm, S1, S2, no murmur/rub/S3/S4, no abdominal bruit, no edema  ABDOMEN:  Soft, non-tender, non-distended, normoactive bowel sounds,  no masses , no hepatosplenomegaly  EXTREMITIES:  no cyanosis,clubbing or edema  SKIN:  No rash/erythema/ecchymoses/petechiae/wounds/abscess/warm/dry  NEURO:  Alert, oriented    Vital Signs:  Vital Signs Last 24 Hrs  T(C): 36.2 (2018 13:32), Max: 36.7 (2018 05:01)  T(F): 97.1 (:32), Max: 98.1 (2018 05:01)  HR: 89 (:) (62 - 89)  BP: 147/68 (:) (147/56 - 152/68)  BP(mean): --  RR: 18 (:) (18 - 18)  SpO2: 99% (:) (97% - 99%)  Daily     Daily Weight in k (2018 14:53)    LABS:                        8.4    4.41  )-----------( 221      ( 2018 06:00 )             25.2     02-20    133<L>  |  91<L>  |  34<H>  ----------------------------<  80  4.5   |  27  |  7.16<H>    Ca    8.3<L>      2018 06:00  Mg     2.2     02-20        PT/INR - ( 2018 06:00 )   PT: 23.2 SEC;   INR: 2.06          PTT - ( 2018 06:00 )  PTT:42.7 SEC        Imaging: Chief Complaint:  Patient is a 72y old  Male who presents with a chief complaint of Cough (2018 07:49)    The patient is hard of hearing. Interview difficult.  HPI:  This is a 72 yeaer old man with ESRD on HD (MWF), mechanical MVR on warfarin, severe pulmonary hypertension, ?cirrhosis who presented with shortness of breath and was found to have multifocal pneumonia. The patient was flu AH3 positive. He completed a full course of antibiotics. The patient's INR was noted to rise to 7 during the admission and warfarin was held.   The patient's baseline hgb is 7 and he has required 2 units of transfusion to maintain this hemoglobin on this admission.    He denies melena, hematochezia, abdominal pain, nausea vomiting or diarrhea.  He was scheduled to have a colonoscopy as an outpatient for unclear indication.    Allergies:  No Known Allergies      Home Medications:    Hospital Medications:  atorvastatin 40 milliGRAM(s) Oral at bedtime  donepezil 5 milliGRAM(s) Oral at bedtime  epoetin angelica Injectable 4000 Unit(s) IV Push <User Schedule>  folic acid 1 milliGRAM(s) Oral daily  guaiFENesin   Syrup  (Sugar-Free) 200 milliGRAM(s) Oral every 6 hours PRN  lactobacillus acidophilus 1 Tablet(s) Oral every 12 hours  metoprolol succinate ER 25 milliGRAM(s) Oral daily  sevelamer hydrochloride 2400 milliGRAM(s) Oral two times a day with meals      PMHX/PSHX:  Hypertension  ESRD (end stage renal disease) on dialysis  Atrial fibrillation  Mitral valve replaced  A-V fistula      Family history:  No hx of GI or liver disease.      Social History:   nonsmoker nondrinker.  ROS:     General:  No wt loss, fevers, chills, night sweats, fatigue,   Eyes:  Good vision, no reported pain  ENT:  No sore throat, pain, runny nose, dysphagia  CV:  No pain, palpitations, hypo/hypertension  Resp:  No dyspnea, cough, tachypnea, wheezing  GI:  See HPI  :  No pain, bleeding, incontinence, nocturia  Muscle:  No pain, weakness  Neuro:  No weakness, tingling, memory problems  Psych:  No fatigue, insomnia, mood problems, depression  Endocrine:  No polyuria, polydipsia, cold/heat intolerance  Heme:  No petechiae, ecchymosis, easy bruisability  Skin:  No rash, edema      PHYSICAL EXAM:     GENERAL:  Appears stated age, well-groomed, well-nourished, no distress  HEENT:  NC/AT,  conjunctivae clear and pink,  no JVD  CHEST:  Full & symmetric excursion, no increased effort, breath sounds clear  HEART:  Regular rhythm, S1, S2, systolic murmur, no abdominal bruit, no edema  ABDOMEN:  Soft, non-tender, non-distended, normoactive bowel sounds,  no masses , no hepatosplenomegaly  EXTREMITIES:  no cyanosis,clubbing or edema  SKIN:  No rash/erythema/ecchymoses/petechiae/wounds/abscess/warm/dry  NEURO:  Alert, oriented    Vital Signs:  Vital Signs Last 24 Hrs  T(C): 36.2 (2018 13:32), Max: 36.7 (2018 05:01)  T(F): 97.1 (:32), Max: 98.1 (2018 05:01)  HR: 89 (:32) (62 - 89)  BP: 147/68 (:32) (147/56 - 152/68)  BP(mean): --  RR: 18 (:32) (18 - 18)  SpO2: 99% (:32) (97% - 99%)  Daily     Daily Weight in k (2018 14:53)    LABS:                        8.4    4.41  )-----------( 221      ( 2018 06:00 )             25.2     02-20    133<L>  |  91<L>  |  34<H>  ----------------------------<  80  4.5   |  27  |  7.16<H>    Ca    8.3<L>      2018 06:00  Mg     2.2     02-20        PT/INR - ( 2018 06:00 )   PT: 23.2 SEC;   INR: 2.06          PTT - ( 2018 06:00 )  PTT:42.7 SEC        Imaging:

## 2018-02-20 NOTE — DISCHARGE NOTE ADULT - CARE PROVIDERS DIRECT ADDRESSES
,DirectAddress_Unknown ,[email or web address]

## 2018-02-20 NOTE — CHART NOTE - NSCHARTNOTEFT_GEN_A_CORE
NUTRITION SERVICES     Upon Nutritional Assessment by the Registered Dietitian your patient was determined to meet criteria/ has evidence of the following diagnosis/diagnoses:  [ ] Mild Protein Calorie Malnutrition   [ x] Moderate Protein Calorie Malnutrition   [ ] Severe Protein Calorie Malnutrition   [ ] Unspecified Protein Calorie Malnutrition   [ ] Underweight / BMI <19/ inadequate po  [ ] Morbid Obesity / BMI >40    Findings as based on:  •  Comprehensive nutrition assessment and consultation   •  Food acceptance and intake status.  •  Patient education    Treatment:  The following diet has been recommended: Recommend to add Nepro I can po 2 x daily with regular diet with renal restriction. Refer to initial assessment completed under document section.

## 2018-02-21 VITALS
SYSTOLIC BLOOD PRESSURE: 118 MMHG | TEMPERATURE: 98 F | DIASTOLIC BLOOD PRESSURE: 58 MMHG | HEART RATE: 86 BPM | WEIGHT: 119.49 LBS | RESPIRATION RATE: 16 BRPM

## 2018-02-21 LAB
INR BLD: 2.06 — HIGH (ref 0.88–1.17)
PROTHROM AB SERPL-ACNC: 23.2 SEC — HIGH (ref 9.8–13.1)

## 2018-02-21 RX ADMIN — Medication 1 TABLET(S): at 16:53

## 2018-02-21 RX ADMIN — Medication 1 TABLET(S): at 05:08

## 2018-02-21 RX ADMIN — ERYTHROPOIETIN 4000 UNIT(S): 10000 INJECTION, SOLUTION INTRAVENOUS; SUBCUTANEOUS at 12:27

## 2018-02-21 RX ADMIN — SEVELAMER CARBONATE 2400 MILLIGRAM(S): 2400 POWDER, FOR SUSPENSION ORAL at 12:08

## 2018-02-21 RX ADMIN — SEVELAMER CARBONATE 2400 MILLIGRAM(S): 2400 POWDER, FOR SUSPENSION ORAL at 16:52

## 2018-02-21 RX ADMIN — Medication 1 MILLIGRAM(S): at 16:53

## 2018-02-21 NOTE — PROGRESS NOTE ADULT - SUBJECTIVE AND OBJECTIVE BOX
Cardiology/Vascular Medicine Inpatient Progress Note    No new events/complaints.    Telemetry: Afib 60s-70s bpm  INR: 2.06     On warfarin.  No issues with bleeding.  Now off abx, steroids.    Vital Signs Last 24 Hrs  T(C): 36.8 (21 Feb 2018 05:06), Max: 36.8 (20 Feb 2018 23:18)  T(F): 98.3 (21 Feb 2018 05:06), Max: 98.3 (21 Feb 2018 05:06)  HR: 72 (21 Feb 2018 05:06) (72 - 89)  BP: 146/56 (21 Feb 2018 05:06) (146/56 - 153/68)  BP(mean): --  RR: 18 (21 Feb 2018 05:06) (18 - 18)  SpO2: 99% (21 Feb 2018 05:06) (99% - 99%)    Appearance: Elderly man, NAD  HEENT:   Normal oral mucosa, PERRL, EOMI	  Lymphatic: No lymphadenopathy  Cardiovascular: Irreg irreg S1 S2, No JVD, +click  Respiratory: Decreased BS b/l  Psychiatry: awake, able to answer questions  Gastrointestinal:  Soft, Non-tender, + BS	  Skin: +ecchymoses  Neurologic: Non-focal  Extremities: Normal range of motion, No clubbing, cyanosis or edema  Vascular: Peripheral pulses palpable 2+ bilaterally    MEDICATIONS  (STANDING):  atorvastatin 40 milliGRAM(s) Oral at bedtime  donepezil 5 milliGRAM(s) Oral at bedtime  epoetin angelica Injectable 4000 Unit(s) IV Push <User Schedule>  folic acid 1 milliGRAM(s) Oral daily  lactobacillus acidophilus 1 Tablet(s) Oral every 12 hours  metoprolol succinate ER 25 milliGRAM(s) Oral daily  sevelamer hydrochloride 2400 milliGRAM(s) Oral two times a day with meals    MEDICATIONS  (PRN):  guaiFENesin   Syrup  (Sugar-Free) 200 milliGRAM(s) Oral every 6 hours PRN Cough                                  8.4    4.41  )-----------( 221      ( 20 Feb 2018 06:00 )             25.2   02-20    133<L>  |  91<L>  |  34<H>  ----------------------------<  80  4.5   |  27  |  7.16<H>    Ca    8.3<L>      20 Feb 2018 06:00  Mg     2.2     02-20    PT/INR - ( 20 Feb 2018 06:00 )   PT: 23.2 SEC;   INR: 2.06     PTT - ( 20 Feb 2018 06:00 )  PTT:42.7 SEC    < from: CT Chest No Cont (02.10.18 @ 11:09) >    EXAM:  CT CHEST        PROCEDURE DATE:  Feb 10 2018         INTERPRETATION:  CLINICAL INFORMATION: Cough and fever    COMPARISON: CT abdomen from 7/1/2014    PROCEDURE:   CT of the Chest was performed without intravenous contrast.  Sagittal and coronal reformats were performed.      FINDINGS:    CHEST:     LUNGS AND LARGE AIRWAYS: Patent central airways.  Patchy airspace   consolidations most prominent in the right upper lobe and bilateral lower   lobes and to a lesser extent within the posterior left upper lobe   consistent with multifocal pneumonia.  PLEURA: Small to moderate right and small left pleural effusions.  VESSELS: Atherosclerotic changes of the aorta and coronary arteries.  HEART: Cardiomegaly with marked biatrial enlargement.CABG. Mitral valve   replacement.  No pericardial effusion.   MEDIASTINUM AND CHELE: No lymphadenopathy.  CHEST WALL AND LOWER NECK: Sternotomy.  VISUALIZED UPPER ABDOMEN: Cirrhosis. A few scattered hepatic cysts.   Atrophic kidneys.  BONES: Spinal degenerative changes.    IMPRESSION:     Multifocal pneumonia.    Bilateral pleural effusions.    Cirrhosis.    < from: Transthoracic Echocardiogram (02.15.18 @ 10:34) >  Patient name: FILI RAMOS  YOB: 1945   Age: 72 (M)   MR#: [id number]  Study Date: 2/15/2018  Location: [facility] Sonographer: Sarah Reno RAJEEV  Study quality: Technically good  Referring Physician: Gayathri Puckett MD  Blood Pressure: 124/56 mmHg  Height: 165 cm  Weight: 63 kg  BSA: 1.7 m2  ------------------------------------------------------------------------  PROCEDURE: Transthoracic echocardiogram with 2-D, M-Mode  and complete spectral and color flow Doppler.  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 Minimal mitral regurgitation. Mean transmitral  valve gradient equals 10 mm Hg, which is elevated even in  the setting of a prosthetic valve.  Aortic Root: Normal aortic root.  Aortic Valve: Calcified trileaflet aortic valve with normal  opening. Mild aortic regurgitation.  Left Atrium: Severely dilated left atrium.  LA volume index  = 154 cc/m2.  Left Ventricle: Normal left ventricular systolic function.  No segmental wall motion abnormalities. Normal left  ventricular internal dimensions and wall thicknesses.  Right Heart: Severe right atrial enlargement. Right  ventricular enlargement with decreased right ventricular  systolic function. Normal tricuspid valve.  Moderate  tricuspid regurgitation. Normal pulmonic valve. Minimal  pulmonic regurgitation.  Pericardium/PleuraNormal pericardium with no pericardial  effusion.  Hemodynamic: Estimated right ventricular systolic pressure  equals 69 mm Hg, assuming right atrial pressure equals 10  mm Hg, consistent with severe pulmonary hypertension.  ------------------------------------------------------------------------  CONCLUSIONS:  1. Mechanical prosthetic mitral valve replacement. Minimal  mitral regurgitation. Mean transmitral valve gradient  equals 10 mm Hg, which is elevated even in the setting of a  prosthetic valve.  2. Calcified trileaflet aortic valve with normal opening.  Mild aortic regurgitation.  3. Severely dilated left atrium.  LA volume index = 154  cc/m2.  4. Normal left ventricular internal dimensions and wall  thicknesses.  5. Normal left ventricular systolic function. No segmental  wall motion abnormalities.  6. Severe right atrial enlargement.  7. Right ventricular enlargement with decreased right  ventricular systolic function.  8. Estimated right ventricular systolic pressure equals 69  mm Hg, assuming right atrial pressure equals 10 mm Hg,  consistent with severe pulmonary hypertension.  Consider MATTHEW for further evaluation of the prosthetic  mitral valve, if clinically indicated.  ------------------------------------------------------------------------  Confirmed on  2/15/2018 - 13:19:36 by Kaleb Luis M.D.  ------------------------------------------------------------------------    < end of copied text >    MORGAN LIU M.D., RADIOLOGY RESIDENT  This document has been electronically signed.  CHARITO RODRIGUEZ M.D., ATTENDING RADIOLOGIST  This document has been electronically signed. Feb 10 2018  1:11PM

## 2018-02-21 NOTE — PROGRESS NOTE ADULT - PROBLEM SELECTOR PROBLEM 3
Mitral valve replaced
Anemia

## 2018-02-21 NOTE — PROGRESS NOTE ADULT - PROBLEM SELECTOR PLAN 3
Anemia in the setting of ESRD. Continue with CHRISTEN during HD. Further care per primary team. Monitor H/H

## 2018-02-21 NOTE — PROGRESS NOTE ADULT - PROBLEM SELECTOR PROBLEM 2
Atrial fibrillation with rapid ventricular response
HTN (hypertension)

## 2018-02-21 NOTE — PROGRESS NOTE ADULT - ASSESSMENT
72-year-old male with ERSD on HD TIW (MWF) admitted for influenza/PNA. Pt. had HD on 2/19. Plan for HD today.

## 2018-02-21 NOTE — PROGRESS NOTE ADULT - PROVIDER SPECIALTY LIST ADULT
Cardiology
Hospitalist
Internal Medicine
Nephrology
Pulmonology
Pulmonology
Cardiology
Internal Medicine

## 2018-02-21 NOTE — PROGRESS NOTE ADULT - ASSESSMENT
72M with PMH of ESRD on HD (MWF- full session) via LUE AVF, afib, mechanical mitral valve repair (on warfarin) p/w cough x 3 weeks admitted for Flu s/p Tamiflu c/b afib w/ RVR and supratherapeutic INR.       *Anemia: Likely anemia 2/2 CKD; no active bleeding identified   - CHRISTEN with HD per renal  - iron sat ~20%  - has received multiple PRBC transfusions as inpatient  - today hgb is 8.5 - at goal  - appreciate GI recs  - close outpatient GI f/u for continued evaluation of anemia of unclear etiology    *Supratherapeutic INR: on coumadin for mechanical MVR with a fib  - INR improved  - dosing coumadin daily with goal INR 2.5 - 3.5  - f/u INR today  - continue coumadin as outpatient  - per wife, patient gets INR check at HD as outpatient  - f/u INR this coming Friday  - given complicated AC history, mechanical valve, anemia, supratherapeutic INR, needs close f/u with cardiology as outpatient - seen by Dr. Garcia here      *Hyponatremia: likely 2/2 ESRD, monitor    *Multilobar pneumonia: suspect gram negative pneumonia, also possible staph aureus pneumonia given recent influenza. RVP positive for Flu  - clinically improved from pneumonia, complete course of abx    *Influenza: completed 5 day course of Tamiflu    *A fib with RVR: RVR likely 2/2 infection  - management of infection as above  - cont tele  - HR now controlled  - cont toprol XL 25 mg daily    *Mechanical MVR - also has A fib  - INR management as above  - needs close outpatient cardiology f/u    *ESRD on HD:  - renal consult appreciated  - continue regularly scheduled HD    *Cirrhosis: seen incidentally on CT chest  - bilirubin normal, albumin slightly decreased in setting of infection  - outpatient GI f/u    *HTN: continue home meds  - monitor BP closely    *FEN/GI: renal diet    *Ppx: on coumadin    *Dispo: medically stable for d/c home today for close f/u with GI, cardiology as mentioned above  - INR check at HD on Friday    40 minutes spent on discharge planning

## 2018-02-21 NOTE — PROGRESS NOTE ADULT - SUBJECTIVE AND OBJECTIVE BOX
CHIEF COMPLAINT: Patient is a 72y old  male who presents with a chief complaint of Cough (20 Feb 2018 07:49)      SUBJECTIVE / OVERNIGHT EVENTS: No complaints today.    MEDICATIONS  (STANDING):  atorvastatin 40 milliGRAM(s) Oral at bedtime  donepezil 5 milliGRAM(s) Oral at bedtime  epoetin angelica Injectable 4000 Unit(s) IV Push <User Schedule>  folic acid 1 milliGRAM(s) Oral daily  lactobacillus acidophilus 1 Tablet(s) Oral every 12 hours  metoprolol succinate ER 25 milliGRAM(s) Oral daily  sevelamer hydrochloride 2400 milliGRAM(s) Oral two times a day with meals    MEDICATIONS  (PRN):  guaiFENesin   Syrup  (Sugar-Free) 200 milliGRAM(s) Oral every 6 hours PRN Cough      VITALS:  T(F): 98.3 (02-21-18 @ 05:06), Max: 98.3 (02-21-18 @ 05:06)  HR: 72 (02-21-18 @ 05:06) (72 - 89)  BP: 146/56 (02-21-18 @ 05:06) (146/56 - 153/68)  RR: 18 (02-21-18 @ 05:06) (18 - 18)  SpO2: 99% (02-21-18 @ 05:06)      CAPILLARY BLOOD GLUCOSE    Output     I&O's Summary  T(F): 98.3 (02-21-18 @ 05:06), Max: 98.3 (02-21-18 @ 05:06)  HR: 72 (02-21-18 @ 05:06) (72 - 89)  BP: 146/56 (02-21-18 @ 05:06) (146/56 - 153/68)  RR: 18 (02-21-18 @ 05:06) (18 - 18)  SpO2: 99% (02-21-18 @ 05:06)    PHYSICAL EXAM:  GENERAL: NAD, well-developed  HEAD:  Atraumatic, Normocephalic  EYES: EOMI  NECK: Supple, No JVD  CHEST/LUNG: nonlabored breathing  HEART: nl S1/S2  ABDOMEN: nondistended, soft  EXTREMITIES:  no LE edema  PSYCH: A&Ox3  NEUROLOGY: hard of hearing  SKIN: No rashes noted    LABS:              8.4                  133  | 27   | 34           4.41  >-----------< 221     ------------------------< 80                    25.2                 4.5  | 91   | 7.16                                         Ca 8.3   Mg 2.2   Ph x            INR: 2.06<H>;    PT: 23.2 SEC<H>;    PTT: 42.7 SEC<H>                MICROBIOLOGY:        RADIOLOGY & ADDITIONAL TESTS:    Imaging Personally Reviewed:    [ ] Consultant(s) Notes Reviewed:  [ ] Care Discussed with Consultants/Other Providers:

## 2018-02-21 NOTE — PROGRESS NOTE ADULT - SUBJECTIVE AND OBJECTIVE BOX
[facility] DIVISION OF KIDNEY DISEASES AND HYPERTENSION -- FOLLOW UP NOTE  --------------------------------------------------------------------------------  HPI: 72-year-old male with PMH of HTN, MVR, ESRD on HD TIW (MWF) admitted for influenza/PNA. Pt. receives his outpatient HD treatments at [facility]. Pt. had HD on 2/19, tolerated dialysis well. Currently, pt. resting in bed. Pt. feels better and denies SOB, CP, HA or dizziness. Pt. scheduled for HD today.      PAST HISTORY  --------------------------------------------------------------------------------  No significant changes to PMH, PSH, FHx, SHx, unless otherwise noted    ALLERGIES & MEDICATIONS  --------------------------------------------------------------------------------  Allergies    No Known Allergies    Intolerances      Standing Inpatient Medications  atorvastatin 40 milliGRAM(s) Oral at bedtime  donepezil 5 milliGRAM(s) Oral at bedtime  epoetin angelica Injectable 4000 Unit(s) IV Push <User Schedule>  folic acid 1 milliGRAM(s) Oral daily  lactobacillus acidophilus 1 Tablet(s) Oral every 12 hours  metoprolol succinate ER 25 milliGRAM(s) Oral daily  sevelamer hydrochloride 2400 milliGRAM(s) Oral two times a day with meals        REVIEW OF SYSTEMS  --------------------------------------------------------------------------------  General: no fever  CVS: no chest pain  RESP: no SOB  ABD: no abdominal pain  MSK: no edema     VITALS/PHYSICAL EXAM  --------------------------------------------------------------------------------  T(C): 36.8 (02-21-18 @ 05:06), Max: 36.8 (02-20-18 @ 23:18)  HR: 72 (02-21-18 @ 05:06) (72 - 89)  BP: 146/56 (02-21-18 @ 05:06) (146/56 - 153/68)  RR: 18 (02-21-18 @ 05:06) (18 - 18)  SpO2: 99% (02-21-18 @ 05:06) (99% - 99%)  Wt(kg): --        02-20-18 @ 07:01  -  02-21-18 @ 07:00  --------------------------------------------------------  IN: 500 mL / OUT: 0 mL / NET: 500 mL      Physical Exam:             Gen: NAD   	HEENT: No JVD   	Pulm: clear B/L  	CV: S1S2+   	Abd:  soft  	LE: No edema  	Neuro: awake   	Skin: Warm  	Vascular access: YAYA MARTIN: +ginger/savannah         LABS/STUDIES  --------------------------------------------------------------------------------              8.4    4.41  >-----------<  221      [02-20-18 @ 06:00]              25.2     133  |  91  |  34  ----------------------------<  80      [02-20-18 @ 06:00]  4.5   |  27  |  7.16        Ca     8.3     [02-20-18 @ 06:00]      Mg     2.2     [02-20-18 @ 06:00]      PT/INR: PT 23.2 , INR 2.06       [02-20-18 @ 06:00]  PTT: 42.7       [02-20-18 @ 06:00]      Creatinine Trend:  SCr 7.16 [02-20 @ 06:00]  SCr 10.10 [02-19 @ 06:10]  SCr 8.18 [02-18 @ 06:30]  SCr 5.71 [02-17 @ 03:20]  SCr 8.50 [02-16 @ 04:00] [facility] DIVISION OF KIDNEY DISEASES AND HYPERTENSION -- FOLLOW UP NOTE  --------------------------------------------------------------------------------  HPI: 72-year-old male with PMH of HTN, MVR, ESRD on HD TIW (MWF) admitted for influenza/PNA. Pt. receives his outpatient HD treatments at [facility]. Pt. had HD on 2/19, tolerated dialysis well. Currently, pt. resting in bed. Pt. feels better and denies SOB, CP, HA or dizziness. Pt. scheduled for HD today.    PAST HISTORY  --------------------------------------------------------------------------------  No significant changes to PMH, PSH, FHx, SHx, unless otherwise noted    ALLERGIES & MEDICATIONS  --------------------------------------------------------------------------------  Allergies    No Known Allergies    Standing Inpatient Medications  atorvastatin 40 milliGRAM(s) Oral at bedtime  donepezil 5 milliGRAM(s) Oral at bedtime  epoetin angelica Injectable 4000 Unit(s) IV Push <User Schedule>  folic acid 1 milliGRAM(s) Oral daily  lactobacillus acidophilus 1 Tablet(s) Oral every 12 hours  metoprolol succinate ER 25 milliGRAM(s) Oral daily  sevelamer hydrochloride 2400 milliGRAM(s) Oral two times a day with meals    REVIEW OF SYSTEMS  --------------------------------------------------------------------------------  General: no fever  CVS: no chest pain  RESP: no SOB  ABD: no abdominal pain  MSK: no edema     VITALS/PHYSICAL EXAM  --------------------------------------------------------------------------------  T(C): 36.8 (02-21-18 @ 05:06), Max: 36.8 (02-20-18 @ 23:18)  HR: 72 (02-21-18 @ 05:06) (72 - 89)  BP: 146/56 (02-21-18 @ 05:06) (146/56 - 153/68)  RR: 18 (02-21-18 @ 05:06) (18 - 18)  SpO2: 99% (02-21-18 @ 05:06) (99% - 99%)  Wt(kg): --    02-20-18 @ 07:01  -  02-21-18 @ 07:00  --------------------------------------------------------  IN: 500 mL / OUT: 0 mL / NET: 500 mL    Physical Exam:             Gen: NAD   	HEENT: No JVD   	Pulm: clear B/L  	CV: S1S2+   	Abd:  soft  	LE: No edema  	Neuro: awake   	Skin: Warm  	Vascular access: YAYA MARTIN: +ginger/savannah     LABS/STUDIES  --------------------------------------------------------------------------------              8.4    4.41  >-----------<  221      [02-20-18 @ 06:00]              25.2     133  |  91  |  34  ----------------------------<  80      [02-20-18 @ 06:00]  4.5   |  27  |  7.16        Ca     8.3     [02-20-18 @ 06:00]      Mg     2.2     [02-20-18 @ 06:00]    Creatinine Trend:  SCr 7.16 [02-20 @ 06:00]  SCr 10.10 [02-19 @ 06:10]  SCr 8.18 [02-18 @ 06:30]  SCr 5.71 [02-17 @ 03:20]  SCr 8.50 [02-16 @ 04:00]

## 2018-02-21 NOTE — PROGRESS NOTE ADULT - PROBLEM SELECTOR PROBLEM 5
Essential hypertension

## 2018-02-21 NOTE — PROGRESS NOTE ADULT - PROBLEM SELECTOR PLAN 1
Pt. with ESRD on HD TIW (MWF). Pt. had HD on 2/19,  tolerated it well without complications. Pt. clinically stable. Plan for maintenance HD today. Monitor BP and labs Pt. with ESRD on HD TIW (MWF). Pt. had HD on 2/19,  tolerated it well. Pt. clinically stable. Plan for maintenance HD today. Monitor BP and labs

## 2018-02-21 NOTE — PROGRESS NOTE ADULT - PROBLEM SELECTOR PROBLEM 1
Pneumonia due to infectious organism, unspecified laterality, unspecified part of lung
ESRD (end stage renal disease) on dialysis
Pneumonia due to infectious organism, unspecified laterality, unspecified part of lung

## 2018-02-21 NOTE — PROGRESS NOTE ADULT - PROBLEM SELECTOR PROBLEM 4
ESRD (end stage renal disease) on dialysis

## 2018-02-26 ENCOUNTER — APPOINTMENT (OUTPATIENT)
Dept: CARDIOLOGY | Facility: CLINIC | Age: 73
End: 2018-02-26
Payer: MEDICARE

## 2018-02-26 DIAGNOSIS — I10 ESSENTIAL (PRIMARY) HYPERTENSION: ICD-10-CM

## 2018-02-26 DIAGNOSIS — I35.9 NONRHEUMATIC AORTIC VALVE DISORDER, UNSPECIFIED: ICD-10-CM

## 2018-02-26 DIAGNOSIS — I48.91 UNSPECIFIED ATRIAL FIBRILLATION: ICD-10-CM

## 2018-02-26 DIAGNOSIS — R07.9 CHEST PAIN, UNSPECIFIED: ICD-10-CM

## 2018-02-26 DIAGNOSIS — I05.9 RHEUMATIC MITRAL VALVE DISEASE, UNSPECIFIED: ICD-10-CM

## 2018-02-26 PROCEDURE — 99215 OFFICE O/P EST HI 40 MIN: CPT

## 2018-02-26 PROCEDURE — 93000 ELECTROCARDIOGRAM COMPLETE: CPT

## 2018-03-01 ENCOUNTER — APPOINTMENT (OUTPATIENT)
Dept: UROLOGY | Facility: CLINIC | Age: 73
End: 2018-03-01
Payer: MEDICARE

## 2018-03-01 DIAGNOSIS — N19 UNSPECIFIED KIDNEY FAILURE: ICD-10-CM

## 2018-03-01 DIAGNOSIS — R97.20 ELEVATED PROSTATE, SPECIFIC ANTIGEN [PSA]: ICD-10-CM

## 2018-03-01 PROCEDURE — 99204 OFFICE O/P NEW MOD 45 MIN: CPT

## 2018-03-05 ENCOUNTER — APPOINTMENT (OUTPATIENT)
Dept: UROLOGY | Facility: CLINIC | Age: 73
End: 2018-03-05
Payer: MEDICARE

## 2018-03-05 ENCOUNTER — OUTPATIENT (OUTPATIENT)
Dept: OUTPATIENT SERVICES | Facility: HOSPITAL | Age: 73
LOS: 1 days | End: 2018-03-05
Payer: MEDICARE

## 2018-03-05 PROCEDURE — 52000 CYSTOURETHROSCOPY: CPT

## 2018-03-05 PROCEDURE — 76775 US EXAM ABDO BACK WALL LIM: CPT | Mod: 26

## 2018-03-05 PROCEDURE — 76775 US EXAM ABDO BACK WALL LIM: CPT

## 2018-03-15 DIAGNOSIS — R31.21 ASYMPTOMATIC MICROSCOPIC HEMATURIA: ICD-10-CM

## 2018-09-06 ENCOUNTER — APPOINTMENT (OUTPATIENT)
Dept: UROLOGY | Facility: CLINIC | Age: 73
End: 2018-09-06
Payer: MEDICARE

## 2018-09-06 VITALS
HEIGHT: 71 IN | SYSTOLIC BLOOD PRESSURE: 193 MMHG | TEMPERATURE: 97.7 F | DIASTOLIC BLOOD PRESSURE: 86 MMHG | HEART RATE: 65 BPM | BODY MASS INDEX: 19.32 KG/M2 | WEIGHT: 138 LBS | RESPIRATION RATE: 17 BRPM

## 2018-09-06 DIAGNOSIS — Z00.00 ENCOUNTER FOR GENERAL ADULT MEDICAL EXAMINATION W/OUT ABNORMAL FINDINGS: ICD-10-CM

## 2018-09-06 DIAGNOSIS — R31.21 ASYMPTOMATIC MICROSCOPIC HEMATURIA: ICD-10-CM

## 2018-09-06 PROCEDURE — 99213 OFFICE O/P EST LOW 20 MIN: CPT

## 2018-11-14 ENCOUNTER — INPATIENT (INPATIENT)
Facility: HOSPITAL | Age: 73
LOS: 5 days | Discharge: HOME HEALTH | End: 2018-11-20
Attending: HOSPITALIST | Admitting: HOSPITALIST
Payer: MEDICARE

## 2018-11-14 VITALS
DIASTOLIC BLOOD PRESSURE: 77 MMHG | HEART RATE: 82 BPM | RESPIRATION RATE: 19 BRPM | OXYGEN SATURATION: 99 % | SYSTOLIC BLOOD PRESSURE: 133 MMHG | TEMPERATURE: 98 F

## 2018-11-14 DIAGNOSIS — K66.1 HEMOPERITONEUM: ICD-10-CM

## 2018-11-14 DIAGNOSIS — I10 ESSENTIAL (PRIMARY) HYPERTENSION: ICD-10-CM

## 2018-11-14 DIAGNOSIS — K74.60 UNSPECIFIED CIRRHOSIS OF LIVER: ICD-10-CM

## 2018-11-14 DIAGNOSIS — I48.91 UNSPECIFIED ATRIAL FIBRILLATION: ICD-10-CM

## 2018-11-14 DIAGNOSIS — N18.6 END STAGE RENAL DISEASE: ICD-10-CM

## 2018-11-14 DIAGNOSIS — R74.8 ABNORMAL LEVELS OF OTHER SERUM ENZYMES: ICD-10-CM

## 2018-11-14 DIAGNOSIS — N18.9 CHRONIC KIDNEY DISEASE, UNSPECIFIED: ICD-10-CM

## 2018-11-14 DIAGNOSIS — R93.8 ABNORMAL FINDINGS ON DIAGNOSTIC IMAGING OF OTHER SPECIFIED BODY STRUCTURES: ICD-10-CM

## 2018-11-14 DIAGNOSIS — Z29.9 ENCOUNTER FOR PROPHYLACTIC MEASURES, UNSPECIFIED: ICD-10-CM

## 2018-11-14 LAB
ALBUMIN SERPL ELPH-MCNC: 3.4 G/DL — SIGNIFICANT CHANGE UP (ref 3.3–5)
ALP SERPL-CCNC: 62 U/L — SIGNIFICANT CHANGE UP (ref 40–120)
ALT FLD-CCNC: 5 U/L — SIGNIFICANT CHANGE UP (ref 4–41)
ANTIBODY ID 1_1: SIGNIFICANT CHANGE UP
ANTIBODY ID 1_2: SIGNIFICANT CHANGE UP
APTT BLD: 53.3 SEC — HIGH (ref 27.5–36.3)
AST SERPL-CCNC: 21 U/L — SIGNIFICANT CHANGE UP (ref 4–40)
BASE EXCESS BLDV CALC-SCNC: 4 MMOL/L — SIGNIFICANT CHANGE UP
BILIRUB SERPL-MCNC: 1.4 MG/DL — HIGH (ref 0.2–1.2)
BLD GP AB SCN SERPL QL: POSITIVE — SIGNIFICANT CHANGE UP
BLOOD GAS VENOUS - CREATININE: 7.66 MG/DL — HIGH (ref 0.5–1.3)
BUN SERPL-MCNC: 35 MG/DL — HIGH (ref 7–23)
CALCIUM SERPL-MCNC: 9.2 MG/DL — SIGNIFICANT CHANGE UP (ref 8.4–10.5)
CHLORIDE BLDV-SCNC: 99 MMOL/L — SIGNIFICANT CHANGE UP (ref 96–108)
CHLORIDE SERPL-SCNC: 94 MMOL/L — LOW (ref 98–107)
CK MB BLD-MCNC: 1.41 NG/ML — SIGNIFICANT CHANGE UP (ref 1–6.6)
CK SERPL-CCNC: 393 U/L — HIGH (ref 30–200)
CK SERPL-CCNC: 442 U/L — HIGH (ref 30–200)
CO2 SERPL-SCNC: 24 MMOL/L — SIGNIFICANT CHANGE UP (ref 22–31)
CREAT SERPL-MCNC: 7.23 MG/DL — HIGH (ref 0.5–1.3)
DAT C3-SP REAG RBC QL: NEGATIVE — SIGNIFICANT CHANGE UP
DAT POLY-SP REAG RBC QL: NEGATIVE — SIGNIFICANT CHANGE UP
DIRECT COOMBS IGG: NEGATIVE — SIGNIFICANT CHANGE UP
GAS PNL BLDV: 132 MMOL/L — LOW (ref 136–146)
GLUCOSE BLDV-MCNC: 80 — SIGNIFICANT CHANGE UP (ref 70–99)
GLUCOSE SERPL-MCNC: 81 MG/DL — SIGNIFICANT CHANGE UP (ref 70–99)
HCO3 BLDV-SCNC: 27 MMOL/L — SIGNIFICANT CHANGE UP (ref 20–27)
HCT VFR BLD CALC: 28.6 % — LOW (ref 39–50)
HCT VFR BLD CALC: 29 % — LOW (ref 39–50)
HCT VFR BLDV CALC: 33 % — LOW (ref 39–51)
HGB BLD-MCNC: 9.2 G/DL — LOW (ref 13–17)
HGB BLD-MCNC: 9.2 G/DL — LOW (ref 13–17)
HGB BLDV-MCNC: 10.7 G/DL — LOW (ref 13–17)
INR BLD: 4.74 — HIGH (ref 0.88–1.17)
LACTATE BLDV-MCNC: 1.4 MMOL/L — SIGNIFICANT CHANGE UP (ref 0.5–2)
LIDOCAIN IGE QN: 103.1 U/L — HIGH (ref 7–60)
MCHC RBC-ENTMCNC: 31 PG — SIGNIFICANT CHANGE UP (ref 27–34)
MCHC RBC-ENTMCNC: 31.2 PG — SIGNIFICANT CHANGE UP (ref 27–34)
MCHC RBC-ENTMCNC: 31.7 % — LOW (ref 32–36)
MCHC RBC-ENTMCNC: 32.2 % — SIGNIFICANT CHANGE UP (ref 32–36)
MCV RBC AUTO: 96.9 FL — SIGNIFICANT CHANGE UP (ref 80–100)
MCV RBC AUTO: 97.6 FL — SIGNIFICANT CHANGE UP (ref 80–100)
NRBC # FLD: 0 — SIGNIFICANT CHANGE UP
NRBC # FLD: 0 — SIGNIFICANT CHANGE UP
PCO2 BLDV: 40 MMHG — LOW (ref 41–51)
PH BLDV: 7.46 PH — HIGH (ref 7.32–7.43)
PLATELET # BLD AUTO: 100 K/UL — LOW (ref 150–400)
PLATELET # BLD AUTO: 106 K/UL — LOW (ref 150–400)
PMV BLD: 10.1 FL — SIGNIFICANT CHANGE UP (ref 7–13)
PMV BLD: 9.6 FL — SIGNIFICANT CHANGE UP (ref 7–13)
PO2 BLDV: 34 MMHG — LOW (ref 35–40)
POTASSIUM BLDV-SCNC: 4.4 MMOL/L — SIGNIFICANT CHANGE UP (ref 3.4–4.5)
POTASSIUM SERPL-MCNC: 5 MMOL/L — SIGNIFICANT CHANGE UP (ref 3.5–5.3)
POTASSIUM SERPL-SCNC: 5 MMOL/L — SIGNIFICANT CHANGE UP (ref 3.5–5.3)
PROT SERPL-MCNC: 7.8 G/DL — SIGNIFICANT CHANGE UP (ref 6–8.3)
PROTHROM AB SERPL-ACNC: 55.2 SEC — HIGH (ref 9.8–13.1)
RBC # BLD: 2.95 M/UL — LOW (ref 4.2–5.8)
RBC # BLD: 2.97 M/UL — LOW (ref 4.2–5.8)
RBC # FLD: 15.4 % — HIGH (ref 10.3–14.5)
RBC # FLD: 15.5 % — HIGH (ref 10.3–14.5)
RH IG SCN BLD-IMP: POSITIVE — SIGNIFICANT CHANGE UP
SAO2 % BLDV: 58.6 % — LOW (ref 60–85)
SODIUM SERPL-SCNC: 135 MMOL/L — SIGNIFICANT CHANGE UP (ref 135–145)
TROPONIN T, HIGH SENSITIVITY: 97 NG/L — CRITICAL HIGH (ref ?–14)
TROPONIN T, HIGH SENSITIVITY: 98 NG/L — CRITICAL HIGH (ref ?–14)
WBC # BLD: 4.62 K/UL — SIGNIFICANT CHANGE UP (ref 3.8–10.5)
WBC # BLD: 4.9 K/UL — SIGNIFICANT CHANGE UP (ref 3.8–10.5)
WBC # FLD AUTO: 4.62 K/UL — SIGNIFICANT CHANGE UP (ref 3.8–10.5)
WBC # FLD AUTO: 4.9 K/UL — SIGNIFICANT CHANGE UP (ref 3.8–10.5)

## 2018-11-14 PROCEDURE — 74174 CTA ABD&PLVS W/CONTRAST: CPT | Mod: 26

## 2018-11-14 PROCEDURE — 86077 PHYS BLOOD BANK SERV XMATCH: CPT

## 2018-11-14 PROCEDURE — 71045 X-RAY EXAM CHEST 1 VIEW: CPT | Mod: 26

## 2018-11-14 PROCEDURE — 99223 1ST HOSP IP/OBS HIGH 75: CPT | Mod: GC

## 2018-11-14 PROCEDURE — 99222 1ST HOSP IP/OBS MODERATE 55: CPT | Mod: GC

## 2018-11-14 PROCEDURE — 99223 1ST HOSP IP/OBS HIGH 75: CPT | Mod: GC,AI

## 2018-11-14 PROCEDURE — 93010 ELECTROCARDIOGRAM REPORT: CPT

## 2018-11-14 RX ORDER — FOLIC ACID 0.8 MG
1 TABLET ORAL
Qty: 0 | Refills: 0 | COMMUNITY

## 2018-11-14 RX ORDER — WARFARIN SODIUM 2.5 MG/1
3 TABLET ORAL
Qty: 0 | Refills: 0 | COMMUNITY

## 2018-11-14 RX ORDER — DONEPEZIL HYDROCHLORIDE 10 MG/1
1 TABLET, FILM COATED ORAL
Qty: 0 | Refills: 0 | COMMUNITY

## 2018-11-14 RX ORDER — MORPHINE SULFATE 50 MG/1
2 CAPSULE, EXTENDED RELEASE ORAL ONCE
Qty: 0 | Refills: 0 | Status: DISCONTINUED | OUTPATIENT
Start: 2018-11-14 | End: 2018-11-14

## 2018-11-14 RX ORDER — METOPROLOL TARTRATE 50 MG
75 TABLET ORAL ONCE
Qty: 0 | Refills: 0 | Status: COMPLETED | OUTPATIENT
Start: 2018-11-14 | End: 2018-11-14

## 2018-11-14 RX ORDER — SEVELAMER CARBONATE 2400 MG/1
2 POWDER, FOR SUSPENSION ORAL
Qty: 0 | Refills: 0 | COMMUNITY

## 2018-11-14 RX ORDER — METOPROLOL TARTRATE 50 MG
1 TABLET ORAL
Qty: 0 | Refills: 0 | COMMUNITY

## 2018-11-14 RX ORDER — AMLODIPINE BESYLATE 2.5 MG/1
1 TABLET ORAL
Qty: 0 | Refills: 0 | COMMUNITY

## 2018-11-14 RX ORDER — HYDROMORPHONE HYDROCHLORIDE 2 MG/ML
0.5 INJECTION INTRAMUSCULAR; INTRAVENOUS; SUBCUTANEOUS ONCE
Qty: 0 | Refills: 0 | Status: DISCONTINUED | OUTPATIENT
Start: 2018-11-14 | End: 2018-11-15

## 2018-11-14 RX ORDER — HYDRALAZINE HCL 50 MG
1 TABLET ORAL
Qty: 0 | Refills: 0 | COMMUNITY

## 2018-11-14 RX ORDER — SUCROFERRIC OXYHYDROXIDE 500 MG/1
500 TABLET, CHEWABLE ORAL
Qty: 0 | Refills: 0 | COMMUNITY

## 2018-11-14 RX ORDER — WARFARIN SODIUM 2.5 MG/1
1 TABLET ORAL
Qty: 0 | Refills: 0 | COMMUNITY

## 2018-11-14 RX ORDER — MORPHINE SULFATE 50 MG/1
2 CAPSULE, EXTENDED RELEASE ORAL
Qty: 0 | Refills: 0 | Status: DISCONTINUED | OUTPATIENT
Start: 2018-11-14 | End: 2018-11-14

## 2018-11-14 RX ADMIN — MORPHINE SULFATE 2 MILLIGRAM(S): 50 CAPSULE, EXTENDED RELEASE ORAL at 10:58

## 2018-11-14 RX ADMIN — MORPHINE SULFATE 2 MILLIGRAM(S): 50 CAPSULE, EXTENDED RELEASE ORAL at 07:02

## 2018-11-14 RX ADMIN — Medication 75 MILLIGRAM(S): at 11:02

## 2018-11-14 NOTE — CONSULT NOTE ADULT - SUBJECTIVE AND OBJECTIVE BOX
[facility] DIVISION OF KIDNEY DISEASES AND HYPERTENSION -- INITIAL CONSULT NOTE  --------------------------------------------------------------------------------  HPI:  74 y/o Cantonese speaking male with history of mechanical heart valve on coumadin, ESRD presented with acute onset of left back pain. Pt admitted for RP hematoma. Renal consulted for continuation of HD.     Pt gets HD (MWF) at [place]. His nephrologist is Dr. Goldberg. Last HD was on Monday (11/12), tolerated treatment well as per son. His vascular access is LUE AVF. pt seen resting in bed. Denies chest pain, dyspnea, nausea, vomiting, diarrhea, fever or chills.          PAST HISTORY  --------------------------------------------------------------------------------  PAST MEDICAL & SURGICAL HISTORY:  Hypertension  ESRD (end stage renal disease) on dialysis  Atrial fibrillation  Mitral valve replaced: mechanical valve  A-V fistula    FAMILY HISTORY:  No pertinent family history in first degree relatives    PAST SOCIAL HISTORY:    ALLERGIES & MEDICATIONS  --------------------------------------------------------------------------------  Allergies    No Known Allergies    Intolerances      Standing Inpatient Medications    PRN Inpatient Medications      REVIEW OF SYSTEMS  --------------------------------------------------------------------------------  gen; no fever, chills  chest; no dyspnea  GI; no n/v/d  ; no changes in urination  CVS; no chest pain  MSk; no joint pain or swelling. no leg swelling      VITALS/PHYSICAL EXAM  --------------------------------------------------------------------------------  T(C): 36.9 (11-14-18 @ 08:47), Max: 36.9 (11-14-18 @ 08:47)  HR: 89 (11-14-18 @ 08:47) (82 - 89)  BP: 188/91 (11-14-18 @ 08:47) (133/77 - 188/91)  RR: 18 (11-14-18 @ 08:47) (18 - 19)  SpO2: 94% (11-14-18 @ 08:47) (94% - 99%)  Wt(kg): --        Physical Exam:  	Gen: NAD  	HEENT:, supple neck  	Pulm: CTA B/L  	CV: RRR, S1S2; no rub  	Back: No spinal or CVA tenderness  	Abd: +BS, soft, nontender/nondistended  	: No suprapubic tenderness  	UE: Warm, no edema; no asterixis  	LE: Warm,  no edema  	Neuro: awake and alert  	Vascular access: LUE AVF, aneurysmal, thrill+ bruit+    LABS/STUDIES  --------------------------------------------------------------------------------              9.2    4.62  >-----------<  106      [11-14-18 @ 06:40]              29.0     135  |  94  |  35  ----------------------------<  81      [11-14-18 @ 06:40]  5.0   |  24  |  7.23        Ca     9.2     [11-14-18 @ 06:40]    TPro  7.8  /  Alb  3.4  /  TBili  1.4  /  DBili  x   /  AST  21  /  ALT  5   /  AlkPhos  62  [11-14-18 @ 06:40]    PT/INR: PT 55.2 , INR 4.74       [11-14-18 @ 06:40]  PTT: 53.3       [11-14-18 @ 06:40]          [11-14-18 @ 06:40]    Creatinine Trend:  SCr 7.23 [11-14 @ 06:40]        Iron 30, TIBC 147, %sat --      [02-12-18 @ 06:50]  Ferritin 1670      [02-11-18 @ 06:10]  TSH 4.49      [02-10-18 @ 14:22]    HBsAb 41.6      [07-02-14 @ 23:32]  HBsAg PRELIM POSITIVE REPEATED      [02-12-18 @ 10:33]  HBcAb POSITIVE      [07-02-14 @ 23:32] [facility] DIVISION OF KIDNEY DISEASES AND HYPERTENSION -- INITIAL CONSULT NOTE  --------------------------------------------------------------------------------  HPI:  74 y/o Cantonese speaking male with history of mechanical heart valve on coumadin, ESRD presented with acute onset of left back pain. Pt admitted for RP hematoma. Renal consulted for continuation of HD.     Pt access R AVF  pt seen resting in bed. Denies chest pain, dyspnea, nausea, vomiting, diarrhea, fever or chills.          PAST HISTORY  --------------------------------------------------------------------------------  PAST MEDICAL & SURGICAL HISTORY:  Hypertension  ESRD (end stage renal disease) on dialysis  Atrial fibrillation  Mitral valve replaced: mechanical valve  A-V fistula    FAMILY HISTORY:  No pertinent family history in first degree relatives    PAST SOCIAL HISTORY:    ALLERGIES & MEDICATIONS  --------------------------------------------------------------------------------  Allergies    No Known Allergies    Intolerances      Standing Inpatient Medications    PRN Inpatient Medications      REVIEW OF SYSTEMS  --------------------------------------------------------------------------------  gen; no fever, chills  chest; no dyspnea  GI; no n/v/d  ; no changes in urination  CVS; no chest pain  MSk; no joint pain or swelling. no leg swelling      VITALS/PHYSICAL EXAM  --------------------------------------------------------------------------------  T(C): 36.9 (11-14-18 @ 08:47), Max: 36.9 (11-14-18 @ 08:47)  HR: 89 (11-14-18 @ 08:47) (82 - 89)  BP: 188/91 (11-14-18 @ 08:47) (133/77 - 188/91)  RR: 18 (11-14-18 @ 08:47) (18 - 19)  SpO2: 94% (11-14-18 @ 08:47) (94% - 99%)  Wt(kg): --        Physical Exam:  	Gen: NAD  	HEENT:, supple neck  	Pulm: CTA B/L  	CV: RRR, S1S2; no rub  	Back: left flank  tenderness  	Abd: +BS, soft, nontender/nondistended  	: No suprapubic tenderness  	UE: Warm, no edema; no asterixis  	LE: Warm,  no edema  	Neuro: awake and alert  	Vascular access: LUE AVF, aneurysmal, thrill+ bruit+.... R AVF as well bruit +    LABS/STUDIES  --------------------------------------------------------------------------------              9.2    4.62  >-----------<  106      [11-14-18 @ 06:40]              29.0     135  |  94  |  35  ----------------------------<  81      [11-14-18 @ 06:40]  5.0   |  24  |  7.23        Ca     9.2     [11-14-18 @ 06:40]    TPro  7.8  /  Alb  3.4  /  TBili  1.4  /  DBili  x   /  AST  21  /  ALT  5   /  AlkPhos  62  [11-14-18 @ 06:40]    PT/INR: PT 55.2 , INR 4.74       [11-14-18 @ 06:40]  PTT: 53.3       [11-14-18 @ 06:40]          [11-14-18 @ 06:40]    Creatinine Trend:  SCr 7.23 [11-14 @ 06:40]        Iron 30, TIBC 147, %sat --      [02-12-18 @ 06:50]  Ferritin 1670      [02-11-18 @ 06:10]  TSH 4.49      [02-10-18 @ 14:22]    HBsAb 41.6      [07-02-14 @ 23:32]  HBsAg PRELIM POSITIVE REPEATED      [02-12-18 @ 10:33]  HBcAb POSITIVE      [07-02-14 @ 23:32]

## 2018-11-14 NOTE — ED ADULT NURSE REASSESSMENT NOTE - NS ED NURSE REASSESS COMMENT FT1
Pt remains in room 18. Patient looks uncomfortable and is bleeding mildly out of the mouth. MD aware of status.

## 2018-11-14 NOTE — CONSULT NOTE ADULT - SUBJECTIVE AND OBJECTIVE BOX
GENERAL SURGERY CONSULT NOTE    Patient is a 73y old  Male who presents with a chief complaint of back pain    HPI: 72 yo Cantonese speaking male with history of mechanical valve on coumadin, ESRD (MWF, most recent dialysis on Monday) and cirrhosis presented with acute onset of left back pain. Per patient and his son, the pain started last night when patient bent down to put on socks. Patient denies any external trauma or injuries. Patient never had similar pain before. Patient came to ER as the pain persisted and limited mobility. Patient denies pain anywhere else, shortness of breath, or lightheadedness. Patient is mentating well but hard at hearing even with hearing aid and denies any numbness of tingling of lower extremity. Patient and family don't recall when was the last time patient had his INR checked outpatient.     10-points review of system performed with pertinent negative and postive findings documented in the HPI     PAST MEDICAL & SURGICAL HISTORY:  Hypertension  ESRD (end stage renal disease) on dialysis  Atrial fibrillation  Mitral valve replaced: mechanical valve  A-V fistula    FAMILY HISTORY: No pertinent family history in first degree relatives    SOCIAL HISTORY: No pertinent social history     Home Medications:  atorvastatin 40 mg oral tablet: 1 tab(s) orally once a day (10 Feb 2018 04:14)  Coumadin 3 mg oral tablet: 3 milligram(s) orally once a day (20 Feb 2018 13:43)  donepezil 5 mg oral tablet: 1 tab(s) orally once a day (at bedtime) (10 Feb 2018 04:14)  epoetin angelica: 4000 unit(s) intravenous 2 times a week with HD (20 Feb 2018 07:54)  folic acid: 5 milligram(s) orally once a day (10 Feb 2018 04:14)  guaiFENesin 100 mg/5 mL oral liquid: 10 milliliter(s) orally every 6 hours, As needed, Cough (20 Feb 2018 07:54)  Renagel 800 mg oral tablet: 3 tab(s) orally 3 times a day (10 Feb 2018 04:14)  Toprol-XL 25 mg oral tablet, extended release: 1 tab(s) orally once a day (10 Feb 2018 04:14)    Allergies: No Known Allergies    Exam:  Vital Signs Last 24 Hrs  T(C): 36.9 (14 Nov 2018 08:47), Max: 36.9 (14 Nov 2018 08:47)  T(F): 98.4 (14 Nov 2018 08:47), Max: 98.4 (14 Nov 2018 08:47)  HR: 89 (14 Nov 2018 08:47) (82 - 89)  BP: 188/91 (14 Nov 2018 08:47) (133/77 - 188/91)  BP(mean): --  RR: 18 (14 Nov 2018 08:47) (18 - 19)  SpO2: 94% (14 Nov 2018 08:47) (94% - 99%)    General: patient is mentating well, nontoxic appearing, hard at hearing   Resp: unlabored breathing on room air   Abd: abdomen soft, nondistended, nontender  Back: tender to palpation over the left flank  Ext: patient is lying decubitus with both legs bent due to back pain, patient able to move both lower extremities and sensation equal bilateral, palpable DP/PT bilateral                          9.2    4.62  )-----------( 106      ( 14 Nov 2018 06:40 )             29.0     11-14    135  |  94<L>  |  35<H>  ----------------------------<  81  5.0   |  24  |  7.23<H>    Ca    9.2      14 Nov 2018 06:40    TPro  7.8  /  Alb  3.4  /  TBili  1.4<H>  /  DBili  x   /  AST  21  /  ALT  5   /  AlkPhos  62  11-14    PT/INR - ( 14 Nov 2018 06:40 )   PT: 55.2 SEC;   INR: 4.74        PTT - ( 14 Nov 2018 06:40 )  PTT:53.3 SEC    IMAGING STUDIES:  EXAM:  CT ANGIO ABD PELV (W)AW IC    FINDINGS:  LOWER CHEST: Status post median sternotomy. Cardiomegaly with prior   atrial enlargement. Status post mitral valve replacement. Coronary artery   calcifications.  Consolidative opacities in the right lower lobe and bilateral lower lobe   patchy opacities, right greater than left likely atelectasis with   superimposed pneumonia.  Small right and trace left pleural effusion.  LIVER: Cirrhosis with few stable scattered hepatic cysts in the right   hepatic lobe.  BILE DUCTS: Normal caliber.  GALLBLADDER: Cholelithiasis.  SPLEEN: Within normal limits.  PANCREAS: 1.6 cm hypodense lesion in the neck of the pancreas. A 6 mm   hypodense lesion in the body of the pancreas is unchanged since prior   examination, and likely low-grade cystic pancreatic neoplasm such as IPMN.  ADRENALS: Within normal limits.  KIDNEYS/URETERS: Bilateral atrophic kidneys. Right renal cysts. Unchanged   several left intrarenal calculi. No hydronephrosis.  BLADDER: Underdistended with mildly thickened wall.  REPRODUCTIVE ORGANS: Prostate is normal in size.  BOWEL: No bowel obstruction. Appendix nonvisualized.  PERITONEUM: Large volume abdominal and pelvic ascites.  VESSELS:  Atherosclerotic changes. Abdominal aorta is normal in caliber.   The celiac axis, SMA and CARTER are patent. RETROPERITONEUM: No   lymphadenopathy.    ABDOMINAL WALL: Within normal limits.  BONES: Degenerative changes of the spine.    IMPRESSION:   *  No evidence of abdominal aorticaneurysm, dissection, or rupture.  *  Asymmetric enlargement of the left psoas muscle, new compared to prior   examination. Findings are concerning for retroperitoneal hematoma.  *  Consolidative opacities in the right lower lobe and patchy  opacities   in the bilateral lower lobe likely pneumonia with atelectasis.  *  Small right and trace left pleural effusion.  *  Large volume abdominal pelvic ascites.  *  A 1.6 cm hypodense lesion in the neck of the pancreas. Consider   further evaluation with MR in nonemergent basis.

## 2018-11-14 NOTE — H&P ADULT - ASSESSMENT
72 yo Cantonese speaking male with history of mechanical valve, AFib on coumadin, HTN, ESRD (MWF, most recent dialysis on Monday) and cirrhosis presented with acute onset of lower left back pain found to have a supratherapeutic INR and retroperitoneal bleed

## 2018-11-14 NOTE — PROVIDER CONTACT NOTE (OTHER) - ASSESSMENT
pt bleeding in mouth, some blood noted on pillow also patient asking for pain medicine pt has dried  blood in mouth, some blood noted on pillow also patient asking for pain medicine

## 2018-11-14 NOTE — ED PROVIDER NOTE - CRITICAL CARE PROVIDED
interpretation of diagnostic studies/additional history taking/consultation with other physicians/consult w/ pt's family directly relating to pts condition/documentation/direct patient care (not related to procedure)

## 2018-11-14 NOTE — PROVIDER CONTACT NOTE (OTHER) - ACTION/TREATMENT ORDERED:
MD Baker aware, ordered to give due amlodipine, and hydralazine and recheck bp will come assess pt at bedside MD Baker aware,   will come assess pt at bedside

## 2018-11-14 NOTE — H&P ADULT - PROBLEM SELECTOR PLAN 1
- patient presenting in the setting of severe back pain  - found to have retroperitoneal bleed on CT:  Asymmetric enlargement of the left psoas muscle, new compared to prior examination. Findings are concerning for retroperitoneal hematoma.  -Surgery consulted: No surgical intervention indicated, recommend cardiology consult to manage supra therapeutic INR in context of mechanical valve, trend serial H/H and IR consult if concern for active bleed and transfuse as needed   -Will trend CBC q 8hrs  - Hold coumadin for now - patient presenting in the setting of severe back pain  - found to have retroperitoneal bleed on CT:  Asymmetric enlargement of the left psoas muscle, new compared to prior examination. Findings are concerning for retroperitoneal hematoma.  -Surgery consulted: No surgical intervention indicated, recommend cardiology consult to manage supra therapeutic INR in context of mechanical valve, trend serial H/H and IR consult if concern for active bleed and transfuse as needed   -Will trend CBC q 8hrs, and daily INR  - Hold coumadin for now, will not reverse at this time given mechanical valve

## 2018-11-14 NOTE — PROVIDER CONTACT NOTE (OTHER) - ACTION/TREATMENT ORDERED:
MD Baker aware, ordered to give due amlodipine, and hydralazine and recheck bp . nursing care to continue

## 2018-11-14 NOTE — CONSULT NOTE ADULT - ATTENDING COMMENTS
I saw and examined the patient and agree with the above note.    Spontaneous RP hematoma  -NPO/IVF  -Resuscitation, transfuse if crit <21 or actively bleeding (If active ACS target crit >30)  -Locate site: CTA of the abd/pelvis to evaluate for extravasation. - psoas fullness likely site without active extrav. IR if continues to bleed  -Treatment plan - reverse coagulopathy if safe since pt has a mech valve, q 4-6 crit checks. Neuro checks for compressive symptoms.  -No surgical intervention at this time.  Acute blood loss anemia   -Transfuse as necessary as noted above  -consider 1:1:1 transfusion (PRBC:FFP:platelets)  -Reverse coagulopathy if safe per cardiology  Anticoagulated state  -reverse asap if needed. If stable, risk/benefits of reversal should be discerns by the primary team.    I spent 55min reviewing data, images and documentation as well as in care coordination.    Tanvir Martinez MD  Acute and Critical Cre Surgery    Cell: [phone]  Email: [email or web address]

## 2018-11-14 NOTE — ED ADULT NURSE NOTE - OBJECTIVE STATEMENT
pt received spot 18, alert and orientedx3, Cantonese speaking with son at bedside for translation. MD silver at bedside. pt c.o lower back pain starting tonight. denies fall or injury to area. denies cp sob dizziness nausea. AV fistula noted to right arm with bandages covering. AV fistula noted to left upper arm (not currently in use). 18G EJ noted to right side of neck. will monitor.

## 2018-11-14 NOTE — H&P ADULT - PROBLEM SELECTOR PLAN 2
- ON HD MWF  - renal consulted, will dialyze tomorrow  -per discussion with renal fellow preet Anand to use L arm, which has non working fistula for blood draws, but no draws on fistula itself.

## 2018-11-14 NOTE — H&P ADULT - PROBLEM SELECTOR PLAN 6
- continue on iron supplementation - continue on iron supplementation  -monitor vitals q 4  -CBC q 8hrs given retroperitoneal bleed

## 2018-11-14 NOTE — ED ADULT TRIAGE NOTE - CHIEF COMPLAINT QUOTE
pt c/o lower back pain since 1am per son pt has been "sleeping a lot more than usual," and lethargic, pmh esrd, LAV fistula, due for dialysis today, denies leg pain, urinary symptoms, cp or sob, no n/v/d. comfortable in triage.

## 2018-11-14 NOTE — ED PROVIDER NOTE - PROGRESS NOTE DETAILS
Enrique: Hematoma near psoas muscle. Has mechanical valve. INR 4.7. Full LE strength; pain w/ standing. Risk (valve clot) of reversing INR outweighs benefits. Will hold coumadin. Call Gen Surg. If not a candidate for their service, admit to medicine to monitor INR, pain meds, dialysis. EILEEN Davidson: Spoke with surgery - will consult on patient - asking for cardiology consult to comment on supratherapeutic INR. Paged Dr. Branden Garcia.

## 2018-11-14 NOTE — H&P ADULT - HISTORY OF PRESENT ILLNESS
Note: attempted to use  ID [id number], however pt hard of hearing. History obtained from son, patient and chart.     74 yo Cantonese speaking male with history of mechanical valve, AFib on coumadin, HTN, ESRD (MWF, most recent dialysis on Monday) and cirrhosis presented with acute onset of lower left back pain. Per patient and his son, the pain started last night when patient bent down to put on socks. Patient tried to sleep it off overnight, however patient requested to be taken to hospital this AM given severe lower back pain. Patient and son deny any external trauma or injuries. Patient never had similar pain before. Patient came to ER as the pain persisted. Patient normally ambulates without assistance, but given pain has not been able to ambulate Patient denies pain anywhere else, shortness breath, CP, palpitations or lightheadedness.  Patient and family don't recall when was the last time patient had his INR checked outpatient.     REVIEW OF SYSTEMS:  Limited as above, pt hard of hearing

## 2018-11-14 NOTE — ED PROVIDER NOTE - MEDICAL DECISION MAKING DETAILS
pt with sudden onset back pain.  Based on history and exam most consistent with a MS etiology (sciatica most likely).  However with dementia and AC and being a vascularpath need to consider AAA or retro bleed.  Will place IJ due to HD access and get CTA and labs while treating symptomatically.  Due for HD today at 500 am so if able to be dc will need to assure there is a seat for HD as got contrast or will admit for HD.

## 2018-11-14 NOTE — PATIENT PROFILE ADULT - LIVES WITH
spouse adult child(wilber)/spouse/(son and son's girlfriend), private home, 2-3 steps-to-enter/exit home with Unilateral Handrail

## 2018-11-14 NOTE — H&P ADULT - PROBLEM SELECTOR PLAN 5
- likely in the setting of ESRD  - not endorsing CP, or signs concerning for CAD  -EKG with no acute ST changes, showing Afib with incomplete RBBB  - will trend x 1 to assess if plateaus

## 2018-11-14 NOTE — H&P ADULT - NSHPPHYSICALEXAM_GEN_ALL_CORE
Vital Signs Last 24 Hrs  T(C): 37 (14 Nov 2018 14:42), Max: 37 (14 Nov 2018 14:42)  T(F): 98.6 (14 Nov 2018 14:42), Max: 98.6 (14 Nov 2018 14:42)  HR: 65 (14 Nov 2018 14:42) (65 - 89)  BP: 165/71 (14 Nov 2018 14:42) (133/77 - 188/91)  BP(mean): --  RR: 19 (14 Nov 2018 14:42) (18 - 19)  SpO2: 96% (14 Nov 2018 14:42) (94% - 99%)      PHYSICAL EXAM:  GENERAL: NAD,   HEAD:  Atraumatic, Normocephalic  EYES: EOMI  ENMT: Moist mucous membranes  NECK: Supple, no neck masses  HEART: irregularly irregular; 2/6 systolic murmur, rubs, or gallops  RESPIRATORY: CTA B/L, No W/R/R  ABDOMEN: Soft, Nontender, Nondistended; Bowel sounds present  Back: Tenderness on the L flank, no discolorations  NEUROLOGY: hard of hearing, No focal deficits   EXTREMITIES:  2+ Peripheral Pulses, No clubbing, cyanosis, or edema  SKIN: warm, dry, normal color, no rash or abnormal lesions
normal

## 2018-11-14 NOTE — ED ADULT NURSE NOTE - NSIMPLEMENTINTERV_GEN_ALL_ED
Implemented All Fall with Harm Risk Interventions:  [place] to call system. Call bell, personal items and telephone within reach. Instruct patient to call for assistance. Room bathroom lighting operational. Non-slip footwear when patient is off stretcher. Physically safe environment: no spills, clutter or unnecessary equipment. Stretcher in lowest position, wheels locked, appropriate side rails in place. Provide visual cue, wrist band, yellow gown, etc. Monitor gait and stability. Monitor for mental status changes and reorient to person, place, and time. Review medications for side effects contributing to fall risk. Reinforce activity limits and safety measures with patient and family. Provide visual clues: red socks.

## 2018-11-14 NOTE — H&P ADULT - NSHPLABSRESULTS_GEN_ALL_CORE
9.2    4.62  )-----------( 106      ( 14 Nov 2018 06:40 )             29.0       11-14    135  |  94<L>  |  35<H>  ----------------------------<  81  5.0   |  24  |  7.23<H>    Ca    9.2      14 Nov 2018 06:40    TPro  7.8  /  Alb  3.4  /  TBili  1.4<H>  /  DBili  x   /  AST  21  /  ALT  5   /  AlkPhos  62  11-14                  PT/INR - ( 14 Nov 2018 06:40 )   PT: 55.2 SEC;   INR: 4.74          PTT - ( 14 Nov 2018 06:40 )  PTT:53.3 SEC    Lactate Trend      CARDIAC MARKERS ( 14 Nov 2018 06:40 )  x     / x     / 393 u/L / x     / x            CAPILLARY BLOOD GLUCOSE

## 2018-11-14 NOTE — ED PROVIDER NOTE - OBJECTIVE STATEMENT
72 yo with dementia, ESRD MWF HD, HTN and mechanical valve presenting with sudden onset of severe lower back pain that does not radiate.  Cannot move or ambulate due to pain.  At rest pain is tolerable.  No NVD no dysuria no fevers.  Has never had this pain before.    History obtained with help of sone and Cantonese  - [phone]

## 2018-11-14 NOTE — H&P ADULT - PROBLEM SELECTOR PLAN 3
- holding off coumadin  - at home on metoprolol ER 25mg q day and metoprolol ER 50mg q day  -consider metoprolol tartrate 25mg BID for now

## 2018-11-14 NOTE — CONSULT NOTE ADULT - PROBLEM SELECTOR RECOMMENDATION 2
Bp elevated this AM. Plan for HD. Monitor BP on current meds. Bp elevated this AM. Monitor BP on current meds.

## 2018-11-14 NOTE — H&P ADULT - NSHPSOCIALHISTORY_GEN_ALL_CORE
Denies alcohol use, drug use, or cigarette use. Lives with son and wife. Can ambulate without walker.

## 2018-11-14 NOTE — CONSULT NOTE ADULT - PROBLEM SELECTOR RECOMMENDATION 9
ESRD on HD MWF. Pt. last had HD on 11/12.. Pt. clinically stable. Will arrange for maintenance HD today. Monitor BP and labs. ESRD on HD MWF. Pt. last had HD on 11/12.. Pt. clinically stable. Will arrange for maintenance HD tomorrow . Monitor BP and labs.

## 2018-11-14 NOTE — H&P ADULT - PMH
Atrial fibrillation    Cirrhosis    Dementia    ESRD (end stage renal disease) on dialysis    Hepatitis B    Hypertension

## 2018-11-14 NOTE — H&P ADULT - PROBLEM SELECTOR PLAN 8
- per CT scan:   - Consolidative opacities in the right lower lobe and patchy  opacities   in the bilateral lower lobe likely pneumonia with atelectasis.  -However patient not endorsing resp complaints or symptoms at this time  - Pt also with:  A 1.6 cm hypodense lesion in the neck of the pancreas. Consider   further evaluation with MR in nonemergent basis as outpt

## 2018-11-14 NOTE — PROVIDER CONTACT NOTE (OTHER) - ACTION/TREATMENT ORDERED:
MD Baker aware, ok to give amlodipine and hydralazine, ordered to recheck blood pressure later. nursing care to continue

## 2018-11-14 NOTE — ED PROVIDER NOTE - PHYSICAL EXAMINATION
Gen: Well appearing in moderate distress from pain  Head: NC/AT  Neck: trachea midline  Resp:  No distress CTAB  CV: irreg irreg  Ext: no deformities lower back diffusely TTP with similar pain as he was feeling earlier   Neuro:  A&O appears non focal  Skin:  Warm and dry as visualized

## 2018-11-14 NOTE — H&P ADULT - ATTENDING COMMENTS
Patient seen and examined. Chart/lab reviewed. Agree with above with modifications.  73M Cantonese speaking, h/o HTN, ESRD on HD (MWF) at [facility], Afib/MVR on coumadin, cirrhosis, p/w acute onset L lower back pain, found to have RP bleed in the setting of supratherapeutic INR 4.7. He's hemodynamically stable, denies chest pain/sob.    PE: nontoxic, c/o severe L back pain, lung clear, heart irregular, mech click; abdomen soft, back: L lower ; extrem: no leg edema, b/l arm AVF (right forearm AVF +bruit, L AVF with aneurysmal dilatation)  CT angio asymmetric L psoas muscle enlargement c/w RP hematoma  Assessment/plan:   # Back pain due to RP bleed in the setting of supratherapeutic INR: trend CBC q8h, daily PT/INR, no AC reversal as pt is hemodynamically stable with mechanical valve, if continues to bleed, then consult IR for embolization, surgery consult appreciated, no surgical intervention  # acute blood loss anemia in the setting of RP hematoma: monitor CBC, transfuse prn to keep Hgb>8 or symptoms   # ESRD: HD as scheduled, f/u renal  # Afib/mech MVR: hold coumadin, daily PT/INR, aim INR 2.5-3.5, consult cardiology Dr. Garcia to manage AC  # HTN: cont home meds  # DVT prophylaxis: SCD, no heparin

## 2018-11-15 LAB
ALBUMIN SERPL ELPH-MCNC: 3.1 G/DL — LOW (ref 3.3–5)
ALP SERPL-CCNC: 62 U/L — SIGNIFICANT CHANGE UP (ref 40–120)
ALT FLD-CCNC: 5 U/L — SIGNIFICANT CHANGE UP (ref 4–41)
APTT BLD: 56.9 SEC — HIGH (ref 27.5–36.3)
AST SERPL-CCNC: 24 U/L — SIGNIFICANT CHANGE UP (ref 4–40)
BILIRUB SERPL-MCNC: 1.2 MG/DL — SIGNIFICANT CHANGE UP (ref 0.2–1.2)
BUN SERPL-MCNC: 49 MG/DL — HIGH (ref 7–23)
CALCIUM SERPL-MCNC: 9.2 MG/DL — SIGNIFICANT CHANGE UP (ref 8.4–10.5)
CHLORIDE SERPL-SCNC: 94 MMOL/L — LOW (ref 98–107)
CO2 SERPL-SCNC: 22 MMOL/L — SIGNIFICANT CHANGE UP (ref 22–31)
CREAT SERPL-MCNC: 9.06 MG/DL — HIGH (ref 0.5–1.3)
GLUCOSE SERPL-MCNC: 85 MG/DL — SIGNIFICANT CHANGE UP (ref 70–99)
HCT VFR BLD CALC: 30.8 % — LOW (ref 39–50)
HCT VFR BLD CALC: 31.1 % — LOW (ref 39–50)
HCT VFR BLD CALC: 31.2 % — LOW (ref 39–50)
HGB BLD-MCNC: 10 G/DL — LOW (ref 13–17)
HGB BLD-MCNC: 10.1 G/DL — LOW (ref 13–17)
HGB BLD-MCNC: 9.9 G/DL — LOW (ref 13–17)
INR BLD: 4.59 — HIGH (ref 0.88–1.17)
INR BLD: 6.17 — CRITICAL HIGH (ref 0.88–1.17)
MAGNESIUM SERPL-MCNC: 2.4 MG/DL — SIGNIFICANT CHANGE UP (ref 1.6–2.6)
MCHC RBC-ENTMCNC: 30.3 PG — SIGNIFICANT CHANGE UP (ref 27–34)
MCHC RBC-ENTMCNC: 30.6 PG — SIGNIFICANT CHANGE UP (ref 27–34)
MCHC RBC-ENTMCNC: 30.8 PG — SIGNIFICANT CHANGE UP (ref 27–34)
MCHC RBC-ENTMCNC: 32.1 % — SIGNIFICANT CHANGE UP (ref 32–36)
MCHC RBC-ENTMCNC: 32.2 % — SIGNIFICANT CHANGE UP (ref 32–36)
MCHC RBC-ENTMCNC: 32.4 % — SIGNIFICANT CHANGE UP (ref 32–36)
MCV RBC AUTO: 94.2 FL — SIGNIFICANT CHANGE UP (ref 80–100)
MCV RBC AUTO: 94.5 FL — SIGNIFICANT CHANGE UP (ref 80–100)
MCV RBC AUTO: 96 FL — SIGNIFICANT CHANGE UP (ref 80–100)
NRBC # FLD: 0 — SIGNIFICANT CHANGE UP
PHOSPHATE SERPL-MCNC: 6 MG/DL — HIGH (ref 2.5–4.5)
PLATELET # BLD AUTO: 107 K/UL — LOW (ref 150–400)
PLATELET # BLD AUTO: 115 K/UL — LOW (ref 150–400)
PLATELET # BLD AUTO: 122 K/UL — LOW (ref 150–400)
PMV BLD: 10.1 FL — SIGNIFICANT CHANGE UP (ref 7–13)
PMV BLD: 9.7 FL — SIGNIFICANT CHANGE UP (ref 7–13)
PMV BLD: 9.9 FL — SIGNIFICANT CHANGE UP (ref 7–13)
POTASSIUM SERPL-MCNC: 5.4 MMOL/L — HIGH (ref 3.5–5.3)
POTASSIUM SERPL-SCNC: 5.4 MMOL/L — HIGH (ref 3.5–5.3)
PROT SERPL-MCNC: 7.6 G/DL — SIGNIFICANT CHANGE UP (ref 6–8.3)
PROTHROM AB SERPL-ACNC: 54.9 SEC — HIGH (ref 9.8–13.1)
PROTHROM AB SERPL-ACNC: 74.4 SEC — HIGH (ref 9.8–13.1)
RBC # BLD: 3.21 M/UL — LOW (ref 4.2–5.8)
RBC # BLD: 3.3 M/UL — LOW (ref 4.2–5.8)
RBC # BLD: 3.3 M/UL — LOW (ref 4.2–5.8)
RBC # FLD: 15.3 % — HIGH (ref 10.3–14.5)
RBC # FLD: 15.4 % — HIGH (ref 10.3–14.5)
RBC # FLD: 15.6 % — HIGH (ref 10.3–14.5)
SODIUM SERPL-SCNC: 132 MMOL/L — LOW (ref 135–145)
WBC # BLD: 4.88 K/UL — SIGNIFICANT CHANGE UP (ref 3.8–10.5)
WBC # BLD: 4.99 K/UL — SIGNIFICANT CHANGE UP (ref 3.8–10.5)
WBC # BLD: 5.47 K/UL — SIGNIFICANT CHANGE UP (ref 3.8–10.5)
WBC # FLD AUTO: 4.88 K/UL — SIGNIFICANT CHANGE UP (ref 3.8–10.5)
WBC # FLD AUTO: 4.99 K/UL — SIGNIFICANT CHANGE UP (ref 3.8–10.5)
WBC # FLD AUTO: 5.47 K/UL — SIGNIFICANT CHANGE UP (ref 3.8–10.5)

## 2018-11-15 PROCEDURE — 99233 SBSQ HOSP IP/OBS HIGH 50: CPT | Mod: GC

## 2018-11-15 PROCEDURE — 99223 1ST HOSP IP/OBS HIGH 75: CPT

## 2018-11-15 RX ORDER — OXYCODONE AND ACETAMINOPHEN 5; 325 MG/1; MG/1
1 TABLET ORAL EVERY 6 HOURS
Qty: 0 | Refills: 0 | Status: DISCONTINUED | OUTPATIENT
Start: 2018-11-15 | End: 2018-11-15

## 2018-11-15 RX ORDER — PHYTONADIONE (VIT K1) 5 MG
2.5 TABLET ORAL ONCE
Qty: 0 | Refills: 0 | Status: DISCONTINUED | OUTPATIENT
Start: 2018-11-15 | End: 2018-11-15

## 2018-11-15 RX ORDER — PHYTONADIONE (VIT K1) 5 MG
2.5 TABLET ORAL ONCE
Qty: 0 | Refills: 0 | Status: COMPLETED | OUTPATIENT
Start: 2018-11-15 | End: 2018-11-15

## 2018-11-15 RX ADMIN — HYDROMORPHONE HYDROCHLORIDE 0.5 MILLIGRAM(S): 2 INJECTION INTRAMUSCULAR; INTRAVENOUS; SUBCUTANEOUS at 00:02

## 2018-11-15 RX ADMIN — HYDROMORPHONE HYDROCHLORIDE 0.5 MILLIGRAM(S): 2 INJECTION INTRAMUSCULAR; INTRAVENOUS; SUBCUTANEOUS at 00:17

## 2018-11-15 RX ADMIN — Medication 2.5 MILLIGRAM(S): at 15:19

## 2018-11-15 NOTE — PROGRESS NOTE ADULT - PROBLEM SELECTOR PLAN 4
- BP initially elevated to 188 systolic  - C/w home doses of amlodipine 10mg and hydralazine 10mg, monitor closely with hold parameters  - Vitals q4

## 2018-11-15 NOTE — PHYSICAL THERAPY INITIAL EVALUATION ADULT - PATIENT PROFILE REVIEW, REHAB EVAL
yes/ACTIVITY: increase as tolerated; consult with Rossana Sher RN --> pt. OK to sit at edge of bed with PT with out of bed on HOLD secondary to increased INR (6.19)

## 2018-11-15 NOTE — PHYSICAL THERAPY INITIAL EVALUATION ADULT - PLANNED THERAPY INTERVENTIONS, PT EVAL
stair training PRN/strengthening/transfer training/balance training/bed mobility training/gait training

## 2018-11-15 NOTE — PHYSICAL THERAPY INITIAL EVALUATION ADULT - LIVES WITH, PROFILE
children/spouse/(son and his girlfriend), private home, 2-3 steps-to-enter/exit home with Unilateral Handrail

## 2018-11-15 NOTE — PROVIDER CONTACT NOTE (OTHER) - BACKGROUND
Pt dx with retroperitoneal bleed hx of afib, hepatitis b, cirrhosis, hypertension, end stage renal disease
pt dx with retroperitoneal bleed hx of hepatitis b, cirrhosis, afib, Dementia, hypertension and end stage renal; disease

## 2018-11-15 NOTE — PROGRESS NOTE ADULT - PROBLEM SELECTOR PLAN 3
- Holding coumadin  - On metoprolol succinate 25mg qdaily and 50 mg q daily at home  - Will c/w lopressor 25mg BID for now

## 2018-11-15 NOTE — PROGRESS NOTE ADULT - SUBJECTIVE AND OBJECTIVE BOX
SUBJECTIVE:  No acute overnight events. Patient continues to report left hip pain.    OBJECTIVE:  Vital Signs Last 24 Hrs  T(C): 36.5 (15 Nov 2018 11:30), Max: 37 (14 Nov 2018 14:42)  T(F): 97.7 (15 Nov 2018 11:30), Max: 98.6 (14 Nov 2018 14:42)  HR: 61 (15 Nov 2018 11:30) (61 - 96)  BP: 136/70 (15 Nov 2018 11:30) (136/70 - 176/64)  BP(mean): --  RR: 16 (15 Nov 2018 11:30) (16 - 19)  SpO2: 96% (15 Nov 2018 10:25) (92% - 98%)    Inpatient Medications:  MEDICATIONS  (STANDING):  amLODIPine   Tablet 10 milliGRAM(s) Oral daily  atorvastatin 40 milliGRAM(s) Oral at bedtime  docusate sodium 100 milliGRAM(s) Oral daily  ferrous    sulfate 325 milliGRAM(s) Oral daily  folic acid 1 milliGRAM(s) Oral daily  hydrALAZINE 10 milliGRAM(s) Oral three times a day  influenza   Vaccine 0.5 milliLiter(s) IntraMuscular once  metoprolol tartrate 25 milliGRAM(s) Oral two times a day  oxyCODONE    5 mG/acetaminophen 325 mG 1 Tablet(s) Oral every 6 hours  phytonadione   Solution 2.5 milliGRAM(s) Oral once  senna 2 Tablet(s) Oral at bedtime  sevelamer hydrochloride 1600 milliGRAM(s) Oral every 12 hours    Physical Examination:  GEN: NAD, resting quietly  NEURO: AAOx3, CN II-XII grossly intact, no focal deficits  PULM: symmetric chest rise bilaterally, no increased WOB  CV: regular rate, peripheral pulses intact  ABD: soft, ND, non-tender; L hip TTP  EXTR: no cyanosis or edema, moving all extremities    LABS:                        10.0   5.47  )-----------( 122      ( 15 Nov 2018 08:01 )             31.1       11-15    132<L>  |  94<L>  |  49<H>  ----------------------------<  85  5.4<H>   |  22  |  9.06<H>    Ca    9.2      15 Nov 2018 08:01  Phos  6.0     11-15  Mg     2.4     11-15    TPro  7.6  /  Alb  3.1<L>  /  TBili  1.2  /  DBili  x   /  AST  24  /  ALT  5   /  AlkPhos  62  11-15    CULTURES:  none    IMAGING:  no new imaging

## 2018-11-15 NOTE — PROVIDER CONTACT NOTE (OTHER) - RECOMMENDATIONS
MD to be notified and come see pt
MD to be aware, to give due medicine
MD to be notified and come see pt
MD to be notified, administer due medicine

## 2018-11-15 NOTE — PHYSICAL THERAPY INITIAL EVALUATION ADULT - DISCHARGE DISPOSITION, PT EVAL
TBD post-further functional mobility assessment; PT to attempt to see pt. again tomorrow, if time permits, for further functional mobility assessment

## 2018-11-15 NOTE — PHYSICAL THERAPY INITIAL EVALUATION ADULT - PERTINENT HX OF CURRENT PROBLEM, REHAB EVAL
Pt. is a 72 y/o Cantonese-speaking male admitted to [facility] on 11/14/18 with a dx of low back pain and hemoperitoneum.  PT consult request secondary to deconditioning.  H/O ESRD on HD.  CXR = pulmonary edema.

## 2018-11-15 NOTE — PROGRESS NOTE ADULT - SUBJECTIVE AND OBJECTIVE BOX
Patient is a 73y old  Male who presents with a chief complaint of RP hematoma (14 Nov 2018 14:01)      SUBJECTIVE/OVERNIGHT EVENTS     No acute events overnight. Denies nausea, vomiting, constipation, diarrhea.    MEDICATIONS  (STANDING):  amLODIPine   Tablet 10 milliGRAM(s) Oral daily  atorvastatin 40 milliGRAM(s) Oral at bedtime  ferrous    sulfate 325 milliGRAM(s) Oral daily  folic acid 1 milliGRAM(s) Oral daily  hydrALAZINE 10 milliGRAM(s) Oral three times a day  influenza   Vaccine 0.5 milliLiter(s) IntraMuscular once  metoprolol tartrate 25 milliGRAM(s) Oral two times a day  sevelamer hydrochloride 1600 milliGRAM(s) Oral every 12 hours    MEDICATIONS  (PRN):    CAPILLARY BLOOD GLUCOSE        I&O's Summary        Vital Signs Last 24 Hrs  T(C): 36.6 (15 Nov 2018 06:26), Max: 37 (14 Nov 2018 14:42)  T(F): 97.8 (15 Nov 2018 06:26), Max: 98.6 (14 Nov 2018 14:42)  HR: 85 (15 Nov 2018 06:26) (65 - 96)  BP: 156/73 (15 Nov 2018 06:26) (148/67 - 188/91)  BP(mean): --  RR: 17 (15 Nov 2018 06:26) (17 - 19)  SpO2: 95% (15 Nov 2018 06:26) (92% - 98%)    PHYSICAL EXAM:  GENERAL: NAD, well-developed  HEAD:  Atraumatic, Normocephalic  EYES: EOMI, PERRLA, conjunctiva and sclera clear  NECK: Supple, No JVD  CHEST/LUNG: Clear to auscultation bilaterally; No wheeze  HEART: Regular rate and rhythm; No murmurs, rubs, or gallops  ABDOMEN: Soft, Nontender, Nondistended; Bowel sounds present  EXTREMITIES:  2+ Peripheral Pulses, No clubbing, cyanosis, or edema  PSYCH: AAOx3  NEUROLOGY: non-focal  SKIN: No rashes or lesions    LABS                        10.1   4.99  )-----------( 107      ( 14 Nov 2018 23:56 )             31.2                         9.2    4.90  )-----------( 100      ( 14 Nov 2018 17:18 )             28.6     11-14    135  |  94<L>  |  35<H>  ----------------------------<  81  5.0   |  24  |  7.23<H>    Ca    9.2      14 Nov 2018 06:40    TPro  7.8  /  Alb  3.4  /  TBili  1.4<H>  /  DBili  x   /  AST  21  /  ALT  5   /  AlkPhos  62  11-14    PT/INR - ( 14 Nov 2018 06:40 )   PT: 55.2 SEC;   INR: 4.74          PTT - ( 14 Nov 2018 06:40 )  PTT:53.3 SEC   14 Nov 2018 17:18 Troponin x     /  u/L / CKMB 1.41 ng/mL / CKMB Units x       14 Nov 2018 06:40 Troponin x     /  u/L / CKMB x     / CKMB Units x                    RADIOLOGY & ADDITIONAL TESTS:    Imaging Personally Reviewed:    Consultant(s) Notes Reviewed:      Care Discussed with Consultants/Other Providers:

## 2018-11-15 NOTE — PROGRESS NOTE ADULT - PROBLEM SELECTOR PLAN 1
- Abd CT:  Asymmetric enlargement of the left psoas muscle, new compared to prior examination. Findings are concerning for retroperitoneal hematoma.  - Surgery consulted. No surgical intervention indicated, appreciate recs.  - F/u cardiology consult to manage supra therapeutic INR in context of mechanical valve and risks/benefits with coumadin reversal. Holding coumadin for now.  -Trend CBC q 8hrs, and daily INR  - Keep transfusion goal >7  - If ongoing active bleeding, will consult IR - Abd CT:  Asymmetric enlargement of the left psoas muscle, new compared to prior examination. Findings are concerning for retroperitoneal hematoma.  - Surgery consulted. No surgical intervention indicated, appreciate recs.  - F/u cardiology consult to manage supra therapeutic INR in context of mechanical valve and risks/benefits with coumadin reversal. Holding coumadin for now.  -Trend CBC q12hrs, and daily INR  - Keep transfusion goal >7  - If ongoing active bleeding, will consult IR  - Pain control w/ percocet 5mg q6 for now - Abd CT:  Asymmetric enlargement of the left psoas muscle, new compared to prior examination. Findings are concerning for retroperitoneal hematoma.  - Surgery consulted. No surgical intervention indicated, appreciate recs.  - F/u cardiology consult to manage supra therapeutic INR in context of mechanical valve and risks/benefits with coumadin reversal. Holding coumadin for now.  -Trend CBC q12hrs, and daily INR  - Keep transfusion goal >7  - If ongoing active bleeding, will consult IR  - Pain control w/ percocet 5mg q6 for now  - Starting vitamin K infusion

## 2018-11-15 NOTE — PROGRESS NOTE ADULT - SUBJECTIVE AND OBJECTIVE BOX
[facility] DIVISION OF KIDNEY DISEASES AND HYPERTENSION -HEMODIALYSIS NOTE    Pt seen and examined. Resting comfortably in bed.    PAST HISTORY  --------------------------------------------------------------------------------  No significant changes to PMH, PSH, FHx, SHx, unless otherwise noted    ALLERGIES & MEDICATIONS  --------------------------------------------------------------------------------  Allergies    No Known Allergies    Intolerances      Standing Inpatient Medications  amLODIPine   Tablet 10 milliGRAM(s) Oral daily  atorvastatin 40 milliGRAM(s) Oral at bedtime  docusate sodium 100 milliGRAM(s) Oral daily  ferrous    sulfate 325 milliGRAM(s) Oral daily  folic acid 1 milliGRAM(s) Oral daily  hydrALAZINE 10 milliGRAM(s) Oral three times a day  influenza   Vaccine 0.5 milliLiter(s) IntraMuscular once  metoprolol tartrate 25 milliGRAM(s) Oral two times a day  senna 2 Tablet(s) Oral at bedtime  sevelamer hydrochloride 1600 milliGRAM(s) Oral every 12 hours    PRN Inpatient Medications  oxyCODONE    5 mG/acetaminophen 325 mG 1 Tablet(s) Oral every 6 hours PRN      REVIEW OF SYSTEMS  --------------------------------------------------------------------------------  unable to obtain      VITALS/PHYSICAL EXAM  --------------------------------------------------------------------------------  T(C): 36.6 (11-15-18 @ 15:00), Max: 36.8 (11-15-18 @ 01:18)  HR: 86 (11-15-18 @ 15:00) (61 - 96)  BP: 144/79 (11-15-18 @ 15:00) (136/70 - 176/64)  RR: 18 (11-15-18 @ 15:00) (16 - 18)  SpO2: 93% (11-15-18 @ 15:00) (92% - 98%)  Wt(kg): --  Height (cm): 172.72 (11-15-18 @ 08:05)  Weight (kg): 60.5 (11-14-18 @ 18:08)  BMI (kg/m2): 20.3 (11-15-18 @ 08:05)  BSA (m2): 1.72 (11-15-18 @ 08:05)      11-15-18 @ 07:01  -  11-15-18 @ 16:50  --------------------------------------------------------  IN: 400 mL / OUT: 900 mL / NET: -500 mL      Physical Exam:  	Gen: NAD  	HEENT:, supple neck  	Pulm: CTA B/L  	CV: RRR, S1S2; no rub  	Back: left flank  tenderness  	Abd: +BS, soft, nontender/nondistended  	: No suprapubic tenderness  	UE: Warm, no edema; no asterixis  	LE: Warm,  no edema  	Neuro: awake and alert  	Vascular access: LUE AVF, aneurysmal, thrill+ bruit+.. R AVF  bruit +    LABS/STUDIES  --------------------------------------------------------------------------------              10.0   5.47  >-----------<  122      [11-15-18 @ 08:01]              31.1     132  |  94  |  49  ----------------------------<  85      [11-15-18 @ 08:01]  5.4   |  22  |  9.06        Ca     9.2     [11-15-18 @ 08:01]      Mg     2.4     [11-15-18 @ 08:01]      Phos  6.0     [11-15-18 @ 08:01]    TPro  7.6  /  Alb  3.1  /  TBili  1.2  /  DBili  x   /  AST  24  /  ALT  5   /  AlkPhos  62  [11-15-18 @ 08:01]    PT/INR: PT 74.4 , INR 6.17       [11-15-18 @ 08:01]  PTT: 56.9       [11-15-18 @ 08:01]          [11-14-18 @ 17:18]    Creatinine Trend:  SCr 9.06 [11-15 @ 08:01]  SCr 7.23 [11-14 @ 06:40]        Iron 30, TIBC 147, %sat --      [02-12-18 @ 06:50]  Ferritin 1670      [02-11-18 @ 06:10]  TSH 4.49      [02-10-18 @ 14:22] [facility] DIVISION OF KIDNEY DISEASES AND HYPERTENSION -HEMODIALYSIS NOTE    Pt seen and examined. Resting comfortably in bed.    PAST HISTORY  --------------------------------------------------------------------------------  No significant changes to PMH, PSH, FHx, SHx, unless otherwise noted    ALLERGIES & MEDICATIONS  --------------------------------------------------------------------------------  Allergies    No Known Allergies    Intolerances      Standing Inpatient Medications  amLODIPine   Tablet 10 milliGRAM(s) Oral daily  atorvastatin 40 milliGRAM(s) Oral at bedtime  docusate sodium 100 milliGRAM(s) Oral daily  ferrous    sulfate 325 milliGRAM(s) Oral daily  folic acid 1 milliGRAM(s) Oral daily  hydrALAZINE 10 milliGRAM(s) Oral three times a day  influenza   Vaccine 0.5 milliLiter(s) IntraMuscular once  metoprolol tartrate 25 milliGRAM(s) Oral two times a day  senna 2 Tablet(s) Oral at bedtime  sevelamer hydrochloride 1600 milliGRAM(s) Oral every 12 hours    PRN Inpatient Medications  oxyCODONE    5 mG/acetaminophen 325 mG 1 Tablet(s) Oral every 6 hours PRN      REVIEW OF SYSTEMS  --------------------------------------------------------------------------------  unable to obtain      VITALS/PHYSICAL EXAM  --------------------------------------------------------------------------------  T(C): 36.6 (11-15-18 @ 15:00), Max: 36.8 (11-15-18 @ 01:18)  HR: 86 (11-15-18 @ 15:00) (61 - 96)  BP: 144/79 (11-15-18 @ 15:00) (136/70 - 176/64)  RR: 18 (11-15-18 @ 15:00) (16 - 18)  SpO2: 93% (11-15-18 @ 15:00) (92% - 98%)  Wt(kg): --  Height (cm): 172.72 (11-15-18 @ 08:05)  Weight (kg): 60.5 (11-14-18 @ 18:08)  BMI (kg/m2): 20.3 (11-15-18 @ 08:05)  BSA (m2): 1.72 (11-15-18 @ 08:05)      11-15-18 @ 07:01  -  11-15-18 @ 16:50  --------------------------------------------------------  IN: 400 mL / OUT: 900 mL / NET: -500 mL      Physical Exam:  	Gen: NAD  	HEENT:, supple neck  	Pulm: CTA B/L  	CV: RRR, S1S2; no rub  	Back: left flank  tenderness  	Abd: +BS, soft, nontender/nondistended  	UE: no asterixis  	LE: Warm,  no edema  	Neuro: awake and alert  	Vascular access: LUE AVF, aneurysmal, thrill+ bruit+.. R AVF  bruit +    LABS/STUDIES  --------------------------------------------------------------------------------              10.0   5.47  >-----------<  122      [11-15-18 @ 08:01]              31.1     132  |  94  |  49  ----------------------------<  85      [11-15-18 @ 08:01]  5.4   |  22  |  9.06        Ca     9.2     [11-15-18 @ 08:01]      Mg     2.4     [11-15-18 @ 08:01]      Phos  6.0     [11-15-18 @ 08:01]    TPro  7.6  /  Alb  3.1  /  TBili  1.2  /  DBili  x   /  AST  24  /  ALT  5   /  AlkPhos  62  [11-15-18 @ 08:01]    PT/INR: PT 74.4 , INR 6.17       [11-15-18 @ 08:01]  PTT: 56.9       [11-15-18 @ 08:01]          [11-14-18 @ 17:18]    Creatinine Trend:  SCr 9.06 [11-15 @ 08:01]  SCr 7.23 [11-14 @ 06:40]        Iron 30, TIBC 147, %sat --      [02-12-18 @ 06:50]  Ferritin 1670      [02-11-18 @ 06:10]  TSH 4.49      [02-10-18 @ 14:22]

## 2018-11-15 NOTE — PROGRESS NOTE ADULT - ASSESSMENT
74 yo male with mechanical mitral valve on coumadin, ESRD and cirrhosis with ascites, found to have left psoas RP hematoma without signs of active extravasation on CT angio. Hemodynamically stable, H/H stable. No neurologic symptoms.    RECS:  -no surgical intervention  -wound consult cardiology for management of supratherapeutic INR in setting of mechanical mitral valve  -trend H/H  -consult IR if patient develops hemodynamic instability or H/H decreases  -please reconsult B team if additional concerns arise, [id number]    Patient discussed with Dr. Glass

## 2018-11-15 NOTE — CONSULT NOTE ADULT - ASSESSMENT
72 yo male with mechanical valve on coumadin, ESRD and cirrhosis with ascites presented with 1 day of acute back pain, found to have enlarged left psoas muscle concerning for RP hematoma (without active extravasation per radiology), and elevated INR (4.7). Patient is hemodynamically stable with H/H 9.2/29 (baseline HGB 7-8) without any motor/sensory deficit in the lower extremities and palpable distal pulses    Plan:   No surgical intervention indicated  Would recommend cardiology consult to manage supra therapeutic INR in context of mechanical valve   Would trend serial H/H and IR consult if concern for active bleed and transfuse as needed   Renal for ESRD and dialysis need  Surgery will follow, discussed with Dr. Martinez
This is a 72 y/o M with a PMHx of HTN, MVR on coumadin,  ESRD on HD MWF admitted for RP hematoma.
72 yo Cantonese speaking man with history of mechanical valve, AFib on coumadin, HTN, ESRD (MWF, most recent dialysis on Monday) and cirrhosis presented with acute onset of lower left back pain found to have a supratherapeutic INR and retroperitoneal bleed      Problem/Plan - 1:  ·  Problem: Retroperitoneal hematoma.  Given vitamin K for supratherapeutic INR, now subtherapeutic INR  Plan: -INR subtherapeutic  - Abd CT:  Asymmetric enlargement of the left psoas muscle, new compared to prior examination. Findings are concerning for retroperitoneal hematoma.  - Surgery consulted. No surgical intervention indicated. Will ask Surgery if able to restart anitocoagulation  -Trend CBC q12hrs, and daily INR, transfuse as needed to keep Hgb > 7.       Problem/Plan - 2:  ·  Problem: Atrial fibrillation.  Will need anticoagulation when able  - Will c/w lopressor 25mg BID for now.        Problem/Plan - 3:  ·  Problem: Troponin level elevated.  Plan: - Most likely 2/2 CKD.  Continue supportive management.  - EKG with Afib with incomplete RBBB.

## 2018-11-15 NOTE — PROGRESS NOTE ADULT - PROBLEM SELECTOR PLAN 2
- Received HD M,W,F  - Per renal, will dialyze today.  - Okay to use L arm for blood draws, as it has non-functioning fistula. No draws on fistula itself, per Dr. Broussard.

## 2018-11-15 NOTE — CONSULT NOTE ADULT - SUBJECTIVE AND OBJECTIVE BOX
Cardiology/Vascular Medicine Inpatient Consultation Note    HISTORY OF PRESENT ILLNESS:  Note: attempted to use  ID [id number], however pt hard of hearing. History obtained from son, patient and chart.     72 yo Cantonese speaking male with history of mechanical valve, AFib on coumadin, HTN, ESRD (MWF, most recent dialysis on Monday) and cirrhosis presented with acute onset of lower left back pain. Per patient and his son, the pain started last night when patient bent down to put on socks. Patient tried to sleep it off overnight, however patient requested to be taken to hospital this AM given severe lower back pain. Patient and son deny any external trauma or injuries. Patient never had similar pain before. Patient came to ER as the pain persisted. Patient normally ambulates without assistance, but given pain has not been able to ambulate Patient denies pain anywhere else, shortness breath, CP, palpitations or lightheadedness.  Patient and family don't recall when was the last time patient had his INR checked outpatient.     REVIEW OF SYSTEMS:  Limited as above, pt hard of hearing (14 Nov 2018 15:59)          Allergies    No Known Allergies    Intolerances    	    MEDICATIONS:  amLODIPine   Tablet 10 milliGRAM(s) Oral daily  hydrALAZINE 10 milliGRAM(s) Oral three times a day  metoprolol tartrate 25 milliGRAM(s) Oral two times a day            atorvastatin 40 milliGRAM(s) Oral at bedtime    ferrous    sulfate 325 milliGRAM(s) Oral daily  folic acid 1 milliGRAM(s) Oral daily  influenza   Vaccine 0.5 milliLiter(s) IntraMuscular once      PAST MEDICAL & SURGICAL HISTORY:  Cirrhosis  Hepatitis B  Dementia  Hypertension  ESRD (end stage renal disease) on dialysis  Atrial fibrillation  Mitral valve replaced: mechanical valve  A-V fistula      FAMILY HISTORY:  Maternal family history of cancer (Mother)      SOCIAL HISTORY:    [ ] Non-smoker  [ ] Smoker  [ ] Alcohol    REVIEW OF SYSTEMS:  CONSTITUTIONAL: No fever, weight loss, or fatigue  EYES: No eye pain, visual disturbances, or discharge  ENMT:  No difficulty hearing, tinnitus, vertigo; No sinus or throat pain  NECK: No pain or stiffness  RESPIRATORY: No cough, wheezing, chills or hemoptysis; No Shortness of Breath  CARDIOVASCULAR: No chest pain, palpitations, passing out, dizziness, or leg swelling  GASTROINTESTINAL: No abdominal or epigastric pain. No nausea, vomiting, or hematemesis; No diarrhea or constipation. No melena or hematochezia.  GENITOURINARY: No dysuria, frequency, hematuria, or incontinence  NEUROLOGICAL: No headaches, memory loss, loss of strength, numbness, or tremors  SKIN: No itching, burning, rashes, or lesions   LYMPH Nodes: No enlarged glands  ENDOCRINE: No heat or cold intolerance; No hair loss  MUSCULOSKELETAL: No joint pain or swelling; No muscle, back, or extremity pain  PSYCHIATRIC: No depression, anxiety, mood swings, or difficulty sleeping  HEME/LYMPH: No easy bruising, or bleeding gums  ALLERY AND IMMUNOLOGIC: No hives or eczema	    [ ] All others negative	  [ ] Unable to obtain    PHYSICAL EXAM:  T(C): 36.6 (11-15-18 @ 06:26), Max: 37 (11-14-18 @ 14:42)  HR: 85 (11-15-18 @ 06:26) (65 - 96)  BP: 156/73 (11-15-18 @ 06:26) (148/67 - 188/91)  RR: 17 (11-15-18 @ 06:26) (17 - 19)  SpO2: 95% (11-15-18 @ 06:26) (92% - 98%)  < from: CT Angio Abdomen and Pelvis w/ IV Cont (11.14.18 @ 07:48) >    EXAM:  CT ANGIO ABD PELV (W)AW IC        PROCEDURE DATE:  Nov 14 2018         INTERPRETATION:  CLINICAL INFORMATION: Severe back pain on   anticoagulation. Evaluate for abdominal aortic aneurysm.    COMPARISON: CT abdomen and pelvis from 7/1/2014. CT chest from 2/10/2018.    PROCEDURE:   CT Angiography of the Abdomen and Pelvis.  Arterial phase images were acquired.  Intravenous contrast: 90 ml Omnipaque 350. 10 ml discarded.  Oral contrast: None.  Sagittal and coronal reformats were performed as well as 3D (MIP)   reconstructions.    FINDINGS:    LOWER CHEST: Status post median sternotomy. Cardiomegaly with prior   atrial enlargement. Status post mitral valve replacement. Coronary artery   calcifications.    Consolidative opacities in the right lower lobe and bilateral lower lobe   patchy opacities, right greater than left likely atelectasis with   superimposed pneumonia.    Small right and trace left pleural effusion.    LIVER: Cirrhosis with few stable scattered hepatic cysts in the right   hepatic lobe.  BILE DUCTS: Normal caliber.  GALLBLADDER: Cholelithiasis.  SPLEEN: Within normal limits.  PANCREAS: 1.6 cm hypodense lesion in the neck of the pancreas. A 6 mm   hypodense lesion in the body of the pancreas is unchanged since prior   examination, and likely low-grade cystic pancreatic neoplasm such as IPMN.  ADRENALS: Within normal limits.  KIDNEYS/URETERS: Bilateral atrophic kidneys. Right renal cysts. Unchanged   several left intrarenal calculi. No hydronephrosis.    BLADDER: Underdistended with mildly thickened wall.  REPRODUCTIVE ORGANS: Prostate is normal in size.    BOWEL: No bowel obstruction. Appendix nonvisualized.  PERITONEUM: Large volume abdominal and pelvic ascites.  VESSELS:  Atherosclerotic changes. Abdominal aorta is normal in caliber.   The celiac axis, SMA and CARTER are patent. RETROPERITONEUM: No   lymphadenopathy.    ABDOMINAL WALL: Within normal limits.  BONES: Degenerative changes of the spine.    IMPRESSION:     *  No evidence of abdominal aorticaneurysm, dissection, or rupture.  *  Asymmetric enlargement of the left psoas muscle, new compared to prior   examination. Findings are concerning for retroperitoneal hematoma.  *  Consolidative opacities in the right lower lobe and patchy  opacities   in the bilateral lower lobe likely pneumonia with atelectasis.  *  Small right and trace left pleural effusion.  *  Large volume abdominal pelvic ascites.  *  A 1.6 cm hypodense lesion in the neck of the pancreas. Consider   further evaluation with MR in nonemergent basis.    Findings discussed with EILEEN Davidson at 9:32 AM on 11/14/2018.      RAISSA RIZZO M.D., RADIOLOGY RESIDENT  This document has been electronically signed.  LUIS RAMOS M.D., ATTENDING RADIOLOGIST  This document has been electronically signed. Nov 14 2018  9:33AM        < from: Transthoracic Echocardiogram (02.15.18 @ 10:34) >  Patient name: FILI RAMOS  YOB: 1945   Age: 72 (M)   MR#: [id number]  Study Date: 2/15/2018  Location: [facility] Sonographer: Sarah Reno RDCS  Study quality: Technically good  Referring Physician: Gayathri Puckett MD  Blood Pressure: 124/56 mmHg  Height: 165 cm  Weight: 63 kg  BSA: 1.7 m2  ------------------------------------------------------------------------  PROCEDURE: Transthoracic echocardiogram with 2-D, M-Mode  and complete spectral and color flow Doppler.  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 Minimal mitral regurgitation. Mean transmitral  valve gradient equals 10 mm Hg, which is elevated even in  the setting of a prosthetic valve.  Aortic Root: Normal aortic root.  Aortic Valve: Calcified trileaflet aortic valve with normal  opening. Mild aortic regurgitation.  Left Atrium: Severely dilated left atrium.  LA volume index  = 154 cc/m2.  Left Ventricle: Normal left ventricular systolic function.  No segmental wall motion abnormalities. Normal left  ventricular internal dimensions and wall thicknesses.  Right Heart: Severe right atrial enlargement. Right  ventricular enlargement with decreased right ventricular  systolic function. Normal tricuspid valve.  Moderate  tricuspid regurgitation. Normal pulmonic valve. Minimal  pulmonic regurgitation.  Pericardium/PleuraNormal pericardium with no pericardial  effusion.  Hemodynamic: Estimated right ventricular systolic pressure  equals 69 mm Hg, assuming right atrial pressure equals 10  mm Hg, consistent with severe pulmonary hypertension.  ------------------------------------------------------------------------  CONCLUSIONS:  1. Mechanical prosthetic mitral valve replacement. Minimal  mitral regurgitation. Mean transmitral valve gradient  equals 10 mm Hg, which is elevated even in the setting of a  prosthetic valve.  2. Calcified trileaflet aortic valve with normal opening.  Mild aortic regurgitation.  3. Severely dilated left atrium.  LA volume index = 154  cc/m2.  4. Normal left ventricular internal dimensions and wall  thicknesses.  5. Normal left ventricular systolic function. No segmental  wall motion abnormalities.  6. Severe right atrial enlargement.  7. Right ventricular enlargement with decreased right  ventricular systolic function.  8. Estimated right ventricular systolic pressure equals 69  mm Hg, assuming right atrial pressure equals 10 mm Hg,  consistent with severe pulmonary hypertension.  Consider MATTHEW for further evaluation of the prosthetic  mitral valve, if clinically indicated.  ------------------------------------------------------------------------  Confirmed on  2/15/2018 - 13:19:36 by Kaleb Luis M.D.  ------------------------------------------------------------------------    < end of copied text >              < end of copied text >  Wt(kg): --  I&O's Summary      Appearance: Normal	  HEENT:   Normal oral mucosa, PERRL, EOMI	  Lymphatic: No lymphadenopathy  Cardiovascular: Normal S1 S2, No JVD, No murmurs, No edema  Respiratory: Lungs clear to auscultation	  Psychiatry: A & O x 3, Mood & affect appropriate  Gastrointestinal:  Soft, Non-tender, + BS	  Skin: No rashes, No ecchymoses, No cyanosis	  Neurologic: Non-focal  Extremities: Normal range of motion, No clubbing, cyanosis or edema  Vascular: Peripheral pulses palpable 2+ bilaterally      LABS:	 	                          10.1   4.99  )-----------( 107      ( 14 Nov 2018 23:56 )             31.2     11-14    135  |  94<L>  |  35<H>  ----------------------------<  81  5.0   |  24  |  7.23<H>    Ca    9.2      14 Nov 2018 06:40    TPro  7.8  /  Alb  3.4  /  TBili  1.4<H>  /  DBili  x   /  AST  21  /  ALT  5   /  AlkPhos  62  11-14 Cardiology/Vascular Medicine Inpatient Consultation Note    HISTORY OF PRESENT ILLNESS:  Note: attempted to use  ID [id number], however pt hard of hearing. History obtained from son, patient and chart.     74 yo Cantonese speaking man with history of mechanical valve, AFib on coumadin, HTN, ESRD (MWF, most recent dialysis on Monday) and cirrhosis presented with acute onset of lower left back pain.     Noted to have retroperitoneal bleed in setting of supratherapeutic INR.  Given vitamin K 2.5 mg Po x 1.  INR now subtherapeutic.  Will likely need to restart anticoagulation if hemostasis is assured.    Allergies  No Known Allergies    MEDICATIONS:  amLODIPine   Tablet 10 milliGRAM(s) Oral daily  hydrALAZINE 10 milliGRAM(s) Oral three times a day  metoprolol tartrate 25 milliGRAM(s) Oral two times a day  atorvastatin 40 milliGRAM(s) Oral at bedtime  ferrous    sulfate 325 milliGRAM(s) Oral daily  folic acid 1 milliGRAM(s) Oral daily  influenza   Vaccine 0.5 milliLiter(s) IntraMuscular once      PAST MEDICAL & SURGICAL HISTORY:  Cirrhosis  Hepatitis B  Dementia  Hypertension  ESRD (end stage renal disease) on dialysis  Atrial fibrillation  Mitral valve replaced: mechanical valve  A-V fistula      FAMILY HISTORY:  Maternal family history of cancer (Mother)    SOCIAL HISTORY:    [place]    REVIEW OF SYSTEMS:  [ ] Unable to obtain    PHYSICAL EXAM:  T(C): 36.6 (11-15-18 @ 06:26), Max: 37 (11-14-18 @ 14:42)  HR: 85 (11-15-18 @ 06:26) (65 - 96)  BP: 156/73 (11-15-18 @ 06:26) (148/67 - 188/91)  RR: 17 (11-15-18 @ 06:26) (17 - 19)  SpO2: 95% (11-15-18 @ 06:26) (92% - 98%)  Wt(kg): --  I&O's Summary      Appearance: Chronically-ill appearing	  HEENT:  Poor dentition  Cardiovascular: Normal S1 S2, No JVD, 2/6 Sm, +click  Respiratory: Lungs clear to auscultation	  Psychiatry: Awake, but appears confused, Cantonese speaking  Gastrointestinal:  Soft, Non-tender, + BS	  Skin: No rashes, No ecchymoses, No cyanosis	  Neurologic: Non-focal      LABS:	 	                          10.1   4.99  )-----------( 107      ( 14 Nov 2018 23:56 )             31.2     11-14    135  |  94<L>  |  35<H>  ----------------------------<  81  5.0   |  24  |  7.23<H>    Ca    9.2      14 Nov 2018 06:40    TPro  7.8  /  Alb  3.4  /  TBili  1.4<H>  /  DBili  x   /  AST  21  /  ALT  5   /  AlkPhos  62  11-14      < from: CT Angio Abdomen and Pelvis w/ IV Cont (11.14.18 @ 07:48) >    EXAM:  CT ANGIO ABD PELV (W)AW IC        PROCEDURE DATE:  Nov 14 2018         INTERPRETATION:  CLINICAL INFORMATION: Severe back pain on   anticoagulation. Evaluate for abdominal aortic aneurysm.    COMPARISON: CT abdomen and pelvis from 7/1/2014. CT chest from 2/10/2018.    PROCEDURE:   CT Angiography of the Abdomen and Pelvis.  Arterial phase images were acquired.  Intravenous contrast: 90 ml Omnipaque 350. 10 ml discarded.  Oral contrast: None.  Sagittal and coronal reformats were performed as well as 3D (MIP)   reconstructions.    FINDINGS:    LOWER CHEST: Status post median sternotomy. Cardiomegaly with prior   atrial enlargement. Status post mitral valve replacement. Coronary artery   calcifications.    Consolidative opacities in the right lower lobe and bilateral lower lobe   patchy opacities, right greater than left likely atelectasis with   superimposed pneumonia.    Small right and trace left pleural effusion.    LIVER: Cirrhosis with few stable scattered hepatic cysts in the right   hepatic lobe.  BILE DUCTS: Normal caliber.  GALLBLADDER: Cholelithiasis.  SPLEEN: Within normal limits.  PANCREAS: 1.6 cm hypodense lesion in the neck of the pancreas. A 6 mm   hypodense lesion in the body of the pancreas is unchanged since prior   examination, and likely low-grade cystic pancreatic neoplasm such as IPMN.  ADRENALS: Within normal limits.  KIDNEYS/URETERS: Bilateral atrophic kidneys. Right renal cysts. Unchanged   several left intrarenal calculi. No hydronephrosis.    BLADDER: Underdistended with mildly thickened wall.  REPRODUCTIVE ORGANS: Prostate is normal in size.    BOWEL: No bowel obstruction. Appendix nonvisualized.  PERITONEUM: Large volume abdominal and pelvic ascites.  VESSELS:  Atherosclerotic changes. Abdominal aorta is normal in caliber.   The celiac axis, SMA and CARTER are patent. RETROPERITONEUM: No   lymphadenopathy.    ABDOMINAL WALL: Within normal limits.  BONES: Degenerative changes of the spine.    IMPRESSION:     *  No evidence of abdominal aorticaneurysm, dissection, or rupture.  *  Asymmetric enlargement of the left psoas muscle, new compared to prior   examination. Findings are concerning for retroperitoneal hematoma.  *  Consolidative opacities in the right lower lobe and patchy  opacities   in the bilateral lower lobe likely pneumonia with atelectasis.  *  Small right and trace left pleural effusion.  *  Large volume abdominal pelvic ascites.  *  A 1.6 cm hypodense lesion in the neck of the pancreas. Consider   further evaluation with MR in nonemergent basis.    Findings discussed with EILEEN Davidson at 9:32 AM on 11/14/2018.      RAISSA RIZZO M.D., RADIOLOGY RESIDENT  This document has been electronically signed.  LUIS RAMOS M.D., ATTENDING RADIOLOGIST  This document has been electronically signed. Nov 14 2018  9:33AM        < from: Transthoracic Echocardiogram (02.15.18 @ 10:34) >  Patient name: FILI RAMOS  YOB: 1945   Age: 72 (M)   MR#: [id number]  Study Date: 2/15/2018  Location: [facility] Sonographer: Sarah Reno RDCS  Study quality: Technically good  Referring Physician: Gayathri Puckett MD  Blood Pressure: 124/56 mmHg  Height: 165 cm  Weight: 63 kg  BSA: 1.7 m2  ------------------------------------------------------------------------  PROCEDURE: Transthoracic echocardiogram with 2-D, M-Mode  and complete spectral and color flow Doppler.  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 Minimal mitral regurgitation. Mean transmitral  valve gradient equals 10 mm Hg, which is elevated even in  the setting of a prosthetic valve.  Aortic Root: Normal aortic root.  Aortic Valve: Calcified trileaflet aortic valve with normal  opening. Mild aortic regurgitation.  Left Atrium: Severely dilated left atrium.  LA volume index  = 154 cc/m2.  Left Ventricle: Normal left ventricular systolic function.  No segmental wall motion abnormalities. Normal left  ventricular internal dimensions and wall thicknesses.  Right Heart: Severe right atrial enlargement. Right  ventricular enlargement with decreased right ventricular  systolic function. Normal tricuspid valve.  Moderate  tricuspid regurgitation. Normal pulmonic valve. Minimal  pulmonic regurgitation.  Pericardium/PleuraNormal pericardium with no pericardial  effusion.  Hemodynamic: Estimated right ventricular systolic pressure  equals 69 mm Hg, assuming right atrial pressure equals 10  mm Hg, consistent with severe pulmonary hypertension.  ------------------------------------------------------------------------  CONCLUSIONS:  1. Mechanical prosthetic mitral valve replacement. Minimal  mitral regurgitation. Mean transmitral valve gradient  equals 10 mm Hg, which is elevated even in the setting of a  prosthetic valve.  2. Calcified trileaflet aortic valve with normal opening.  Mild aortic regurgitation.  3. Severely dilated left atrium.  LA volume index = 154  cc/m2.  4. Normal left ventricular internal dimensions and wall  thicknesses.  5. Normal left ventricular systolic function. No segmental  wall motion abnormalities.  6. Severe right atrial enlargement.  7. Right ventricular enlargement with decreased right  ventricular systolic function.  8. Estimated right ventricular systolic pressure equals 69  mm Hg, assuming right atrial pressure equals 10 mm Hg,  consistent with severe pulmonary hypertension.  Consider MATTHEW for further evaluation of the prosthetic  mitral valve, if clinically indicated.  ------------------------------------------------------------------------  Confirmed on  2/15/2018 - 13:19:36 by Kaleb Luis M.D.  ------------------------------------------------------------------------    < end of copied text >              < end of copied text >

## 2018-11-15 NOTE — PHYSICAL THERAPY INITIAL EVALUATION ADULT - ADDITIONAL COMMENTS
Pt. left in bed post-PT in NAD with all lines/tubes intact & call bell within reach.  Son & his girlfriend at bedside.  RN Rossana ervin.

## 2018-11-15 NOTE — PROGRESS NOTE ADULT - PROBLEM SELECTOR PLAN 1
ESRD on HD MWF. Pt. last had HD on 11/11. Pt. clinically stable.  arrange for maintenance HD today. Monitor BP and labs. ESRD on HD MWF. Pt. last had HD on 11/11. Pt. clinically stable. maintenance HD today. Monitor BP and labs.

## 2018-11-15 NOTE — CHART NOTE - NSCHARTNOTEFT_GEN_A_CORE
Notified by nurse that pt is having severe back pain and that he is unable to sleep. Pt seen and examined at bedside. He reports that his pain is severe, 10/10 and worsens with movement. On physical exam, he had diffuse tenderness on the lower back. However, no skin discoloration was noted given the finding of RP hematoma on CT. He endorsed that the morphine that he was given at 7:00 am only partially relieved the pain and that he would like something stronger. His BP was stable at 148/67 and the rest of the vitals were stable as well. He was given one time dose of 0.5 mg ID Dilaudid. He did not complain of pain afterwards and was resting comfortably. Will reassess in the morning. Notified by nurse that pt is having severe back pain and that he is unable to sleep. Pt seen and examined at bedside. He reports that his pain is severe, 10/10 and worsens with movement. On physical exam, he had diffuse tenderness on the lower back. However, no skin discoloration was noted given the finding of RP hematoma on CT. He endorsed that the morphine that he was given at 7:00 am only partially relieved the pain and that he would like something stronger. His BP was stable at 148/67 and the rest of the vitals were stable as well. He was given one time dose of 0.5 mg IV Dilaudid. He did not complain of pain afterwards and was resting comfortably. Will reassess in the morning.

## 2018-11-16 PROBLEM — B19.10 UNSPECIFIED VIRAL HEPATITIS B WITHOUT HEPATIC COMA: Chronic | Status: ACTIVE | Noted: 2018-11-14

## 2018-11-16 PROBLEM — K74.60 UNSPECIFIED CIRRHOSIS OF LIVER: Chronic | Status: ACTIVE | Noted: 2018-11-14

## 2018-11-16 PROBLEM — F03.90 UNSPECIFIED DEMENTIA WITHOUT BEHAVIORAL DISTURBANCE: Chronic | Status: ACTIVE | Noted: 2018-11-14

## 2018-11-16 LAB
APTT BLD: 105.2 SEC — HIGH (ref 27.5–36.3)
APTT BLD: 41.3 SEC — HIGH (ref 27.5–36.3)
BACTERIA NPH CULT: SIGNIFICANT CHANGE UP
BASOPHILS # BLD AUTO: 0.02 K/UL — SIGNIFICANT CHANGE UP (ref 0–0.2)
BASOPHILS NFR BLD AUTO: 0.4 % — SIGNIFICANT CHANGE UP (ref 0–2)
BLD GP AB SCN SERPL QL: POSITIVE — SIGNIFICANT CHANGE UP
BUN SERPL-MCNC: 27 MG/DL — HIGH (ref 7–23)
CALCIUM SERPL-MCNC: 9.1 MG/DL — SIGNIFICANT CHANGE UP (ref 8.4–10.5)
CHLORIDE SERPL-SCNC: 97 MMOL/L — LOW (ref 98–107)
CO2 SERPL-SCNC: 25 MMOL/L — SIGNIFICANT CHANGE UP (ref 22–31)
CREAT SERPL-MCNC: 6.25 MG/DL — HIGH (ref 0.5–1.3)
DAT POLY-SP REAG RBC QL: NEGATIVE — SIGNIFICANT CHANGE UP
EOSINOPHIL # BLD AUTO: 0.17 K/UL — SIGNIFICANT CHANGE UP (ref 0–0.5)
EOSINOPHIL NFR BLD AUTO: 3.8 % — SIGNIFICANT CHANGE UP (ref 0–6)
GLUCOSE SERPL-MCNC: 94 MG/DL — SIGNIFICANT CHANGE UP (ref 70–99)
HCT VFR BLD CALC: 30 % — LOW (ref 39–50)
HCT VFR BLD CALC: 30.3 % — LOW (ref 39–50)
HGB BLD-MCNC: 9.6 G/DL — LOW (ref 13–17)
HGB BLD-MCNC: 9.8 G/DL — LOW (ref 13–17)
IMM GRANULOCYTES # BLD AUTO: 0.03 # — SIGNIFICANT CHANGE UP
IMM GRANULOCYTES NFR BLD AUTO: 0.7 % — SIGNIFICANT CHANGE UP (ref 0–1.5)
INR BLD: 1.8 — HIGH (ref 0.88–1.17)
LYMPHOCYTES # BLD AUTO: 0.27 K/UL — LOW (ref 1–3.3)
LYMPHOCYTES # BLD AUTO: 6 % — LOW (ref 13–44)
MAGNESIUM SERPL-MCNC: 2.2 MG/DL — SIGNIFICANT CHANGE UP (ref 1.6–2.6)
MCHC RBC-ENTMCNC: 30.4 PG — SIGNIFICANT CHANGE UP (ref 27–34)
MCHC RBC-ENTMCNC: 30.5 PG — SIGNIFICANT CHANGE UP (ref 27–34)
MCHC RBC-ENTMCNC: 32 % — SIGNIFICANT CHANGE UP (ref 32–36)
MCHC RBC-ENTMCNC: 32.3 % — SIGNIFICANT CHANGE UP (ref 32–36)
MCV RBC AUTO: 94.4 FL — SIGNIFICANT CHANGE UP (ref 80–100)
MCV RBC AUTO: 94.9 FL — SIGNIFICANT CHANGE UP (ref 80–100)
MONOCYTES # BLD AUTO: 0.44 K/UL — SIGNIFICANT CHANGE UP (ref 0–0.9)
MONOCYTES NFR BLD AUTO: 9.8 % — SIGNIFICANT CHANGE UP (ref 2–14)
NEUTROPHILS # BLD AUTO: 3.54 K/UL — SIGNIFICANT CHANGE UP (ref 1.8–7.4)
NEUTROPHILS NFR BLD AUTO: 79.3 % — HIGH (ref 43–77)
NRBC # FLD: 0 — SIGNIFICANT CHANGE UP
NRBC # FLD: 0 — SIGNIFICANT CHANGE UP
PHOSPHATE SERPL-MCNC: 3.9 MG/DL — SIGNIFICANT CHANGE UP (ref 2.5–4.5)
PLATELET # BLD AUTO: 121 K/UL — LOW (ref 150–400)
PLATELET # BLD AUTO: 126 K/UL — LOW (ref 150–400)
PMV BLD: 10.3 FL — SIGNIFICANT CHANGE UP (ref 7–13)
PMV BLD: 9.7 FL — SIGNIFICANT CHANGE UP (ref 7–13)
POTASSIUM SERPL-MCNC: 4.2 MMOL/L — SIGNIFICANT CHANGE UP (ref 3.5–5.3)
POTASSIUM SERPL-SCNC: 4.2 MMOL/L — SIGNIFICANT CHANGE UP (ref 3.5–5.3)
PROTHROM AB SERPL-ACNC: 20.3 SEC — HIGH (ref 9.8–13.1)
RBC # BLD: 3.16 M/UL — LOW (ref 4.2–5.8)
RBC # BLD: 3.21 M/UL — LOW (ref 4.2–5.8)
RBC # FLD: 15.6 % — HIGH (ref 10.3–14.5)
RBC # FLD: 15.8 % — HIGH (ref 10.3–14.5)
RH IG SCN BLD-IMP: POSITIVE — SIGNIFICANT CHANGE UP
SODIUM SERPL-SCNC: 134 MMOL/L — LOW (ref 135–145)
SPECIMEN SOURCE: SIGNIFICANT CHANGE UP
WBC # BLD: 4.47 K/UL — SIGNIFICANT CHANGE UP (ref 3.8–10.5)
WBC # BLD: 4.56 K/UL — SIGNIFICANT CHANGE UP (ref 3.8–10.5)
WBC # FLD AUTO: 4.47 K/UL — SIGNIFICANT CHANGE UP (ref 3.8–10.5)
WBC # FLD AUTO: 4.56 K/UL — SIGNIFICANT CHANGE UP (ref 3.8–10.5)

## 2018-11-16 PROCEDURE — 99233 SBSQ HOSP IP/OBS HIGH 50: CPT | Mod: GC

## 2018-11-16 PROCEDURE — 99232 SBSQ HOSP IP/OBS MODERATE 35: CPT | Mod: GC

## 2018-11-16 RX ORDER — HEPARIN SODIUM 5000 [USP'U]/ML
2500 INJECTION INTRAVENOUS; SUBCUTANEOUS EVERY 6 HOURS
Qty: 0 | Refills: 0 | Status: DISCONTINUED | OUTPATIENT
Start: 2018-11-16 | End: 2018-11-19

## 2018-11-16 RX ORDER — HEPARIN SODIUM 5000 [USP'U]/ML
5000 INJECTION INTRAVENOUS; SUBCUTANEOUS EVERY 6 HOURS
Qty: 0 | Refills: 0 | Status: DISCONTINUED | OUTPATIENT
Start: 2018-11-16 | End: 2018-11-19

## 2018-11-16 RX ORDER — HEPARIN SODIUM 5000 [USP'U]/ML
INJECTION INTRAVENOUS; SUBCUTANEOUS
Qty: 25000 | Refills: 0 | Status: DISCONTINUED | OUTPATIENT
Start: 2018-11-16 | End: 2018-11-19

## 2018-11-16 RX ADMIN — HEPARIN SODIUM 1000 UNIT(S)/HR: 5000 INJECTION INTRAVENOUS; SUBCUTANEOUS at 19:49

## 2018-11-16 RX ADMIN — HEPARIN SODIUM 1100 UNIT(S)/HR: 5000 INJECTION INTRAVENOUS; SUBCUTANEOUS at 12:17

## 2018-11-16 NOTE — PROGRESS NOTE ADULT - ASSESSMENT
74 yo Cantonese speaking male with history of mechanical valve, AFib on coumadin, HTN, ESRD (MWF, most recent dialysis on Monday) and cirrhosis presented with acute onset of lower left back pain found to have a supratherapeutic INR and retroperitoneal bleed

## 2018-11-16 NOTE — PROGRESS NOTE ADULT - PROBLEM SELECTOR PLAN 2
- Received HD M,W,F  - Okay to use L arm for blood draws, as it has non-functioning fistula. No draws on fistula itself, per Dr. Broussard.

## 2018-11-16 NOTE — PROGRESS NOTE ADULT - PROBLEM SELECTOR PLAN 1
-INR 6.17-->4.59.  - Abd CT:  Asymmetric enlargement of the left psoas muscle, new compared to prior examination. Findings are concerning for retroperitoneal hematoma.  - Surgery consulted. No surgical intervention indicated, appreciate recs.  - Holding coumadin, s/p vitamin K reversal.  -Trend CBC q12hrs, and daily INR  - Keep transfusion goal >7  - If ongoing active bleeding, will consult IR  - Pain control w/ percocet 5mg q6 for now -INR down to 1.8.  - Abd CT:  Asymmetric enlargement of the left psoas muscle, new compared to prior examination. Findings are concerning for retroperitoneal hematoma.  - Surgery consulted. No surgical intervention indicated, appreciate recs.  - Holding coumadin, s/p vitamin K reversal. Now subtherapeutic. Will start heparin gtt given mechanical MV vs. dosing coumadin in PM.  -Trend CBC q12hrs, and daily INR  - Keep transfusion goal >7  - If ongoing active bleeding, will consult IR  - Pain control w/ percocet 5mg q6 for now -INR down to 1.8. Dose 2.5mg coumadin now.  - Abd CT:  Asymmetric enlargement of the left psoas muscle, new compared to prior examination. Findings are concerning for retroperitoneal hematoma.  - Surgery consulted. No surgical intervention indicated, appreciate recs.  - s/p vitamin K reversal. Now subtherapeutic. Giving coumadin 2.5mg dose now,  Will start heparin gtt given mechanical MV pending surgery recs.  -Trend CBC q12hrs, and daily INR  - Keep transfusion goal >7  - If ongoing active bleeding, will consult IR  - Pain control w/ percocet 5mg q6 for now

## 2018-11-16 NOTE — PROGRESS NOTE ADULT - PROBLEM SELECTOR PLAN 1
ESRD on HD MWF. Pt. last had HD on 11/15 Pt. clinically stable. maintenance HD tomorrow. Monitor BP and labs.

## 2018-11-16 NOTE — PROGRESS NOTE ADULT - SUBJECTIVE AND OBJECTIVE BOX
[facility] DIVISION OF KIDNEY DISEASES AND HYPERTENSION -- FOLLOW UP NOTE  --------------------------------------------------------------------------------        24 hour events/subjective: no back pain when he does not move         PAST HISTORY  --------------------------------------------------------------------------------  No significant changes to PMH, PSH, FHx, SHx, unless otherwise noted    ALLERGIES & MEDICATIONS  --------------------------------------------------------------------------------  Allergies    No Known Allergies    Intolerances      Standing Inpatient Medications  amLODIPine   Tablet 10 milliGRAM(s) Oral daily  atorvastatin 40 milliGRAM(s) Oral at bedtime  docusate sodium 100 milliGRAM(s) Oral daily  ferrous    sulfate 325 milliGRAM(s) Oral daily  folic acid 1 milliGRAM(s) Oral daily  heparin  Infusion.  Unit(s)/Hr IV Continuous <Continuous>  hydrALAZINE 10 milliGRAM(s) Oral three times a day  influenza   Vaccine 0.5 milliLiter(s) IntraMuscular once  metoprolol tartrate 25 milliGRAM(s) Oral two times a day  senna 2 Tablet(s) Oral at bedtime  sevelamer hydrochloride 1600 milliGRAM(s) Oral every 12 hours  warfarin 2.5 milliGRAM(s) Oral once    PRN Inpatient Medications  heparin  Injectable 5000 Unit(s) IV Push every 6 hours PRN  heparin  Injectable 2500 Unit(s) IV Push every 6 hours PRN  oxyCODONE    5 mG/acetaminophen 325 mG 1 Tablet(s) Oral every 6 hours PRN      REVIEW OF SYSTEMS  --------------------------------------------------------------------------------  Gen: + weakness  Head/Eyes/Ears/Mouth: No headache  Respiratory: No dyspnea,   CV: No chest pain,   GI: No abdominal pain,  MSK: back pain   Neuro:  no seizures,  Psych: No anxiety  no fistula bleeding, anurysmal L AVF   All other systems were reviewed and are negative, except as noted.        VITALS/PHYSICAL EXAM  --------------------------------------------------------------------------------  T(C): 36.7 (11-16-18 @ 12:04), Max: 37.1 (11-16-18 @ 02:30)  HR: 85 (11-16-18 @ 12:04) (74 - 88)  BP: 128/57 (11-16-18 @ 12:04) (126/65 - 166/67)  RR: 20 (11-16-18 @ 12:04) (16 - 20)  SpO2: 100% (11-16-18 @ 12:04) (93% - 100%)  Wt(kg): --  Height (cm): 172.72 (11-15-18 @ 08:05)  Weight (kg): 60.5 (11-14-18 @ 18:08)  BMI (kg/m2): 20.3 (11-15-18 @ 08:05)  BSA (m2): 1.72 (11-15-18 @ 08:05)      11-15-18 @ 07:01  -  11-16-18 @ 07:00  --------------------------------------------------------  IN: 400 mL / OUT: 900 mL / NET: -500 mL      Physical Exam:          Gen: NAD,   	HEENT: supple neck, NO JVD   	Pulm: CTA B/L              CV: RRR, S1S2; no rub  	Abd: +BS, soft, nontender/nondistended  	UE:  no edema;   	LE:  no edema  	Neuro: No focal deficits,   	Psych: Normal affect and mood. vascular L anurysmal AVF. No bleeding./ R RC fistula + bruit           	>>> <<<    LABS/STUDIES  --------------------------------------------------------------------------------              9.8    4.47  >-----------<  126      [11-16-18 @ 07:55]              30.3     134  |  97  |  27  ----------------------------<  94      [11-16-18 @ 07:55]  4.2   |  25  |  6.25        Ca     9.1     [11-16-18 @ 07:55]      Mg     2.2     [11-16-18 @ 07:55]      Phos  3.9     [11-16-18 @ 07:55]    TPro  7.6  /  Alb  3.1  /  TBili  1.2  /  DBili  x   /  AST  24  /  ALT  5   /  AlkPhos  62  [11-15-18 @ 08:01]    PT/INR: PT 20.3 , INR 1.80       [11-16-18 @ 07:55]  PTT: 41.3       [11-16-18 @ 07:55]          [11-14-18 @ 17:18]    Creatinine Trend:  SCr 6.25 [11-16 @ 07:55]  SCr 9.06 [11-15 @ 08:01]  SCr 7.23 [11-14 @ 06:40]    Sodium trend:        Iron 30, TIBC 147, %sat --      [02-12-18 @ 06:50]  Ferritin 1670      [02-11-18 @ 06:10]  TSH 4.49      [02-10-18 @ 14:22]

## 2018-11-16 NOTE — PROGRESS NOTE ADULT - SUBJECTIVE AND OBJECTIVE BOX
Patient is a 73y old  Male who presents with a chief complaint of RP bleed (15 Nov 2018 16:50)      SUBJECTIVE/OVERNIGHT EVENTS     No acute events overnight. Pt with some pain, better than yesterday. Improved with pain medication. Denies nausea, vomiting, constipation, diarrhea. after vitamin K, INR 6.17-->4.59.    MEDICATIONS  (STANDING):  amLODIPine   Tablet 10 milliGRAM(s) Oral daily  atorvastatin 40 milliGRAM(s) Oral at bedtime  docusate sodium 100 milliGRAM(s) Oral daily  ferrous    sulfate 325 milliGRAM(s) Oral daily  folic acid 1 milliGRAM(s) Oral daily  hydrALAZINE 10 milliGRAM(s) Oral three times a day  influenza   Vaccine 0.5 milliLiter(s) IntraMuscular once  metoprolol tartrate 25 milliGRAM(s) Oral two times a day  senna 2 Tablet(s) Oral at bedtime  sevelamer hydrochloride 1600 milliGRAM(s) Oral every 12 hours    MEDICATIONS  (PRN):  oxyCODONE    5 mG/acetaminophen 325 mG 1 Tablet(s) Oral every 6 hours PRN moderate to severe pain    CAPILLARY BLOOD GLUCOSE        I&O's Summary    15 Nov 2018 07:01  -  16 Nov 2018 07:00  --------------------------------------------------------  IN: 400 mL / OUT: 900 mL / NET: -500 mL          Vital Signs Last 24 Hrs  T(C): 36.8 (16 Nov 2018 06:50), Max: 37.1 (16 Nov 2018 02:30)  T(F): 98.2 (16 Nov 2018 06:50), Max: 98.8 (16 Nov 2018 02:30)  HR: 88 (16 Nov 2018 06:50) (61 - 88)  BP: 166/67 (16 Nov 2018 06:50) (136/70 - 166/67)  BP(mean): --  RR: 17 (16 Nov 2018 06:50) (16 - 19)  SpO2: 95% (16 Nov 2018 06:50) (93% - 97%)    PHYSICAL EXAM:  General: NAD, poor dentition  Neurology: A&Ox3, nonfocal  Respiratory: CTABL  CV: RRR, S1S2, systolic mumur  GI: abdomen soft, NT, ND, +BS  MSK: mild pain to palpation of L flank  Extremities: No edema, + peripheral pulses, large venous aneurysm of LUE  Skin: warm and dry      LABS                        9.9    4.88  )-----------( 115      ( 15 Nov 2018 20:10 )             30.8                         10.0   5.47  )-----------( 122      ( 15 Nov 2018 08:01 )             31.1     11-15    132<L>  |  94<L>  |  49<H>  ----------------------------<  85  5.4<H>   |  22  |  9.06<H>    Ca    9.2      15 Nov 2018 08:01  Phos  6.0     11-15  Mg     2.4     11-15    TPro  7.6  /  Alb  3.1<L>  /  TBili  1.2  /  DBili  x   /  AST  24  /  ALT  5   /  AlkPhos  62  11-15    PT/INR - ( 15 Nov 2018 20:10 )   PT: 54.9 SEC;   INR: 4.59          PTT - ( 15 Nov 2018 08:01 )  PTT:56.9 SEC   14 Nov 2018 17:18 Troponin x     /  u/L / CKMB 1.41 ng/mL / CKMB Units x       14 Nov 2018 06:40 Troponin x     /  u/L / CKMB x     / CKMB Units x                    RADIOLOGY & ADDITIONAL TESTS:    Imaging Personally Reviewed:    Consultant(s) Notes Reviewed:      Care Discussed with Consultants/Other Providers: Patient is a 73y old  Male who presents with a chief complaint of RP bleed (15 Nov 2018 16:50)      SUBJECTIVE/OVERNIGHT EVENTS     No acute events overnight. Pt with some pain, better than yesterday. Improved with pain medication. Denies nausea, vomiting, constipation, diarrhea. after vitamin K, INR 6.17-->4.59.    MEDICATIONS  (STANDING):  amLODIPine   Tablet 10 milliGRAM(s) Oral daily  atorvastatin 40 milliGRAM(s) Oral at bedtime  docusate sodium 100 milliGRAM(s) Oral daily  ferrous    sulfate 325 milliGRAM(s) Oral daily  folic acid 1 milliGRAM(s) Oral daily  hydrALAZINE 10 milliGRAM(s) Oral three times a day  influenza   Vaccine 0.5 milliLiter(s) IntraMuscular once  metoprolol tartrate 25 milliGRAM(s) Oral two times a day  senna 2 Tablet(s) Oral at bedtime  sevelamer hydrochloride 1600 milliGRAM(s) Oral every 12 hours    MEDICATIONS  (PRN):  oxyCODONE    5 mG/acetaminophen 325 mG 1 Tablet(s) Oral every 6 hours PRN moderate to severe pain    CAPILLARY BLOOD GLUCOSE        I&O's Summary    15 Nov 2018 07:01  -  16 Nov 2018 07:00  --------------------------------------------------------  IN: 400 mL / OUT: 900 mL / NET: -500 mL          Vital Signs Last 24 Hrs  T(C): 36.8 (16 Nov 2018 06:50), Max: 37.1 (16 Nov 2018 02:30)  T(F): 98.2 (16 Nov 2018 06:50), Max: 98.8 (16 Nov 2018 02:30)  HR: 88 (16 Nov 2018 06:50) (61 - 88)  BP: 166/67 (16 Nov 2018 06:50) (136/70 - 166/67)  BP(mean): --  RR: 17 (16 Nov 2018 06:50) (16 - 19)  SpO2: 95% (16 Nov 2018 06:50) (93% - 97%)    PHYSICAL EXAM:  General: NAD, poor dentition  Neurology: A&Ox3, nonfocal  Respiratory: CTABL  CV: RRR, S1S2, systolic mumur  GI: abdomen soft, NT, ND, +BS  MSK: pain to palpation of L flank  Extremities: No edema, + peripheral pulses, large venous aneurysm of LUE  Skin: warm and dry      LABS                        9.9    4.88  )-----------( 115      ( 15 Nov 2018 20:10 )             30.8                         10.0   5.47  )-----------( 122      ( 15 Nov 2018 08:01 )             31.1     11-15    132<L>  |  94<L>  |  49<H>  ----------------------------<  85  5.4<H>   |  22  |  9.06<H>    Ca    9.2      15 Nov 2018 08:01  Phos  6.0     11-15  Mg     2.4     11-15    TPro  7.6  /  Alb  3.1<L>  /  TBili  1.2  /  DBili  x   /  AST  24  /  ALT  5   /  AlkPhos  62  11-15    PT/INR - ( 15 Nov 2018 20:10 )   PT: 54.9 SEC;   INR: 4.59          PTT - ( 15 Nov 2018 08:01 )  PTT:56.9 SEC   14 Nov 2018 17:18 Troponin x     /  u/L / CKMB 1.41 ng/mL / CKMB Units x       14 Nov 2018 06:40 Troponin x     /  u/L / CKMB x     / CKMB Units x                    RADIOLOGY & ADDITIONAL TESTS:    Imaging Personally Reviewed:    Consultant(s) Notes Reviewed:      Care Discussed with Consultants/Other Providers:

## 2018-11-16 NOTE — PROGRESS NOTE ADULT - PROBLEM SELECTOR PLAN 3
- Holding coumadin  - On metoprolol succinate 25mg qdaily and 50 mg q daily at home  - Will c/w lopressor 25mg BID for now - Dose 2.5mg coumadin now  - On metoprolol succinate 25mg qdaily and 50 mg q daily at home  - Will c/w lopressor 25mg BID for now

## 2018-11-17 LAB
APTT BLD: 104.3 SEC — HIGH (ref 27.5–36.3)
APTT BLD: 115.3 SEC — HIGH (ref 27.5–36.3)
APTT BLD: 78.1 SEC — HIGH (ref 27.5–36.3)
APTT BLD: 95.8 SEC — HIGH (ref 27.5–36.3)
HCT VFR BLD CALC: 28.2 % — LOW (ref 39–50)
HCT VFR BLD CALC: 28.2 % — LOW (ref 39–50)
HGB BLD-MCNC: 9.3 G/DL — LOW (ref 13–17)
HGB BLD-MCNC: 9.3 G/DL — LOW (ref 13–17)
INR BLD: 1.47 — HIGH (ref 0.88–1.17)
INR BLD: 1.53 — HIGH (ref 0.88–1.17)
MCHC RBC-ENTMCNC: 31.3 PG — SIGNIFICANT CHANGE UP (ref 27–34)
MCHC RBC-ENTMCNC: 31.5 PG — SIGNIFICANT CHANGE UP (ref 27–34)
MCHC RBC-ENTMCNC: 33 % — SIGNIFICANT CHANGE UP (ref 32–36)
MCHC RBC-ENTMCNC: 33 % — SIGNIFICANT CHANGE UP (ref 32–36)
MCV RBC AUTO: 94.9 FL — SIGNIFICANT CHANGE UP (ref 80–100)
MCV RBC AUTO: 95.6 FL — SIGNIFICANT CHANGE UP (ref 80–100)
NRBC # FLD: 0 — SIGNIFICANT CHANGE UP
NRBC # FLD: 0 — SIGNIFICANT CHANGE UP
PLATELET # BLD AUTO: 121 K/UL — LOW (ref 150–400)
PLATELET # BLD AUTO: 130 K/UL — LOW (ref 150–400)
PMV BLD: 9.5 FL — SIGNIFICANT CHANGE UP (ref 7–13)
PMV BLD: 9.7 FL — SIGNIFICANT CHANGE UP (ref 7–13)
PROTHROM AB SERPL-ACNC: 17 SEC — HIGH (ref 9.8–13.1)
PROTHROM AB SERPL-ACNC: 17.2 SEC — HIGH (ref 9.8–13.1)
RBC # BLD: 2.95 M/UL — LOW (ref 4.2–5.8)
RBC # BLD: 2.97 M/UL — LOW (ref 4.2–5.8)
RBC # FLD: 15.7 % — HIGH (ref 10.3–14.5)
RBC # FLD: 15.8 % — HIGH (ref 10.3–14.5)
WBC # BLD: 3.62 K/UL — LOW (ref 3.8–10.5)
WBC # BLD: 4.26 K/UL — SIGNIFICANT CHANGE UP (ref 3.8–10.5)
WBC # FLD AUTO: 3.62 K/UL — LOW (ref 3.8–10.5)
WBC # FLD AUTO: 4.26 K/UL — SIGNIFICANT CHANGE UP (ref 3.8–10.5)

## 2018-11-17 PROCEDURE — 99233 SBSQ HOSP IP/OBS HIGH 50: CPT

## 2018-11-17 PROCEDURE — 99233 SBSQ HOSP IP/OBS HIGH 50: CPT | Mod: GC

## 2018-11-17 PROCEDURE — 99232 SBSQ HOSP IP/OBS MODERATE 35: CPT | Mod: GC

## 2018-11-17 RX ORDER — WARFARIN SODIUM 2.5 MG/1
2.5 TABLET ORAL ONCE
Qty: 0 | Refills: 0 | Status: DISCONTINUED | OUTPATIENT
Start: 2018-11-17 | End: 2018-11-17

## 2018-11-17 RX ORDER — WARFARIN SODIUM 2.5 MG/1
5 TABLET ORAL ONCE
Qty: 0 | Refills: 0 | Status: COMPLETED | OUTPATIENT
Start: 2018-11-17 | End: 2018-11-17

## 2018-11-17 RX ORDER — POLYETHYLENE GLYCOL 3350 17 G/17G
17 POWDER, FOR SOLUTION ORAL DAILY
Qty: 0 | Refills: 0 | Status: DISCONTINUED | OUTPATIENT
Start: 2018-11-17 | End: 2018-11-20

## 2018-11-17 RX ADMIN — HEPARIN SODIUM 900 UNIT(S)/HR: 5000 INJECTION INTRAVENOUS; SUBCUTANEOUS at 02:06

## 2018-11-17 RX ADMIN — WARFARIN SODIUM 5 MILLIGRAM(S): 2.5 TABLET ORAL at 17:30

## 2018-11-17 RX ADMIN — HEPARIN SODIUM 800 UNIT(S)/HR: 5000 INJECTION INTRAVENOUS; SUBCUTANEOUS at 22:55

## 2018-11-17 RX ADMIN — HEPARIN SODIUM 900 UNIT(S)/HR: 5000 INJECTION INTRAVENOUS; SUBCUTANEOUS at 08:48

## 2018-11-17 RX ADMIN — POLYETHYLENE GLYCOL 3350 17 GRAM(S): 17 POWDER, FOR SOLUTION ORAL at 12:23

## 2018-11-17 RX ADMIN — HEPARIN SODIUM 800 UNIT(S)/HR: 5000 INJECTION INTRAVENOUS; SUBCUTANEOUS at 15:49

## 2018-11-17 NOTE — PROGRESS NOTE ADULT - SUBJECTIVE AND OBJECTIVE BOX
[facility] DIVISION OF KIDNEY DISEASES AND HYPERTENSION -- FOLLOW UP NOTE  --------------------------------------------------------------------------------  Chief Complaint: ESRD/HD management    24 hour events/subjective: Patient seen and examined at bedside while receiving HD treatment. Patient tolerating HD treatment without complaints. denies CP, SOB, N/V/F/C.    PAST HISTORY  --------------------------------------------------------------------------------  No significant changes to PMH, PSH, FHx, SHx, unless otherwise noted    ALLERGIES & MEDICATIONS  --------------------------------------------------------------------------------  Allergies    No Known Allergies    Intolerances    Standing Inpatient Medications  amLODIPine   Tablet 10 milliGRAM(s) Oral daily  atorvastatin 40 milliGRAM(s) Oral at bedtime  docusate sodium 100 milliGRAM(s) Oral daily  ferrous    sulfate 325 milliGRAM(s) Oral daily  folic acid 1 milliGRAM(s) Oral daily  heparin  Infusion.  Unit(s)/Hr IV Continuous <Continuous>  hydrALAZINE 10 milliGRAM(s) Oral three times a day  influenza   Vaccine 0.5 milliLiter(s) IntraMuscular once  metoprolol tartrate 25 milliGRAM(s) Oral two times a day  polyethylene glycol 3350 17 Gram(s) Oral daily  senna 2 Tablet(s) Oral at bedtime  sevelamer hydrochloride 1600 milliGRAM(s) Oral every 12 hours  warfarin 5 milliGRAM(s) Oral once    REVIEW OF SYSTEMS  --------------------------------------------------------------------------------  Gen: + weakness  Head/Eyes/Ears/Mouth: No headache  Respiratory: No dyspnea,   CV: No chest pain,   GI: No abdominal pain,  MSK: back pain   Neuro:  no seizures,  Psych: No anxiety  no fistula bleeding, anurysmal L AVF     All other systems were reviewed and are negative, except as noted.    VITALS/PHYSICAL EXAM  --------------------------------------------------------------------------------  T(C): 36.8 (11-17-18 @ 11:45), Max: 36.9 (11-16-18 @ 21:16)  HR: 69 (11-17-18 @ 11:45) (69 - 88)  BP: 142/72 (11-17-18 @ 11:45) (112/60 - 177/58)  RR: 18 (11-17-18 @ 11:45) (16 - 18)  SpO2: 95% (11-17-18 @ 05:41) (89% - 96%)  Wt(kg): --        Physical Exam:    	Gen: NAD,   	HEENT: supple neck, NO JVD   	Pulm: CTA B/L              CV: RRR, S1S2; no rub  	Abd: +BS, soft, nontender/nondistended  	UE:  no edema;   	LE:  no edema  	Neuro: No focal deficits,   	Psych: Normal affect and mood.   	Vascular access: L aneurysmal AVF. No bleeding./ R AV fistula accessed for HD treatment    LABS/STUDIES  --------------------------------------------------------------------------------              9.3    4.26  >-----------<  121      [11-17-18 @ 01:30]              28.2     134  |  97  |  27  ----------------------------<  94      [11-16-18 @ 07:55]  4.2   |  25  |  6.25        Ca     9.1     [11-16-18 @ 07:55]      Mg     2.2     [11-16-18 @ 07:55]      Phos  3.9     [11-16-18 @ 07:55]    PT/INR: PT 17.0 , INR 1.47       [11-17-18 @ 07:47]  PTT: 78.1       [11-17-18 @ 07:47]    Creatinine Trend:  SCr 6.25 [11-16 @ 07:55]  SCr 9.06 [11-15 @ 08:01]  SCr 7.23 [11-14 @ 06:40]

## 2018-11-17 NOTE — PROGRESS NOTE ADULT - PROBLEM SELECTOR PLAN 3
- Dose 2.5mg coumadin now  - On metoprolol succinate 25mg qdaily and 50 mg q daily at home  - Will c/w lopressor 25mg BID for now

## 2018-11-17 NOTE — PROGRESS NOTE ADULT - PROBLEM SELECTOR PLAN 1
ESRD on HD MWF. Pt. last had HD on 11/15. Pt. clinically stable. Tolerating maintenance HD today. Monitor BP and labs

## 2018-11-17 NOTE — PROGRESS NOTE ADULT - SUBJECTIVE AND OBJECTIVE BOX
Medicine Progress Note- PGY3    =========================================  CONTACT INFO  Edward Matamoros M.D., PGY-3  Pager: [phone]  NS: [phone]    Chief Complaint: Patient is a 73y old  Male who presents with a chief complaint of RP bleed (16 Nov 2018 07:55)      INTERVAL HPI/OVERNIGHT EVENTS: No events ON. Pain well controlled. No bleeding. Dosed 2.5 coumadin. Due for HD today.    REVIEW OF SYSTEMS:   CONSTITUTIONAL:  Denies f nvd chills  HEENT:  Eyes:  Denies visual loss, blurred vision, double vision or scleral icterus. Ears, Nose, Throat:  Denies hearing loss, sneezing, congestion, runny nose or sore throat.  SKIN:  Denies rash or itching.  CARDIOVASCULAR:  Denies chest pain, RICHARD  RESPIRATORY:  Denies shortness of breath, cough or sputum.  GASTROINTESTINAL:  Denies anorexia, nausea, vomiting or diarrhea. No abdominal pain or blood.  GENITOURINARY:  Denies any hematuria, dysuria  NEUROLOGICAL:  Denies headache, dizziness, syncope, paralysis, ataxia, numbness or tingling in the extremities. No change in bowel or bladder control.  MUSCULOSKELETAL:  Denies muscle, back pain, joint pain or stiffness.  HEMATOLOGIC:  Denies anemia, bleeding or bruising.  LYMPHATICS:  Denies enlarged nodes.   PSYCHIATRIC:  Denies history of depression or anxiety.  ENDOCRINOLOGIC:  Denies reports of sweating, cold or heat intolerance. No polyuria or polydipsia.  ALLERGIES:  Denies history of asthma, hives, eczema or rhinitis.    MEDICATIONS  (STANDING):  amLODIPine   Tablet 10 milliGRAM(s) Oral daily  atorvastatin 40 milliGRAM(s) Oral at bedtime  docusate sodium 100 milliGRAM(s) Oral daily  ferrous    sulfate 325 milliGRAM(s) Oral daily  folic acid 1 milliGRAM(s) Oral daily  heparin  Infusion.  Unit(s)/Hr (11 mL/Hr) IV Continuous <Continuous>  hydrALAZINE 10 milliGRAM(s) Oral three times a day  influenza   Vaccine 0.5 milliLiter(s) IntraMuscular once  metoprolol tartrate 25 milliGRAM(s) Oral two times a day  polyethylene glycol 3350 17 Gram(s) Oral daily  senna 2 Tablet(s) Oral at bedtime  sevelamer hydrochloride 1600 milliGRAM(s) Oral every 12 hours  warfarin 2.5 milliGRAM(s) Oral once    MEDICATIONS  (PRN):  heparin  Injectable 5000 Unit(s) IV Push every 6 hours PRN For aPTT less than 40  heparin  Injectable 2500 Unit(s) IV Push every 6 hours PRN For aPTT between 40 - 57  oxyCODONE    5 mG/acetaminophen 325 mG 1 Tablet(s) Oral every 6 hours PRN moderate to severe pain      Vital Signs Last 24 Hrs  T(C): 36.9 (17 Nov 2018 05:41), Max: 36.9 (16 Nov 2018 21:16)  T(F): 98.4 (17 Nov 2018 05:41), Max: 98.4 (16 Nov 2018 21:16)  HR: 84 (17 Nov 2018 05:41) (80 - 88)  BP: 166/66 (17 Nov 2018 05:41) (112/60 - 177/58)  BP(mean): --  RR: 18 (17 Nov 2018 05:41) (16 - 20)  SpO2: 95% (17 Nov 2018 05:41) (89% - 100%)  Supplemental O2: [ ] No, on Room Air [ ] Yes,     I&O's Detail    CAPILLARY BLOOD GLUCOSE          PHYSICAL EXAM:  Daily     Daily    GENERAL: NAD,   HEAD:  NC/AT  EYES: EOMI, white sclerae  ENMT: MMM, no oropharyngeal lesions or erythema appreciated, dentition well appearing  Neck: trachea midline, no appreciable masses  Pulm: normal work of breathing, CTA b/l  CV: S1&S2+, rrr, no m/r/g appreciated  ABDOMEN: soft, nt, nd,   : no cva tenderness, no nelson placed  EXTREMITIES:  no appreciable edema in b/l LE  Neuro: A&Ox3, no focal deficits  SKIN: warm and dry, no visible rash    LABS:                        9.3    4.26  )-----------( 121      ( 17 Nov 2018 01:30 )             28.2     11-16    134<L>  |  97<L>  |  27<H>  ----------------------------<  94  4.2   |  25  |  6.25<H>    Ca    9.1      16 Nov 2018 07:55  Phos  3.9     11-16  Mg     2.2     11-16      LIVER FUNCTIONS - ( 15 Nov 2018 08:01 )  Alb: 3.1 g/dL / Pro: 7.6 g/dL / ALK PHOS: 62 u/L / ALT: 5 u/L / AST: 24 u/L / GGT: x     / T. Bili 1.2 mg/dL / D. Bili x         PT/INR - ( 17 Nov 2018 07:47 )   PT: 17.0 SEC;   INR: 1.47          PTT - ( 17 Nov 2018 07:47 )  PTT:78.1 SEC      Microbiology:    RADIOLOGY & ADDITIONAL TESTS:

## 2018-11-17 NOTE — PROGRESS NOTE ADULT - PROBLEM SELECTOR PLAN 1
-INR wubtherapeutic Dose 2.5mg coumadin now.  - Abd CT:  Asymmetric enlargement of the left psoas muscle, new compared to prior examination. Findings are concerning for retroperitoneal hematoma.  - Surgery consulted. No surgical intervention indicated, appreciate recs.  - s/p vitamin K reversal. Now subtherapeutic. Giving coumadin 2.5mg dose now,  Will start heparin gtt given mechanical MV pending surgery recs.  -Trend CBC q12hrs, and daily INR  - Keep transfusion goal >7  - If ongoing active bleeding, will consult IR  - Pain control w/ percocet 5mg q6 for now

## 2018-11-17 NOTE — PROGRESS NOTE ADULT - ASSESSMENT
72 yo Cantonese speaking man with history of mechanical valve, AFib on Coumadin, HTN, ESRD (MWF, most recent dialysis on Monday) and cirrhosis presented with acute onset of lower left back pain found to have a supratherapeutic INR and retroperitoneal bleed      Problem/Plan - 1:  ·  Problem: Retroperitoneal hematoma.  Given vitamin K for supratherapeutic INR, now subtherapeutic INR.   After consultation with surgery, okay to restart anticoagulation with close monitoring of CBC which has remained stable over past 48 hrs  On heparin gtt --> warfarin  INR goal 2-3.  Plan to dose warfarin 5 mg tonight  - Abd CT:  Asymmetric enlargement of the left psoas muscle, new compared to prior examination. Findings are concerning for retroperitoneal hematoma.  -Trend CBC q12hrs, and daily INR, transfuse as needed to keep Hgb > 7 (currently > 9).       Problem/Plan - 2:  ·  Problem: Atrial fibrillation.  On anticoagulation.  - On Lopressor 25mg BID        Problem/Plan - 3:  ·  Problem: Troponin level elevated.  Plan: - Most likely 2/2 CKD.  Continue supportive management.

## 2018-11-17 NOTE — PROGRESS NOTE ADULT - SUBJECTIVE AND OBJECTIVE BOX
Cardiology/Vascular Medicine Inpatient Progress Note    No active complaints  No complaints of CP, SOB, pain.  Currently on heparin gtt  Received warfarin 2.5 mg  Will monitor CBC which appears stable over last 48 hrs  HD planned for today    Vital Signs Last 24 Hrs  T(C): 36.8 (17 Nov 2018 15:02), Max: 36.9 (16 Nov 2018 21:16)  T(F): 98.2 (17 Nov 2018 15:02), Max: 98.4 (16 Nov 2018 21:16)  HR: 88 (17 Nov 2018 15:02) (69 - 88)  BP: 146/65 (17 Nov 2018 15:02) (112/60 - 177/58)  BP(mean): --  RR: 18 (17 Nov 2018 15:02) (17 - 18)  SpO2: 95% (17 Nov 2018 05:41) (89% - 96%)    Appearance: Chronically-ill appearing	  HEENT:  Poor dentition  Cardiovascular: Normal S1 S2, No JVD, 2/6 Sm, +click  Respiratory: Lungs clear to auscultation	  Psychiatry: Awake, but appears confused, Cantonese speaking  Gastrointestinal:  Soft, Non-tender, + BS	  Skin: No rashes, No ecchymoses, No cyanosis	  Neurologic: Non-focal    MEDICATIONS  (STANDING):  amLODIPine   Tablet 10 milliGRAM(s) Oral daily  atorvastatin 40 milliGRAM(s) Oral at bedtime  docusate sodium 100 milliGRAM(s) Oral daily  ferrous    sulfate 325 milliGRAM(s) Oral daily  folic acid 1 milliGRAM(s) Oral daily  heparin  Infusion.  Unit(s)/Hr (11 mL/Hr) IV Continuous <Continuous>  hydrALAZINE 10 milliGRAM(s) Oral three times a day  influenza   Vaccine 0.5 milliLiter(s) IntraMuscular once  metoprolol tartrate 25 milliGRAM(s) Oral two times a day  polyethylene glycol 3350 17 Gram(s) Oral daily  senna 2 Tablet(s) Oral at bedtime  sevelamer hydrochloride 1600 milliGRAM(s) Oral every 12 hours  warfarin 5 milliGRAM(s) Oral once    MEDICATIONS  (PRN):  heparin  Injectable 5000 Unit(s) IV Push every 6 hours PRN For aPTT less than 40  heparin  Injectable 2500 Unit(s) IV Push every 6 hours PRN For aPTT between 40 - 57  oxyCODONE    5 mG/acetaminophen 325 mG 1 Tablet(s) Oral every 6 hours PRN moderate to severe pain      LABS:	 	                                     9.3    3.62  )-----------( 130      ( 17 Nov 2018 14:08 )             28.2   11-16    134<L>  |  97<L>  |  27<H>  ----------------------------<  94  4.2   |  25  |  6.25<H>    Ca    9.1      16 Nov 2018 07:55  Phos  3.9     11-16  Mg     2.2     11-16    PT/INR - ( 17 Nov 2018 14:08 )   PT: 17.2 SEC;   INR: 1.53     PTT - ( 17 Nov 2018 14:08 )  PTT:115.3 SEC    < from: CT Angio Abdomen and Pelvis w/ IV Cont (11.14.18 @ 07:48) >    EXAM:  CT ANGIO ABD PELV (W)AW IC        PROCEDURE DATE:  Nov 14 2018         INTERPRETATION:  CLINICAL INFORMATION: Severe back pain on   anticoagulation. Evaluate for abdominal aortic aneurysm.    COMPARISON: CT abdomen and pelvis from 7/1/2014. CT chest from 2/10/2018.    PROCEDURE:   CT Angiography of the Abdomen and Pelvis.  Arterial phase images were acquired.  Intravenous contrast: 90 ml Omnipaque 350. 10 ml discarded.  Oral contrast: None.  Sagittal and coronal reformats were performed as well as 3D (MIP)   reconstructions.    FINDINGS:    LOWER CHEST: Status post median sternotomy. Cardiomegaly with prior   atrial enlargement. Status post mitral valve replacement. Coronary artery   calcifications.    Consolidative opacities in the right lower lobe and bilateral lower lobe   patchy opacities, right greater than left likely atelectasis with   superimposed pneumonia.    Small right and trace left pleural effusion.    LIVER: Cirrhosis with few stable scattered hepatic cysts in the right   hepatic lobe.  BILE DUCTS: Normal caliber.  GALLBLADDER: Cholelithiasis.  SPLEEN: Within normal limits.  PANCREAS: 1.6 cm hypodense lesion in the neck of the pancreas. A 6 mm   hypodense lesion in the body of the pancreas is unchanged since prior   examination, and likely low-grade cystic pancreatic neoplasm such as IPMN.  ADRENALS: Within normal limits.  KIDNEYS/URETERS: Bilateral atrophic kidneys. Right renal cysts. Unchanged   several left intrarenal calculi. No hydronephrosis.    BLADDER: Underdistended with mildly thickened wall.  REPRODUCTIVE ORGANS: Prostate is normal in size.    BOWEL: No bowel obstruction. Appendix nonvisualized.  PERITONEUM: Large volume abdominal and pelvic ascites.  VESSELS:  Atherosclerotic changes. Abdominal aorta is normal in caliber.   The celiac axis, SMA and CARTER are patent. RETROPERITONEUM: No   lymphadenopathy.    ABDOMINAL WALL: Within normal limits.  BONES: Degenerative changes of the spine.    IMPRESSION:     *  No evidence of abdominal aorticaneurysm, dissection, or rupture.  *  Asymmetric enlargement of the left psoas muscle, new compared to prior   examination. Findings are concerning for retroperitoneal hematoma.  *  Consolidative opacities in the right lower lobe and patchy  opacities   in the bilateral lower lobe likely pneumonia with atelectasis.  *  Small right and trace left pleural effusion.  *  Large volume abdominal pelvic ascites.  *  A 1.6 cm hypodense lesion in the neck of the pancreas. Consider   further evaluation with MR in nonemergent basis.    Findings discussed with EILEEN Davidson at 9:32 AM on 11/14/2018.      RAISSA RIZZO M.D., RADIOLOGY RESIDENT  This document has been electronically signed.  LUIS RAMOS M.D., ATTENDING RADIOLOGIST  This document has been electronically signed. Nov 14 2018  9:33AM        < from: Transthoracic Echocardiogram (02.15.18 @ 10:34) >  Patient name: FILI RAMOS  YOB: 1945   Age: 72 (M)   MR#: [id number]  Study Date: 2/15/2018  Location: [facility] Sonographer: Sarah Reno RDCS  Study quality: Technically good  Referring Physician: Gayathri Puckett MD  Blood Pressure: 124/56 mmHg  Height: 165 cm  Weight: 63 kg  BSA: 1.7 m2  ------------------------------------------------------------------------  PROCEDURE: Transthoracic echocardiogram with 2-D, M-Mode  and complete spectral and color flow Doppler.  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 Minimal mitral regurgitation. Mean transmitral  valve gradient equals 10 mm Hg, which is elevated even in  the setting of a prosthetic valve.  Aortic Root: Normal aortic root.  Aortic Valve: Calcified trileaflet aortic valve with normal  opening. Mild aortic regurgitation.  Left Atrium: Severely dilated left atrium.  LA volume index  = 154 cc/m2.  Left Ventricle: Normal left ventricular systolic function.  No segmental wall motion abnormalities. Normal left  ventricular internal dimensions and wall thicknesses.  Right Heart: Severe right atrial enlargement. Right  ventricular enlargement with decreased right ventricular  systolic function. Normal tricuspid valve.  Moderate  tricuspid regurgitation. Normal pulmonic valve. Minimal  pulmonic regurgitation.  Pericardium/PleuraNormal pericardium with no pericardial  effusion.  Hemodynamic: Estimated right ventricular systolic pressure  equals 69 mm Hg, assuming right atrial pressure equals 10  mm Hg, consistent with severe pulmonary hypertension.  ------------------------------------------------------------------------  CONCLUSIONS:  1. Mechanical prosthetic mitral valve replacement. Minimal  mitral regurgitation. Mean transmitral valve gradient  equals 10 mm Hg, which is elevated even in the setting of a  prosthetic valve.  2. Calcified trileaflet aortic valve with normal opening.  Mild aortic regurgitation.  3. Severely dilated left atrium.  LA volume index = 154  cc/m2.  4. Normal left ventricular internal dimensions and wall  thicknesses.  5. Normal left ventricular systolic function. No segmental  wall motion abnormalities.  6. Severe right atrial enlargement.  7. Right ventricular enlargement with decreased right  ventricular systolic function.  8. Estimated right ventricular systolic pressure equals 69  mm Hg, assuming right atrial pressure equals 10 mm Hg,  consistent with severe pulmonary hypertension.  Consider MATTHEW for further evaluation of the prosthetic  mitral valve, if clinically indicated.  ------------------------------------------------------------------------  Confirmed on  2/15/2018 - 13:19:36 by Kaleb Luis M.D.  ------------------------------------------------------------------------    < end of copied text >

## 2018-11-18 LAB
APTT BLD: 107.5 SEC — HIGH (ref 27.5–36.3)
APTT BLD: 108.3 SEC — HIGH (ref 27.5–36.3)
APTT BLD: 97.6 SEC — HIGH (ref 27.5–36.3)
BUN SERPL-MCNC: 22 MG/DL — SIGNIFICANT CHANGE UP (ref 7–23)
CALCIUM SERPL-MCNC: 8.8 MG/DL — SIGNIFICANT CHANGE UP (ref 8.4–10.5)
CHLORIDE SERPL-SCNC: 97 MMOL/L — LOW (ref 98–107)
CO2 SERPL-SCNC: 26 MMOL/L — SIGNIFICANT CHANGE UP (ref 22–31)
CREAT SERPL-MCNC: 5.5 MG/DL — HIGH (ref 0.5–1.3)
GLUCOSE SERPL-MCNC: 89 MG/DL — SIGNIFICANT CHANGE UP (ref 70–99)
HCT VFR BLD CALC: 27.9 % — LOW (ref 39–50)
HGB BLD-MCNC: 9 G/DL — LOW (ref 13–17)
INR BLD: 1.98 — HIGH (ref 0.88–1.17)
MCHC RBC-ENTMCNC: 31.1 PG — SIGNIFICANT CHANGE UP (ref 27–34)
MCHC RBC-ENTMCNC: 32.3 % — SIGNIFICANT CHANGE UP (ref 32–36)
MCV RBC AUTO: 96.5 FL — SIGNIFICANT CHANGE UP (ref 80–100)
NRBC # FLD: 0 — SIGNIFICANT CHANGE UP
PLATELET # BLD AUTO: 113 K/UL — LOW (ref 150–400)
PMV BLD: 9.5 FL — SIGNIFICANT CHANGE UP (ref 7–13)
POTASSIUM SERPL-MCNC: 4.1 MMOL/L — SIGNIFICANT CHANGE UP (ref 3.5–5.3)
POTASSIUM SERPL-SCNC: 4.1 MMOL/L — SIGNIFICANT CHANGE UP (ref 3.5–5.3)
PROTHROM AB SERPL-ACNC: 22.4 SEC — HIGH (ref 9.8–13.1)
RBC # BLD: 2.89 M/UL — LOW (ref 4.2–5.8)
RBC # FLD: 15.6 % — HIGH (ref 10.3–14.5)
SODIUM SERPL-SCNC: 136 MMOL/L — SIGNIFICANT CHANGE UP (ref 135–145)
WBC # BLD: 3.46 K/UL — LOW (ref 3.8–10.5)
WBC # FLD AUTO: 3.46 K/UL — LOW (ref 3.8–10.5)

## 2018-11-18 PROCEDURE — 99233 SBSQ HOSP IP/OBS HIGH 50: CPT | Mod: GC

## 2018-11-18 PROCEDURE — 99233 SBSQ HOSP IP/OBS HIGH 50: CPT

## 2018-11-18 RX ORDER — WARFARIN SODIUM 2.5 MG/1
4 TABLET ORAL ONCE
Qty: 0 | Refills: 0 | Status: COMPLETED | OUTPATIENT
Start: 2018-11-18 | End: 2018-11-18

## 2018-11-18 RX ADMIN — WARFARIN SODIUM 4 MILLIGRAM(S): 2.5 TABLET ORAL at 22:40

## 2018-11-18 RX ADMIN — HEPARIN SODIUM 700 UNIT(S)/HR: 5000 INJECTION INTRAVENOUS; SUBCUTANEOUS at 05:16

## 2018-11-18 RX ADMIN — POLYETHYLENE GLYCOL 3350 17 GRAM(S): 17 POWDER, FOR SOLUTION ORAL at 11:54

## 2018-11-18 RX ADMIN — HEPARIN SODIUM 700 UNIT(S)/HR: 5000 INJECTION INTRAVENOUS; SUBCUTANEOUS at 11:51

## 2018-11-18 NOTE — PROGRESS NOTE ADULT - SUBJECTIVE AND OBJECTIVE BOX
Cardiology/Vascular Medicine Inpatient Progress Note    No active complaints  No complaints of CP, SOB, pain.  Currently on heparin gtt --> warfarin  To receive warfarin 4 mg daily  Will monitor CBC which has remained stable    Vital Signs Last 24 Hrs  T(C): 36.5 (18 Nov 2018 08:29), Max: 37.2 (17 Nov 2018 21:13)  T(F): 97.7 (18 Nov 2018 08:29), Max: 99 (17 Nov 2018 21:13)  HR: 77 (18 Nov 2018 08:29) (71 - 88)  BP: 126/62 (18 Nov 2018 08:29) (107/55 - 161/82)  BP(mean): --  RR: 17 (18 Nov 2018 08:29) (17 - 18)  SpO2: 96% (18 Nov 2018 08:29) (95% - 96%)    Appearance: Chronically-ill appearing	  HEENT:  Poor dentition  Cardiovascular: Normal S1 S2, No JVD, 2/6 Sm, +click  Respiratory: Lungs clear to auscultation	  Psychiatry: Awake, but appears confused, Cantonese speaking  Gastrointestinal:  Soft, Non-tender, + BS	  Skin: No rashes, No ecchymoses, No cyanosis	  Neurologic: Non-focal    MEDICATIONS  (STANDING):  amLODIPine   Tablet 10 milliGRAM(s) Oral daily  atorvastatin 40 milliGRAM(s) Oral at bedtime  docusate sodium 100 milliGRAM(s) Oral daily  ferrous    sulfate 325 milliGRAM(s) Oral daily  folic acid 1 milliGRAM(s) Oral daily  heparin  Infusion.  Unit(s)/Hr (11 mL/Hr) IV Continuous <Continuous>  hydrALAZINE 10 milliGRAM(s) Oral three times a day  influenza   Vaccine 0.5 milliLiter(s) IntraMuscular once  metoprolol tartrate 25 milliGRAM(s) Oral two times a day  polyethylene glycol 3350 17 Gram(s) Oral daily  senna 2 Tablet(s) Oral at bedtime  sevelamer hydrochloride 1600 milliGRAM(s) Oral every 12 hours  warfarin 4 milliGRAM(s) Oral once    MEDICATIONS  (PRN):  heparin  Injectable 5000 Unit(s) IV Push every 6 hours PRN For aPTT less than 40  heparin  Injectable 2500 Unit(s) IV Push every 6 hours PRN For aPTT between 40 - 57  oxyCODONE    5 mG/acetaminophen 325 mG 1 Tablet(s) Oral every 6 hours PRN moderate to severe pain      LABS:	 	                          9.0    3.46  )-----------( 113      ( 18 Nov 2018 04:30 )             27.9   11-18    136  |  97<L>  |  22  ----------------------------<  89  4.1   |  26  |  5.50<H>    Ca    8.8      18 Nov 2018 04:30    PT/INR - ( 18 Nov 2018 04:30 )   PT: 22.4 SEC;   INR: 1.98     PTT - ( 18 Nov 2018 09:55 )  PTT:97.6 SEC                        < from: CT Angio Abdomen and Pelvis w/ IV Cont (11.14.18 @ 07:48) >    EXAM:  CT ANGIO ABD PELV (W)AW IC        PROCEDURE DATE:  Nov 14 2018         INTERPRETATION:  CLINICAL INFORMATION: Severe back pain on   anticoagulation. Evaluate for abdominal aortic aneurysm.    COMPARISON: CT abdomen and pelvis from 7/1/2014. CT chest from 2/10/2018.    PROCEDURE:   CT Angiography of the Abdomen and Pelvis.  Arterial phase images were acquired.  Intravenous contrast: 90 ml Omnipaque 350. 10 ml discarded.  Oral contrast: None.  Sagittal and coronal reformats were performed as well as 3D (MIP)   reconstructions.    FINDINGS:    LOWER CHEST: Status post median sternotomy. Cardiomegaly with prior   atrial enlargement. Status post mitral valve replacement. Coronary artery   calcifications.    Consolidative opacities in the right lower lobe and bilateral lower lobe   patchy opacities, right greater than left likely atelectasis with   superimposed pneumonia.    Small right and trace left pleural effusion.    LIVER: Cirrhosis with few stable scattered hepatic cysts in the right   hepatic lobe.  BILE DUCTS: Normal caliber.  GALLBLADDER: Cholelithiasis.  SPLEEN: Within normal limits.  PANCREAS: 1.6 cm hypodense lesion in the neck of the pancreas. A 6 mm   hypodense lesion in the body of the pancreas is unchanged since prior   examination, and likely low-grade cystic pancreatic neoplasm such as IPMN.  ADRENALS: Within normal limits.  KIDNEYS/URETERS: Bilateral atrophic kidneys. Right renal cysts. Unchanged   several left intrarenal calculi. No hydronephrosis.    BLADDER: Underdistended with mildly thickened wall.  REPRODUCTIVE ORGANS: Prostate is normal in size.    BOWEL: No bowel obstruction. Appendix nonvisualized.  PERITONEUM: Large volume abdominal and pelvic ascites.  VESSELS:  Atherosclerotic changes. Abdominal aorta is normal in caliber.   The celiac axis, SMA and CARTER are patent. RETROPERITONEUM: No   lymphadenopathy.    ABDOMINAL WALL: Within normal limits.  BONES: Degenerative changes of the spine.    IMPRESSION:     *  No evidence of abdominal aorticaneurysm, dissection, or rupture.  *  Asymmetric enlargement of the left psoas muscle, new compared to prior   examination. Findings are concerning for retroperitoneal hematoma.  *  Consolidative opacities in the right lower lobe and patchy  opacities   in the bilateral lower lobe likely pneumonia with atelectasis.  *  Small right and trace left pleural effusion.  *  Large volume abdominal pelvic ascites.  *  A 1.6 cm hypodense lesion in the neck of the pancreas. Consider   further evaluation with MR in nonemergent basis.    Findings discussed with EILEEN Davidson at 9:32 AM on 11/14/2018.      RAISSA RIZZO M.D., RADIOLOGY RESIDENT  This document has been electronically signed.  LUIS RAMOS M.D., ATTENDING RADIOLOGIST  This document has been electronically signed. Nov 14 2018  9:33AM        < from: Transthoracic Echocardiogram (02.15.18 @ 10:34) >  Patient name: FILI RAMOS  YOB: 1945   Age: 72 (M)   MR#: [id number]  Study Date: 2/15/2018  Location: [facility] Sonographer: Sarah Reno RDCS  Study quality: Technically good  Referring Physician: Gayathri Puckett MD  Blood Pressure: 124/56 mmHg  Height: 165 cm  Weight: 63 kg  BSA: 1.7 m2  ------------------------------------------------------------------------  PROCEDURE: Transthoracic echocardiogram with 2-D, M-Mode  and complete spectral and color flow Doppler.  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 Minimal mitral regurgitation. Mean transmitral  valve gradient equals 10 mm Hg, which is elevated even in  the setting of a prosthetic valve.  Aortic Root: Normal aortic root.  Aortic Valve: Calcified trileaflet aortic valve with normal  opening. Mild aortic regurgitation.  Left Atrium: Severely dilated left atrium.  LA volume index  = 154 cc/m2.  Left Ventricle: Normal left ventricular systolic function.  No segmental wall motion abnormalities. Normal left  ventricular internal dimensions and wall thicknesses.  Right Heart: Severe right atrial enlargement. Right  ventricular enlargement with decreased right ventricular  systolic function. Normal tricuspid valve.  Moderate  tricuspid regurgitation. Normal pulmonic valve. Minimal  pulmonic regurgitation.  Pericardium/PleuraNormal pericardium with no pericardial  effusion.  Hemodynamic: Estimated right ventricular systolic pressure  equals 69 mm Hg, assuming right atrial pressure equals 10  mm Hg, consistent with severe pulmonary hypertension.  ------------------------------------------------------------------------  CONCLUSIONS:  1. Mechanical prosthetic mitral valve replacement. Minimal  mitral regurgitation. Mean transmitral valve gradient  equals 10 mm Hg, which is elevated even in the setting of a  prosthetic valve.  2. Calcified trileaflet aortic valve with normal opening.  Mild aortic regurgitation.  3. Severely dilated left atrium.  LA volume index = 154  cc/m2.  4. Normal left ventricular internal dimensions and wall  thicknesses.  5. Normal left ventricular systolic function. No segmental  wall motion abnormalities.  6. Severe right atrial enlargement.  7. Right ventricular enlargement with decreased right  ventricular systolic function.  8. Estimated right ventricular systolic pressure equals 69  mm Hg, assuming right atrial pressure equals 10 mm Hg,  consistent with severe pulmonary hypertension.  Consider MATTHEW for further evaluation of the prosthetic  mitral valve, if clinically indicated.  ------------------------------------------------------------------------  Confirmed on  2/15/2018 - 13:19:36 by Kaleb Luis M.D.  ------------------------------------------------------------------------    < end of copied text >

## 2018-11-18 NOTE — PROGRESS NOTE ADULT - SUBJECTIVE AND OBJECTIVE BOX
Patient is a 73y old  Male who presents with a chief complaint of Anticoagulation management (17 Nov 2018 15:33)      SUBJECTIVE/OVERNIGHT EVENTS     No acute events overnight. Pt noted to have small hematoma of lateral tongue, likely bit it. Denies pain at site. Denies nausea, vomiting, constipation, diarrhea.    MEDICATIONS  (STANDING):  amLODIPine   Tablet 10 milliGRAM(s) Oral daily  atorvastatin 40 milliGRAM(s) Oral at bedtime  docusate sodium 100 milliGRAM(s) Oral daily  ferrous    sulfate 325 milliGRAM(s) Oral daily  folic acid 1 milliGRAM(s) Oral daily  heparin  Infusion.  Unit(s)/Hr (11 mL/Hr) IV Continuous <Continuous>  hydrALAZINE 10 milliGRAM(s) Oral three times a day  influenza   Vaccine 0.5 milliLiter(s) IntraMuscular once  metoprolol tartrate 25 milliGRAM(s) Oral two times a day  polyethylene glycol 3350 17 Gram(s) Oral daily  senna 2 Tablet(s) Oral at bedtime  sevelamer hydrochloride 1600 milliGRAM(s) Oral every 12 hours    MEDICATIONS  (PRN):  heparin  Injectable 5000 Unit(s) IV Push every 6 hours PRN For aPTT less than 40  heparin  Injectable 2500 Unit(s) IV Push every 6 hours PRN For aPTT between 40 - 57  oxyCODONE    5 mG/acetaminophen 325 mG 1 Tablet(s) Oral every 6 hours PRN moderate to severe pain    CAPILLARY BLOOD GLUCOSE        I&O's Summary    17 Nov 2018 07:01  -  18 Nov 2018 07:00  --------------------------------------------------------  IN: 400 mL / OUT: 2400 mL / NET: -2000 mL          Vital Signs Last 24 Hrs  T(C): 36.7 (18 Nov 2018 01:13), Max: 37.2 (17 Nov 2018 21:13)  T(F): 98.1 (18 Nov 2018 01:13), Max: 99 (17 Nov 2018 21:13)  HR: 71 (18 Nov 2018 05:21) (69 - 88)  BP: 161/82 (18 Nov 2018 05:21) (107/55 - 161/82)  BP(mean): --  RR: 17 (18 Nov 2018 05:21) (17 - 18)  SpO2: 96% (18 Nov 2018 05:21) (95% - 96%)    PHYSICAL EXAM:  GENERAL: NAD, thin appearing  HEAD:  Atraumatic, Normocephalic  EYES: EOMI, PERRLA, conjunctiva and sclera clear  NECK: Supple, No JVD  CHEST/LUNG: Clear to auscultation bilaterally; No wheeze  HEART: Regular rate and rhythm; +systolic murmur  ABDOMEN: Soft, Nontender, Nondistended; Bowel sounds present. +pain to palpation of left flank.  EXTREMITIES:  2+ Peripheral Pulses, No clubbing, cyanosis, or edema  PSYCH: AAOx3  NEUROLOGY: non-focal    LABS                        9.0    3.46  )-----------( 113      ( 18 Nov 2018 04:30 )             27.9                         9.3    3.62  )-----------( 130      ( 17 Nov 2018 14:08 )             28.2     11-18    136  |  97<L>  |  22  ----------------------------<  89  4.1   |  26  |  5.50<H>    Ca    8.8      18 Nov 2018 04:30      PT/INR - ( 18 Nov 2018 04:30 )   PT: 22.4 SEC;   INR: 1.98          PTT - ( 18 Nov 2018 04:30 )  PTT:107.5 SEC   14 Nov 2018 17:18 Troponin x     /  u/L / CKMB 1.41 ng/mL / CKMB Units x       14 Nov 2018 06:40 Troponin x     /  u/L / CKMB x     / CKMB Units x                    RADIOLOGY & ADDITIONAL TESTS:    Imaging Personally Reviewed:    Consultant(s) Notes Reviewed:      Care Discussed with Consultants/Other Providers:

## 2018-11-18 NOTE — PROGRESS NOTE ADULT - ASSESSMENT
74 yo Cantonese speaking man with history of mechanical valve, AFib on Coumadin, HTN, ESRD (MWF, most recent dialysis on Monday) and cirrhosis presented with acute onset of lower left back pain found to have a supratherapeutic INR and retroperitoneal bleed      Problem/Plan - 1:  ·  Problem: Retroperitoneal hematoma.  Given vitamin K for supratherapeutic INR, now nearly therapeutic INR  After consultation with surgery, okay to restart anticoagulation with close monitoring of CBC which has remained stable   On heparin gtt --> warfarin  INR goal 2-3, nearly therapeutic INR today  Plan to dose warfarin 4 mg tonight  - Abd CT:  Asymmetric enlargement of the left psoas muscle, new compared to prior examination. Findings are concerning for retroperitoneal hematoma.  -Trend CBC q12hrs, and daily INR, transfuse as needed to keep Hgb > 7 (currently > 9).       Problem/Plan - 2:  ·  Problem: Atrial fibrillation.  On anticoagulation.  - On Lopressor 25mg BID        Problem/Plan - 3:  ·  Problem: Troponin level elevated.  Plan: - Most likely 2/2 CKD.  Continue supportive management.

## 2018-11-18 NOTE — PROGRESS NOTE ADULT - PROBLEM SELECTOR PLAN 1
-INR slightly subtherapeutic this AM  - Abd CT:  Asymmetric enlargement of the left psoas muscle, new compared to prior examination. Findings are concerning for retroperitoneal hematoma.  - Surgery consulted. No surgical intervention indicated, appreciate recs.  - s/p vitamin K reversal. Now subtherapeutic INR. On heparin gtt given mechanical MV pending surgery recs.  -Trend CBC q12hrs, and daily INR  - Keep transfusion goal >7  - If ongoing active bleeding, will consult IR  - Pain control w/ percocet 5mg q6 for now

## 2018-11-19 ENCOUNTER — TRANSCRIPTION ENCOUNTER (OUTPATIENT)
Age: 73
End: 2018-11-19

## 2018-11-19 LAB
ALBUMIN SERPL ELPH-MCNC: 3 G/DL — LOW (ref 3.3–5)
ALP SERPL-CCNC: 66 U/L — SIGNIFICANT CHANGE UP (ref 40–120)
ALT FLD-CCNC: 5 U/L — SIGNIFICANT CHANGE UP (ref 4–41)
APTT BLD: 101.6 SEC — HIGH (ref 27.5–36.3)
APTT BLD: 110 SEC — HIGH (ref 27.5–36.3)
APTT BLD: 90.7 SEC — HIGH (ref 27.5–36.3)
AST SERPL-CCNC: 22 U/L — SIGNIFICANT CHANGE UP (ref 4–40)
BILIRUB SERPL-MCNC: 1.1 MG/DL — SIGNIFICANT CHANGE UP (ref 0.2–1.2)
BUN SERPL-MCNC: 36 MG/DL — HIGH (ref 7–23)
CALCIUM SERPL-MCNC: 9.1 MG/DL — SIGNIFICANT CHANGE UP (ref 8.4–10.5)
CHLORIDE SERPL-SCNC: 96 MMOL/L — LOW (ref 98–107)
CO2 SERPL-SCNC: 25 MMOL/L — SIGNIFICANT CHANGE UP (ref 22–31)
CREAT SERPL-MCNC: 7.55 MG/DL — HIGH (ref 0.5–1.3)
GLUCOSE SERPL-MCNC: 85 MG/DL — SIGNIFICANT CHANGE UP (ref 70–99)
HCT VFR BLD CALC: 27.9 % — LOW (ref 39–50)
HGB BLD-MCNC: 9.1 G/DL — LOW (ref 13–17)
INR BLD: 3.05 — HIGH (ref 0.88–1.17)
MAGNESIUM SERPL-MCNC: 2.3 MG/DL — SIGNIFICANT CHANGE UP (ref 1.6–2.6)
MCHC RBC-ENTMCNC: 31.3 PG — SIGNIFICANT CHANGE UP (ref 27–34)
MCHC RBC-ENTMCNC: 32.6 % — SIGNIFICANT CHANGE UP (ref 32–36)
MCV RBC AUTO: 95.9 FL — SIGNIFICANT CHANGE UP (ref 80–100)
NRBC # FLD: 0 — SIGNIFICANT CHANGE UP
PHOSPHATE SERPL-MCNC: 4.8 MG/DL — HIGH (ref 2.5–4.5)
PLATELET # BLD AUTO: 138 K/UL — LOW (ref 150–400)
PMV BLD: 9.7 FL — SIGNIFICANT CHANGE UP (ref 7–13)
POTASSIUM SERPL-MCNC: 4.5 MMOL/L — SIGNIFICANT CHANGE UP (ref 3.5–5.3)
POTASSIUM SERPL-SCNC: 4.5 MMOL/L — SIGNIFICANT CHANGE UP (ref 3.5–5.3)
PROT SERPL-MCNC: 7.5 G/DL — SIGNIFICANT CHANGE UP (ref 6–8.3)
PROTHROM AB SERPL-ACNC: 36 SEC — HIGH (ref 9.8–13.1)
RBC # BLD: 2.91 M/UL — LOW (ref 4.2–5.8)
RBC # FLD: 15.4 % — HIGH (ref 10.3–14.5)
SODIUM SERPL-SCNC: 135 MMOL/L — SIGNIFICANT CHANGE UP (ref 135–145)
WBC # BLD: 3.61 K/UL — LOW (ref 3.8–10.5)
WBC # FLD AUTO: 3.61 K/UL — LOW (ref 3.8–10.5)

## 2018-11-19 PROCEDURE — 99232 SBSQ HOSP IP/OBS MODERATE 35: CPT | Mod: GC

## 2018-11-19 PROCEDURE — 99232 SBSQ HOSP IP/OBS MODERATE 35: CPT

## 2018-11-19 PROCEDURE — 99239 HOSP IP/OBS DSCHRG MGMT >30: CPT

## 2018-11-19 RX ORDER — WARFARIN SODIUM 2.5 MG/1
2.5 TABLET ORAL ONCE
Qty: 0 | Refills: 0 | Status: COMPLETED | OUTPATIENT
Start: 2018-11-19 | End: 2018-11-19

## 2018-11-19 RX ADMIN — WARFARIN SODIUM 2.5 MILLIGRAM(S): 2.5 TABLET ORAL at 22:06

## 2018-11-19 RX ADMIN — HEPARIN SODIUM 600 UNIT(S)/HR: 5000 INJECTION INTRAVENOUS; SUBCUTANEOUS at 08:43

## 2018-11-19 RX ADMIN — HEPARIN SODIUM 600 UNIT(S)/HR: 5000 INJECTION INTRAVENOUS; SUBCUTANEOUS at 02:32

## 2018-11-19 NOTE — PROGRESS NOTE ADULT - PROBLEM SELECTOR PLAN 1
ESRD on HD MWF. Pt. last had HD on 11/15. Pt. clinically stable. Tolerating maintenance HD today. Monitor BP and labs ESRD on HD MWF. Pt. last had HD on 11/17. Pt. clinically stable. Schedule for maintenance HD today. Monitor BP and labs Pt. with ESRD on HD TIW. Pt. had HD on 11/17. Pt. clinically stable. Labs reviewed. Plan for maintenance HD today. Monitor BP and labs

## 2018-11-19 NOTE — DISCHARGE NOTE ADULT - CARE PROVIDER_API CALL
Branden Garcia (MD; PhD), Cardiology; Internal Medicine; Vascular Medicine  [address] [place] [address]  [place] [address]  Phone: [phone]  Fax: [phone]    Kaleb Holt), Urology  [address] [place] [id number]  Phone: [phone]  Fax: [phone]

## 2018-11-19 NOTE — DISCHARGE NOTE ADULT - CONDITIONS AT DISCHARGE
This Patient is stable for discharge, he is alert and oriented x 3, [place] with left Ear hearing Aide . He has the Right AV Fistula; and Dialysis on M / W ? F; Skin is intact and the LEFT AV fistula is non functioning .  The External Jugular SL is removed prior to discharge ; he remains anuric ; vital signs are stable and Discharge Instructions are discussed and given.

## 2018-11-19 NOTE — PROGRESS NOTE ADULT - PROBLEM SELECTOR PLAN 4
Continue binders with meals.  low phos diet.  Monitor Ca++ and phos. Ca++ levels wnl , Phos ~4.8.  Continue binders with meals.  Monitor Ca++ and phos. Serum calcium and serum phosphorus levels in target range. Pt. on phosphate binders with meals. Monitor serum calcium and phosphorus

## 2018-11-19 NOTE — PROGRESS NOTE ADULT - ASSESSMENT
This is a 72 y/o M with a PMHx of HTN, MVR on coumadin,  ESRD on HD MWF admitted for RP hematoma. Pt. with ESRD on HD TIW (MWF0 admitted for RP hematoma.

## 2018-11-19 NOTE — DISCHARGE NOTE ADULT - CARE PLAN
Principal Discharge DX:	Retroperitoneal hematoma  Goal:	Treatment of hematoma  Assessment and plan of treatment:	We stopped your blood thinner when you first came to the hospital so the bleeding would end in your back. We then restarted it once your blood counts were at the correct level. Please continue taking your coumadin every day as prescribed.  Secondary Diagnosis:	Chronic kidney disease-mineral and bone disorder  Goal:	Management of CKD  Assessment and plan of treatment:	We continued your hemodialysis treatments while you were in the hospital. There were no complications related to your kidneys.  Secondary Diagnosis:	Atrial fibrillation, unspecified type  Goal:	Management of AFIB  Assessment and plan of treatment:	We continued your atrial fibrillation medications in the hospital. You experienced no issues with your afib. Please continue taking your heart medications as prescribed.  Secondary Diagnosis:	Hypertension, unspecified type  Goal:	Management of HTN  Assessment and plan of treatment:	We continued your blood pressure medications while you were in the hospital. Please continue taking your blood pressure medications at home as prescribed.  Secondary Diagnosis:	Troponin level elevated  Goal:	Rule out ACS  Assessment and plan of treatment:	While you were in the hospital, we ruled out any issues with your heart. Please continue seeing your cardiologist. Principal Discharge DX:	Retroperitoneal hematoma  Goal:	Treatment of hematoma  Assessment and plan of treatment:	We stopped your blood thinner when you first came to the hospital so the bleeding would end in your back. We then restarted it once your blood counts were at the correct level. Please continue taking your coumadin starting Wednesday, the 21st. Then please check your INR at your dialysis center on Friday.  Secondary Diagnosis:	Chronic kidney disease-mineral and bone disorder  Goal:	Management of CKD  Assessment and plan of treatment:	We continued your hemodialysis treatments while you were in the hospital. There were no complications related to your kidneys.  Secondary Diagnosis:	Atrial fibrillation, unspecified type  Goal:	Management of AFIB  Assessment and plan of treatment:	We continued your atrial fibrillation medications in the hospital. You experienced no issues with your afib. Please continue taking your heart medications as prescribed.  Secondary Diagnosis:	Hypertension, unspecified type  Goal:	Management of HTN  Assessment and plan of treatment:	We continued your blood pressure medications while you were in the hospital. Please continue taking your blood pressure medications at home as prescribed.  Secondary Diagnosis:	Troponin level elevated  Goal:	Rule out ACS  Assessment and plan of treatment:	While you were in the hospital, we ruled out any issues with your heart. Please continue seeing your cardiologist.

## 2018-11-19 NOTE — DISCHARGE NOTE ADULT - SECONDARY DIAGNOSIS.
Chronic kidney disease-mineral and bone disorder Atrial fibrillation, unspecified type Hypertension, unspecified type Troponin level elevated

## 2018-11-19 NOTE — DISCHARGE NOTE ADULT - ADDITIONAL INSTRUCTIONS
Please follow up with Dr. Garcia, your cardiologist, once you leave the hospital. Please do not take your first dose of coumadin until Wednesday, November 21st.   Please follow up with Dr. Garcia, your cardiologist, once you leave the hospital.

## 2018-11-19 NOTE — PROGRESS NOTE ADULT - SUBJECTIVE AND OBJECTIVE BOX
[facility] DIVISION OF KIDNEY DISEASES AND HYPERTENSION -- FOLLOW UP NOTE  --------------------------------------------------------------------------------  Chief Complaint:    24 hour events/subjective:        PAST HISTORY  --------------------------------------------------------------------------------  No significant changes to PMH, PSH, FHx, SHx, unless otherwise noted    ALLERGIES & MEDICATIONS  --------------------------------------------------------------------------------  Allergies    No Known Allergies    Intolerances      Standing Inpatient Medications  amLODIPine   Tablet 10 milliGRAM(s) Oral daily  atorvastatin 40 milliGRAM(s) Oral at bedtime  docusate sodium 100 milliGRAM(s) Oral daily  ferrous    sulfate 325 milliGRAM(s) Oral daily  folic acid 1 milliGRAM(s) Oral daily  heparin  Infusion.  Unit(s)/Hr IV Continuous <Continuous>  hydrALAZINE 10 milliGRAM(s) Oral three times a day  influenza   Vaccine 0.5 milliLiter(s) IntraMuscular once  metoprolol tartrate 25 milliGRAM(s) Oral two times a day  polyethylene glycol 3350 17 Gram(s) Oral daily  senna 2 Tablet(s) Oral at bedtime  sevelamer hydrochloride 1600 milliGRAM(s) Oral every 12 hours    PRN Inpatient Medications  heparin  Injectable 5000 Unit(s) IV Push every 6 hours PRN  heparin  Injectable 2500 Unit(s) IV Push every 6 hours PRN  oxyCODONE    5 mG/acetaminophen 325 mG 1 Tablet(s) Oral every 6 hours PRN      REVIEW OF SYSTEMS  --------------------------------------------------------------------------------  Gen: No weight changes, fatigue, fevers/chills, weakness  Skin: No rashes  Head/Eyes/Ears/Mouth: No headache; Normal hearing; Normal vision w/o blurriness; No sinus pain/discomfort, sore throat  Respiratory: No dyspnea, cough, wheezing, hemoptysis  CV: No chest pain, PND, orthopnea  GI: No abdominal pain, diarrhea, constipation, nausea, vomiting, melena, hematochezia  : No increased frequency, dysuria, hematuria, nocturia  MSK: No joint pain/swelling; no back pain; no edema  Neuro: No dizziness/lightheadedness, weakness, seizures, numbness, tingling  Heme: No easy bruising or bleeding  Endo: No heat/cold intolerance  Psych: No significant nervousness, anxiety, stress, depression    All other systems were reviewed and are negative, except as noted.    VITALS/PHYSICAL EXAM  --------------------------------------------------------------------------------  T(C): 36.8 (11-19-18 @ 10:20), Max: 36.8 (11-18-18 @ 12:52)  HR: 82 (11-19-18 @ 10:20) (69 - 95)  BP: 152/68 (11-19-18 @ 10:20) (115/55 - 158/70)  RR: 18 (11-19-18 @ 10:20) (16 - 18)  SpO2: 95% (11-19-18 @ 10:15) (94% - 98%)  Wt(kg): --        Physical Exam:  	Gen: NAD, well-appearing  	HEENT: PERRL, supple neck, clear oropharynx  	Pulm: CTA B/L  	CV: RRR, S1S2; no rub  	Back: No spinal or CVA tenderness; no sacral edema  	Abd: +BS, soft, nontender/nondistended  	: No suprapubic tenderness  	UE: Warm, FROM, no clubbing, intact strength; no edema; no asterixis  	LE: Warm, FROM, no clubbing, intact strength; no edema  	Neuro: No focal deficits, intact gait  	Psych: Normal affect and mood  	Skin: Warm, without rashes  	Vascular access:    LABS/STUDIES  --------------------------------------------------------------------------------              9.1    3.61  >-----------<  138      [11-19-18 @ 08:00]              27.9     135  |  96  |  36  ----------------------------<  85      [11-19-18 @ 08:00]  4.5   |  25  |  7.55        Ca     9.1     [11-19-18 @ 08:00]      Mg     2.3     [11-19-18 @ 08:00]      Phos  4.8     [11-19-18 @ 08:00]    TPro  7.5  /  Alb  3.0  /  TBili  1.1  /  DBili  x   /  AST  22  /  ALT  5   /  AlkPhos  66  [11-19-18 @ 08:00]    PT/INR: PT 36.0 , INR 3.05       [11-19-18 @ 08:00]  PTT: 90.7       [11-19-18 @ 08:00]      Creatinine Trend:  SCr 7.55 [11-19 @ 08:00]  SCr 5.50 [11-18 @ 04:30]  SCr 6.25 [11-16 @ 07:55]  SCr 9.06 [11-15 @ 08:01]  SCr 7.23 [11-14 @ 06:40]        Iron 30, TIBC 147, %sat --      [02-12-18 @ 06:50]  Ferritin 1670      [02-11-18 @ 06:10]  TSH 4.49      [02-10-18 @ 14:22] [facility] DIVISION OF KIDNEY DISEASES AND HYPERTENSION -- HEMODIALYSIS NOTE  --------------------------------------------------------------------------------  Chief Complaint: ESRD/Ongoing hemodialysis requirement    24 hour events/subjective:  Pt seen and examined. Denies any acute complaint.        PAST HISTORY  --------------------------------------------------------------------------------  No significant changes to PMH, PSH, FHx, SHx, unless otherwise noted    ALLERGIES & MEDICATIONS  --------------------------------------------------------------------------------  Allergies    No Known Allergies    Intolerances      Standing Inpatient Medications  amLODIPine   Tablet 10 milliGRAM(s) Oral daily  atorvastatin 40 milliGRAM(s) Oral at bedtime  docusate sodium 100 milliGRAM(s) Oral daily  ferrous    sulfate 325 milliGRAM(s) Oral daily  folic acid 1 milliGRAM(s) Oral daily  heparin  Infusion.  Unit(s)/Hr IV Continuous <Continuous>  hydrALAZINE 10 milliGRAM(s) Oral three times a day  influenza   Vaccine 0.5 milliLiter(s) IntraMuscular once  metoprolol tartrate 25 milliGRAM(s) Oral two times a day  polyethylene glycol 3350 17 Gram(s) Oral daily  senna 2 Tablet(s) Oral at bedtime  sevelamer hydrochloride 1600 milliGRAM(s) Oral every 12 hours    PRN Inpatient Medications  heparin  Injectable 5000 Unit(s) IV Push every 6 hours PRN  heparin  Injectable 2500 Unit(s) IV Push every 6 hours PRN  oxyCODONE    5 mG/acetaminophen 325 mG 1 Tablet(s) Oral every 6 hours PRN      REVIEW OF SYSTEMS  --------------------------------------------------------------------------------  Gen: no fever, chills weakness  Head/Eyes/Ears/Mouth: No headache  Respiratory: No dyspnea  CV: No chest pain, dyspnea   GI: No abdominal pain, nausea, vomiting, diarrhea  MSK: no back pain, edema      All other systems were reviewed and are negative, except as noted.    VITALS/PHYSICAL EXAM  --------------------------------------------------------------------------------  T(C): 36.8 (11-19-18 @ 10:20), Max: 36.8 (11-18-18 @ 12:52)  HR: 82 (11-19-18 @ 10:20) (69 - 95)  BP: 152/68 (11-19-18 @ 10:20) (115/55 - 158/70)  RR: 18 (11-19-18 @ 10:20) (16 - 18)  SpO2: 95% (11-19-18 @ 10:15) (94% - 98%)  Wt(kg): --        Physical Exam:                    Gen; NAD  	HEENT: supple neck  	Pulm: CTA B/L              CV: RRR, S1S2; no rub  	Abd: +BS, soft, nontender/nondistended  	UE:  no edema  	LE:  no edema  	Vascular access: RUE AV fistula, bandage over site, thrill+, bruit. LUE  fistula ;aneurysmal, skin intact, thrill+       LABS/STUDIES  --------------------------------------------------------------------------------              9.1    3.61  >-----------<  138      [11-19-18 @ 08:00]              27.9     135  |  96  |  36  ----------------------------<  85      [11-19-18 @ 08:00]  4.5   |  25  |  7.55        Ca     9.1     [11-19-18 @ 08:00]      Mg     2.3     [11-19-18 @ 08:00]      Phos  4.8     [11-19-18 @ 08:00]    TPro  7.5  /  Alb  3.0  /  TBili  1.1  /  DBili  x   /  AST  22  /  ALT  5   /  AlkPhos  66  [11-19-18 @ 08:00]    PT/INR: PT 36.0 , INR 3.05       [11-19-18 @ 08:00]  PTT: 90.7       [11-19-18 @ 08:00]      Iron 30, TIBC 147, %sat --      [02-12-18 @ 06:50]  Ferritin 1670      [02-11-18 @ 06:10]  TSH 4.49      [02-10-18 @ 14:22] [facility] DIVISION OF KIDNEY DISEASES AND HYPERTENSION -- HEMODIALYSIS NOTE  --------------------------------------------------------------------------------  Chief Complaint: ESRD/Ongoing hemodialysis requirement    24 hour events/subjective: Pt. with ESRD on HD TIW admitted with RP hematoma. Pt seen and examined. Pt. feels better and offers no complaints. Pt. denies CP, SOB, HA or dizziness.    PAST HISTORY  --------------------------------------------------------------------------------  No significant changes to PMH, PSH, FHx, SHx, unless otherwise noted    ALLERGIES & MEDICATIONS  --------------------------------------------------------------------------------  Allergies    No Known Allergies    Intolerances    Standing Inpatient Medications  amLODIPine   Tablet 10 milliGRAM(s) Oral daily  atorvastatin 40 milliGRAM(s) Oral at bedtime  docusate sodium 100 milliGRAM(s) Oral daily  ferrous    sulfate 325 milliGRAM(s) Oral daily  folic acid 1 milliGRAM(s) Oral daily  heparin  Infusion.  Unit(s)/Hr IV Continuous <Continuous>  hydrALAZINE 10 milliGRAM(s) Oral three times a day  influenza   Vaccine 0.5 milliLiter(s) IntraMuscular once  metoprolol tartrate 25 milliGRAM(s) Oral two times a day  polyethylene glycol 3350 17 Gram(s) Oral daily  senna 2 Tablet(s) Oral at bedtime  sevelamer hydrochloride 1600 milliGRAM(s) Oral every 12 hours    PRN Inpatient Medications  heparin  Injectable 5000 Unit(s) IV Push every 6 hours PRN  heparin  Injectable 2500 Unit(s) IV Push every 6 hours PRN  oxyCODONE    5 mG/acetaminophen 325 mG 1 Tablet(s) Oral every 6 hours PRN    REVIEW OF SYSTEMS  --------------------------------------------------------------------------------  Gen: no fever  Head/Eyes/Ears/Mouth: No headache  Respiratory: No dyspnea  CV: No chest pain, dyspnea   GI: No abdominal pain  MSK: no back pain, edema    All other systems were reviewed and are negative, except as noted.    VITALS/PHYSICAL EXAM  --------------------------------------------------------------------------------  T(C): 36.8 (11-19-18 @ 10:20), Max: 36.8 (11-18-18 @ 12:52)  HR: 82 (11-19-18 @ 10:20) (69 - 95)  BP: 152/68 (11-19-18 @ 10:20) (115/55 - 158/70)  RR: 18 (11-19-18 @ 10:20) (16 - 18)  SpO2: 95% (11-19-18 @ 10:15) (94% - 98%)  Wt(kg): --    Physical Exam:              Gen: resting, NAD  	HEENT: No JVD  	Pulm: CTA B/L              CV: S1S2+; no rub  	Abd: +BS, soft, nontender/nondistended  	UE: no edema  	LE: no edema  	Vascular access: RUE AVF (being used for HD) site: skin intact, bruit+. LUE AVF site: aneurysmal, skin intact      LABS/STUDIES  --------------------------------------------------------------------------------              9.1    3.61  >-----------<  138      [11-19-18 @ 08:00]              27.9     135  |  96  |  36  ----------------------------<  85      [11-19-18 @ 08:00]  4.5   |  25  |  7.55        Ca     9.1     [11-19-18 @ 08:00]      Mg     2.3     [11-19-18 @ 08:00]      Phos  4.8     [11-19-18 @ 08:00]    TPro  7.5  /  Alb  3.0  /  TBili  1.1  /  DBili  x   /  AST  22  /  ALT  5   /  AlkPhos  66  [11-19-18 @ 08:00]

## 2018-11-19 NOTE — DISCHARGE NOTE ADULT - HOSPITAL COURSE
74 yo Cantonese speaking male with history of mechanical valve, AFib on coumadin, HTN, ESRD (MWF, most recent dialysis on Monday) and cirrhosis presented with acute onset of lower left back pain. Per patient and his son, the pain started last night when patient bent down to put on socks. Patient tried to sleep it off overnight, however patient requested to be taken to hospital this AM given severe lower back pain. Patient and son deny any external trauma or injuries. Patient never had similar pain before. Patient came to ER as the pain persisted. Patient normally ambulates without assistance, but given pain has not been able to ambulate Patient denies pain anywhere else, shortness breath, CP, palpitations or lightheadedness.  Patient and family don't recall when was the last time patient had his INR checked outpatient.     Pt was placed on heparin drip for heparin to coumadin bridge. On 11/17, he was dosed 5mg. His INR was still subtherapeutic, so the next night he was dosed 4mg. His INR was therapeutic, so he will be sent out on 2.5mg coumadin. He experienced no issues related to the hematoma. General surgery was consulted and did not feel pt needed any intervention. He his main issue is pain, but it has gotten better over the past few days progressively. He still reports pain, but has not asked for his pain meds, and says he would like to go home. He has been seen and examined and is stable to go home.

## 2018-11-19 NOTE — PROGRESS NOTE ADULT - PROBLEM SELECTOR PLAN 3
with superimposed blood loss. Continue with Epo 4,000 IU twice weekly with HD.  hgb stable with superimposed blood loss. Hb stable at 9.0 today.  Continue with Epo 4,000 IU twice weekly with HD. Pt. with multifactorial anemia including blood loss. Hb stable at 9.0 today. Continue with Epogen with HD. Monitor hemoglobin

## 2018-11-19 NOTE — PROGRESS NOTE ADULT - PROBLEM SELECTOR PLAN 1
-INR +/- therapeutic this AM   - Abd CT:  Asymmetric enlargement of the left psoas muscle, new compared to prior examination. Findings are concerning for retroperitoneal hematoma.  - Surgery consulted. No surgical intervention indicated, appreciate recs.  - s/p vitamin K reversal 2.5mg PO x1. Now subtherapeutic INR. On heparin gtt given mechanical MV pending surgery recs.  -Trend CBC q12hrs, and daily INR  - Keep transfusion goal >7  - If ongoing active bleeding, will consult IR  - Pain control w/ percocet 5mg q6 for now

## 2018-11-19 NOTE — PROGRESS NOTE ADULT - ASSESSMENT
74 yo Cantonese speaking man with history of mechanical valve, AFib on Coumadin, HTN, ESRD (MWF, most recent dialysis on Monday) and cirrhosis presented with acute onset of lower left back pain found to have a supratherapeutic INR and retroperitoneal bleed      Problem/Plan - 1:  ·  Problem: Retroperitoneal hematoma.  Given vitamin K for supratherapeutic INR, now nearly therapeutic INR  After consultation with surgery, okay to restart anticoagulation with close monitoring of CBC which has remained stable   On heparin gtt --> warfarin  INR goal 2-3, nearly therapeutic INR today; keep closer to INR ~2 lower end of range  - Abd CT:  Asymmetric enlargement of the left psoas muscle, new compared to prior examination. Findings are concerning for retroperitoneal hematoma.  -Trend CBCs and INR, transfuse as needed to keep Hgb > 7 (currently > 9).       Problem/Plan - 2:  ·  Problem: Atrial fibrillation.  On anticoagulation.  - On Lopressor 25mg BID        Problem/Plan - 3:  ·  Problem: Troponin level elevated.  Plan: - Most likely 2/2 CKD.  Continue supportive management.

## 2018-11-19 NOTE — DISCHARGE NOTE ADULT - CARE PROVIDERS DIRECT ADDRESSES
,[email or web address]

## 2018-11-19 NOTE — PROGRESS NOTE ADULT - SUBJECTIVE AND OBJECTIVE BOX
Patient is a 73y old  Male who presents with a chief complaint of Anticoagulation management (18 Nov 2018 12:31)      SUBJECTIVE/OVERNIGHT EVENTS     No acute events overnight. Pt says that he is not in any new pain. Continues to lay still in bed. Participates in physical therapy. Denies nausea, vomiting, constipation, diarrhea, bleeding. Hematoma of tongue looks stable.    MEDICATIONS  (STANDING):  amLODIPine   Tablet 10 milliGRAM(s) Oral daily  atorvastatin 40 milliGRAM(s) Oral at bedtime  docusate sodium 100 milliGRAM(s) Oral daily  ferrous    sulfate 325 milliGRAM(s) Oral daily  folic acid 1 milliGRAM(s) Oral daily  heparin  Infusion.  Unit(s)/Hr (11 mL/Hr) IV Continuous <Continuous>  hydrALAZINE 10 milliGRAM(s) Oral three times a day  influenza   Vaccine 0.5 milliLiter(s) IntraMuscular once  metoprolol tartrate 25 milliGRAM(s) Oral two times a day  polyethylene glycol 3350 17 Gram(s) Oral daily  senna 2 Tablet(s) Oral at bedtime  sevelamer hydrochloride 1600 milliGRAM(s) Oral every 12 hours    MEDICATIONS  (PRN):  heparin  Injectable 5000 Unit(s) IV Push every 6 hours PRN For aPTT less than 40  heparin  Injectable 2500 Unit(s) IV Push every 6 hours PRN For aPTT between 40 - 57  oxyCODONE    5 mG/acetaminophen 325 mG 1 Tablet(s) Oral every 6 hours PRN moderate to severe pain    CAPILLARY BLOOD GLUCOSE        I&O's Summary        Vital Signs Last 24 Hrs  T(C): 36.5 (19 Nov 2018 05:37), Max: 36.8 (18 Nov 2018 12:52)  T(F): 97.7 (19 Nov 2018 05:37), Max: 98.2 (18 Nov 2018 12:52)  HR: 72 (19 Nov 2018 05:37) (69 - 95)  BP: 158/70 (19 Nov 2018 05:37) (115/55 - 158/70)  BP(mean): --  RR: 16 (19 Nov 2018 05:37) (16 - 18)  SpO2: 96% (19 Nov 2018 05:37) (94% - 98%)    PHYSICAL EXAM:  GENERAL: NAD,   HEAD:  NC/AT  EYES: EOMI, white sclerae  ENMT: MMM, no oropharyngeal lesions or erythema appreciated, dentition well appearing  Neck: trachea midline, no appreciable masses  Pulm: normal work of breathing, CTA b/l  CV: S1&S2+, rrr, no m/r/g appreciated  ABDOMEN: soft, nt, nd,   : no cva tenderness, no nelson placed  EXTREMITIES:  no appreciable edema in b/l LE  Neuro: A&Ox3, no focal deficits  SKIN: warm and dry, no visible rash    LABS                        9.0    3.46  )-----------( 113      ( 18 Nov 2018 04:30 )             27.9                         9.3    3.62  )-----------( 130      ( 17 Nov 2018 14:08 )             28.2     11-18    136  |  97<L>  |  22  ----------------------------<  89  4.1   |  26  |  5.50<H>    Ca    8.8      18 Nov 2018 04:30      PT/INR - ( 18 Nov 2018 04:30 )   PT: 22.4 SEC;   INR: 1.98          PTT - ( 19 Nov 2018 01:40 )  PTT:101.6 SEC   14 Nov 2018 17:18 Troponin x     /  u/L / CKMB 1.41 ng/mL / CKMB Units x       14 Nov 2018 06:40 Troponin x     /  u/L / CKMB x     / CKMB Units x                    RADIOLOGY & ADDITIONAL TESTS:    Imaging Personally Reviewed:    Consultant(s) Notes Reviewed:      Care Discussed with Consultants/Other Providers:

## 2018-11-19 NOTE — PROGRESS NOTE ADULT - NSHPATTENDINGPLANDISCUSS_GEN_ALL_CORE
patient
patient and medical team
patient and house staff

## 2018-11-19 NOTE — DISCHARGE NOTE ADULT - COMMUNITY RESOURCES
[facility] [place] [phone], next [facility] dialysis session is scheduled for Wednesday 11/21/2018

## 2018-11-19 NOTE — PROGRESS NOTE ADULT - SUBJECTIVE AND OBJECTIVE BOX
Cardiology/Vascular Medicine Inpatient Progress Note    No active complaints  No complaints of CP, SOB, pain.  Currently on heparin gtt --> warfarin  Aim to keep INR at lower end of range closer to 2.0  CBCs have remained stable  Stable tongue hematoma    Vital Signs Last 24 Hrs  T(C): 36.5 (19 Nov 2018 05:37), Max: 36.8 (18 Nov 2018 12:52)  T(F): 97.7 (19 Nov 2018 05:37), Max: 98.2 (18 Nov 2018 12:52)  HR: 72 (19 Nov 2018 05:37) (69 - 95)  BP: 158/70 (19 Nov 2018 05:37) (115/55 - 158/70)  BP(mean): --  RR: 16 (19 Nov 2018 05:37) (16 - 18)  SpO2: 96% (19 Nov 2018 05:37) (94% - 98%)    Appearance: Chronically-ill appearing	  HEENT:  Poor dentition  Cardiovascular: Normal S1 S2, No JVD, 2/6 Sm, +click  Respiratory: Lungs clear to auscultation	  Psychiatry: Awake, but appears confused, Cantonese speaking  Gastrointestinal:  Soft, Non-tender, + BS	  Skin: No rashes, No ecchymoses, No cyanosis	  Neurologic: Non-focal    MEDICATIONS  (STANDING):  amLODIPine   Tablet 10 milliGRAM(s) Oral daily  atorvastatin 40 milliGRAM(s) Oral at bedtime  docusate sodium 100 milliGRAM(s) Oral daily  ferrous    sulfate 325 milliGRAM(s) Oral daily  folic acid 1 milliGRAM(s) Oral daily  heparin  Infusion.  Unit(s)/Hr (11 mL/Hr) IV Continuous <Continuous>  hydrALAZINE 10 milliGRAM(s) Oral three times a day  influenza   Vaccine 0.5 milliLiter(s) IntraMuscular once  metoprolol tartrate 25 milliGRAM(s) Oral two times a day  polyethylene glycol 3350 17 Gram(s) Oral daily  senna 2 Tablet(s) Oral at bedtime  sevelamer hydrochloride 1600 milliGRAM(s) Oral every 12 hours    MEDICATIONS  (PRN):  heparin  Injectable 5000 Unit(s) IV Push every 6 hours PRN For aPTT less than 40  heparin  Injectable 2500 Unit(s) IV Push every 6 hours PRN For aPTT between 40 - 57  oxyCODONE    5 mG/acetaminophen 325 mG 1 Tablet(s) Oral every 6 hours PRN moderate to severe pain      LABS:	 	                                         9.0    3.46  )-----------( 113      ( 18 Nov 2018 04:30 )             27.9     11-18    136  |  97<L>  |  22  ----------------------------<  89  4.1   |  26  |  5.50<H>    Ca    8.8      18 Nov 2018 04:30    PT/INR - ( 18 Nov 2018 04:30 )   PT: 22.4 SEC;   INR: 1.98     PTT - ( 19 Nov 2018 01:40 )  PTT:101.6 SEC             < from: CT Angio Abdomen and Pelvis w/ IV Cont (11.14.18 @ 07:48) >    EXAM:  CT ANGIO ABD PELV (W)AW IC        PROCEDURE DATE:  Nov 14 2018         INTERPRETATION:  CLINICAL INFORMATION: Severe back pain on   anticoagulation. Evaluate for abdominal aortic aneurysm.    COMPARISON: CT abdomen and pelvis from 7/1/2014. CT chest from 2/10/2018.    PROCEDURE:   CT Angiography of the Abdomen and Pelvis.  Arterial phase images were acquired.  Intravenous contrast: 90 ml Omnipaque 350. 10 ml discarded.  Oral contrast: None.  Sagittal and coronal reformats were performed as well as 3D (MIP)   reconstructions.    FINDINGS:    LOWER CHEST: Status post median sternotomy. Cardiomegaly with prior   atrial enlargement. Status post mitral valve replacement. Coronary artery   calcifications.    Consolidative opacities in the right lower lobe and bilateral lower lobe   patchy opacities, right greater than left likely atelectasis with   superimposed pneumonia.    Small right and trace left pleural effusion.    LIVER: Cirrhosis with few stable scattered hepatic cysts in the right   hepatic lobe.  BILE DUCTS: Normal caliber.  GALLBLADDER: Cholelithiasis.  SPLEEN: Within normal limits.  PANCREAS: 1.6 cm hypodense lesion in the neck of the pancreas. A 6 mm   hypodense lesion in the body of the pancreas is unchanged since prior   examination, and likely low-grade cystic pancreatic neoplasm such as IPMN.  ADRENALS: Within normal limits.  KIDNEYS/URETERS: Bilateral atrophic kidneys. Right renal cysts. Unchanged   several left intrarenal calculi. No hydronephrosis.    BLADDER: Underdistended with mildly thickened wall.  REPRODUCTIVE ORGANS: Prostate is normal in size.    BOWEL: No bowel obstruction. Appendix nonvisualized.  PERITONEUM: Large volume abdominal and pelvic ascites.  VESSELS:  Atherosclerotic changes. Abdominal aorta is normal in caliber.   The celiac axis, SMA and CARTER are patent. RETROPERITONEUM: No   lymphadenopathy.    ABDOMINAL WALL: Within normal limits.  BONES: Degenerative changes of the spine.    IMPRESSION:     *  No evidence of abdominal aorticaneurysm, dissection, or rupture.  *  Asymmetric enlargement of the left psoas muscle, new compared to prior   examination. Findings are concerning for retroperitoneal hematoma.  *  Consolidative opacities in the right lower lobe and patchy  opacities   in the bilateral lower lobe likely pneumonia with atelectasis.  *  Small right and trace left pleural effusion.  *  Large volume abdominal pelvic ascites.  *  A 1.6 cm hypodense lesion in the neck of the pancreas. Consider   further evaluation with MR in nonemergent basis.    Findings discussed with EILEEN Davidson at 9:32 AM on 11/14/2018.      RAISSA RIZZO M.D., RADIOLOGY RESIDENT  This document has been electronically signed.  LUIS RAMOS M.D., ATTENDING RADIOLOGIST  This document has been electronically signed. Nov 14 2018  9:33AM        < from: Transthoracic Echocardiogram (02.15.18 @ 10:34) >  Patient name: FILI RAMOS  YOB: 1945   Age: 72 (M)   MR#: [id number]  Study Date: 2/15/2018  Location: [facility] Sonographer: Sarah Reno RDCS  Study quality: Technically good  Referring Physician: Gayathri Puckett MD  Blood Pressure: 124/56 mmHg  Height: 165 cm  Weight: 63 kg  BSA: 1.7 m2  ------------------------------------------------------------------------  PROCEDURE: Transthoracic echocardiogram with 2-D, M-Mode  and complete spectral and color flow Doppler.  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 Minimal mitral regurgitation. Mean transmitral  valve gradient equals 10 mm Hg, which is elevated even in  the setting of a prosthetic valve.  Aortic Root: Normal aortic root.  Aortic Valve: Calcified trileaflet aortic valve with normal  opening. Mild aortic regurgitation.  Left Atrium: Severely dilated left atrium.  LA volume index  = 154 cc/m2.  Left Ventricle: Normal left ventricular systolic function.  No segmental wall motion abnormalities. Normal left  ventricular internal dimensions and wall thicknesses.  Right Heart: Severe right atrial enlargement. Right  ventricular enlargement with decreased right ventricular  systolic function. Normal tricuspid valve.  Moderate  tricuspid regurgitation. Normal pulmonic valve. Minimal  pulmonic regurgitation.  Pericardium/PleuraNormal pericardium with no pericardial  effusion.  Hemodynamic: Estimated right ventricular systolic pressure  equals 69 mm Hg, assuming right atrial pressure equals 10  mm Hg, consistent with severe pulmonary hypertension.  ------------------------------------------------------------------------  CONCLUSIONS:  1. Mechanical prosthetic mitral valve replacement. Minimal  mitral regurgitation. Mean transmitral valve gradient  equals 10 mm Hg, which is elevated even in the setting of a  prosthetic valve.  2. Calcified trileaflet aortic valve with normal opening.  Mild aortic regurgitation.  3. Severely dilated left atrium.  LA volume index = 154  cc/m2.  4. Normal left ventricular internal dimensions and wall  thicknesses.  5. Normal left ventricular systolic function. No segmental  wall motion abnormalities.  6. Severe right atrial enlargement.  7. Right ventricular enlargement with decreased right  ventricular systolic function.  8. Estimated right ventricular systolic pressure equals 69  mm Hg, assuming right atrial pressure equals 10 mm Hg,  consistent with severe pulmonary hypertension.  Consider MATTHEW for further evaluation of the prosthetic  mitral valve, if clinically indicated.  ------------------------------------------------------------------------  Confirmed on  2/15/2018 - 13:19:36 by Kaleb Luis M.D.  ------------------------------------------------------------------------    < end of copied text >

## 2018-11-19 NOTE — DISCHARGE NOTE ADULT - PLAN OF CARE
Treatment of hematoma We stopped your blood thinner when you first came to the hospital so the bleeding would end in your back. We then restarted it once your blood counts were at the correct level. Please continue taking your coumadin every day as prescribed. Management of CKD We continued your hemodialysis treatments while you were in the hospital. There were no complications related to your kidneys. Management of AFIB We continued your atrial fibrillation medications in the hospital. You experienced no issues with your afib. Please continue taking your heart medications as prescribed. Management of HTN We continued your blood pressure medications while you were in the hospital. Please continue taking your blood pressure medications at home as prescribed. Rule out ACS While you were in the hospital, we ruled out any issues with your heart. Please continue seeing your cardiologist. We stopped your blood thinner when you first came to the hospital so the bleeding would end in your back. We then restarted it once your blood counts were at the correct level. Please continue taking your coumadin starting Wednesday, the 21st. Then please check your INR at your dialysis center on Friday.

## 2018-11-19 NOTE — DISCHARGE NOTE ADULT - MEDICATION SUMMARY - MEDICATIONS TO TAKE
I will START or STAY ON the medications listed below when I get home from the hospital:    oxyCODONE-acetaminophen 5 mg-325 mg oral tablet  -- 1 tab(s) by mouth every 6 hours, As Needed -moderate to severe pain MDD:4 tabs  -- Indication: For Retroperitoneal hematoma    Coumadin 2.5 mg oral tablet  -- 1 tab(s) by mouth once a day  -- Indication: For Atrial fibrillation    atorvastatin 40 mg oral tablet  -- 1 tab(s) by mouth once a day  -- Indication: For Prophylactic measure    metoprolol succinate 25 mg oral tablet, extended release  -- 1 tab(s) by mouth once a day  -- Indication: For Atrial fibrillation    metoprolol succinate 50 mg oral capsule, extended release  -- 1 cap(s) by mouth once a day  -- Indication: For Atrial fibrillation    amLODIPine 10 mg oral tablet  -- 1 tab(s) by mouth once a day  -- Indication: For Hypertension    epoetin angelica  -- 4000 unit(s) intravenous 2 times a week with HD  -- Indication: For Chronic kidney disease-mineral and bone disorder    sevelamer carbonate 800 mg oral tablet  -- 2 tab(s) by mouth 2 times a day  -- Indication: For Chronic kidney disease-mineral and bone disorder    Velphoro  -- 500 milligram(s) by mouth 3 times a day (before meals)  -- Indication: For Chronic kidney disease-mineral and bone disorder    hydrALAZINE 10 mg oral tablet  -- 1 tab(s) by mouth 3 times a day  -- Indication: For Hypertension    folic acid  -- 1 milligram(s) by mouth once a day  -- Indication: For Chronic kidney disease-mineral and bone disorder

## 2018-11-19 NOTE — DISCHARGE NOTE ADULT - PATIENT PORTAL LINK FT
You can access the [facility] Patient Portal, offered by [facility], by registering with the following website: [email or web address]

## 2018-11-20 VITALS
TEMPERATURE: 97 F | SYSTOLIC BLOOD PRESSURE: 111 MMHG | OXYGEN SATURATION: 97 % | HEART RATE: 92 BPM | RESPIRATION RATE: 17 BRPM | DIASTOLIC BLOOD PRESSURE: 75 MMHG

## 2018-11-20 LAB
APTT BLD: 50.8 SEC — HIGH (ref 27.5–36.3)
BUN SERPL-MCNC: 21 MG/DL — SIGNIFICANT CHANGE UP (ref 7–23)
CALCIUM SERPL-MCNC: 8.8 MG/DL — SIGNIFICANT CHANGE UP (ref 8.4–10.5)
CHLORIDE SERPL-SCNC: 95 MMOL/L — LOW (ref 98–107)
CO2 SERPL-SCNC: 25 MMOL/L — SIGNIFICANT CHANGE UP (ref 22–31)
CREAT SERPL-MCNC: 5.19 MG/DL — HIGH (ref 0.5–1.3)
GLUCOSE SERPL-MCNC: 84 MG/DL — SIGNIFICANT CHANGE UP (ref 70–99)
HCT VFR BLD CALC: 27.2 % — LOW (ref 39–50)
HGB BLD-MCNC: 8.6 G/DL — LOW (ref 13–17)
INR BLD: 4.41 — HIGH (ref 0.88–1.17)
MAGNESIUM SERPL-MCNC: 2.1 MG/DL — SIGNIFICANT CHANGE UP (ref 1.6–2.6)
MCHC RBC-ENTMCNC: 30.5 PG — SIGNIFICANT CHANGE UP (ref 27–34)
MCHC RBC-ENTMCNC: 31.6 % — LOW (ref 32–36)
MCV RBC AUTO: 96.5 FL — SIGNIFICANT CHANGE UP (ref 80–100)
NRBC # FLD: 0 — SIGNIFICANT CHANGE UP
PHOSPHATE SERPL-MCNC: 3.8 MG/DL — SIGNIFICANT CHANGE UP (ref 2.5–4.5)
PLATELET # BLD AUTO: 129 K/UL — LOW (ref 150–400)
PMV BLD: 9.5 FL — SIGNIFICANT CHANGE UP (ref 7–13)
POTASSIUM SERPL-MCNC: 4 MMOL/L — SIGNIFICANT CHANGE UP (ref 3.5–5.3)
POTASSIUM SERPL-SCNC: 4 MMOL/L — SIGNIFICANT CHANGE UP (ref 3.5–5.3)
PROTHROM AB SERPL-ACNC: 51.3 SEC — HIGH (ref 9.8–13.1)
RBC # BLD: 2.82 M/UL — LOW (ref 4.2–5.8)
RBC # FLD: 15.3 % — HIGH (ref 10.3–14.5)
SODIUM SERPL-SCNC: 134 MMOL/L — LOW (ref 135–145)
WBC # BLD: 3.5 K/UL — LOW (ref 3.8–10.5)
WBC # FLD AUTO: 3.5 K/UL — LOW (ref 3.8–10.5)

## 2018-11-20 PROCEDURE — 99232 SBSQ HOSP IP/OBS MODERATE 35: CPT

## 2018-11-20 PROCEDURE — 99233 SBSQ HOSP IP/OBS HIGH 50: CPT | Mod: GC

## 2018-11-20 RX ADMIN — POLYETHYLENE GLYCOL 3350 17 GRAM(S): 17 POWDER, FOR SOLUTION ORAL at 11:29

## 2018-11-20 NOTE — PROGRESS NOTE ADULT - ASSESSMENT
74 yo Cantonese speaking man with history of mechanical valve, AFib on Coumadin, HTN, ESRD (MWF, most recent dialysis on Monday) and cirrhosis presented with acute onset of lower left back pain found to have a supratherapeutic INR and retroperitoneal bleed      Problem/Plan - 1:  ·  Problem: Retroperitoneal hematoma.  Given vitamin K for supratherapeutic INR, now nearly therapeutic INR  After consultation with surgery, okay to restart anticoagulation with close monitoring of CBC which has remained stable   Supratherapeutic INR this AM  Hold warfarin, monitor INR.  INR goal 2-3,keep closer to INR ~2 lower end of range  Hgb/Hct remains stable       Problem/Plan - 2:  ·  Problem: Atrial fibrillation.  On anticoagulation.  - On Lopressor 25mg BID        Problem/Plan - 3:  ·  Problem: Troponin level elevated.  Plan: - Most likely 2/2 CKD.  Continue supportive management.

## 2018-11-20 NOTE — PROGRESS NOTE ADULT - PROBLEM SELECTOR PLAN 8
On CT scan upon admission  - B/l patchy opacities in lower lobes of lungs likely atelectasis vs. ?PNA. Pt without respiratory complaints, no infectious symptoms.  - A 1.6 cm hypodense lesion in the neck of the pancreas. Consider   further evaluation with MR in nonemergent basis as outpt

## 2018-11-20 NOTE — PROGRESS NOTE ADULT - REASON FOR ADMISSION
Anticoagulation management
RP bleed
RP hematoma
RP bleed
RP bleed
RP hematoma

## 2018-11-20 NOTE — PROGRESS NOTE ADULT - PROBLEM SELECTOR PROBLEM 5
Troponin level elevated

## 2018-11-20 NOTE — PROGRESS NOTE ADULT - PROBLEM SELECTOR PLAN 3
- On metoprolol succinate 25mg qdaily and 50 mg q daily at home  - Will c/w lopressor 25mg BID for now  - C/w coumadin

## 2018-11-20 NOTE — PROGRESS NOTE ADULT - PROBLEM SELECTOR PROBLEM 8
Imaging abnormality

## 2018-11-20 NOTE — PROGRESS NOTE ADULT - SUBJECTIVE AND OBJECTIVE BOX
Patient is a 73y old  Male who presents with a chief complaint of RP hematoma (19 Nov 2018 14:20)      SUBJECTIVE/OVERNIGHT EVENTS     No acute events overnight. Pt was slated for discharge yesterday, but wife refused to take pt home because she thinks he is in too much pain. Talked to pt before discharge and he said he would rather go home with short course pain meds. Denies nausea, vomiting, constipation, diarrhea.    MEDICATIONS  (STANDING):  amLODIPine   Tablet 10 milliGRAM(s) Oral daily  atorvastatin 40 milliGRAM(s) Oral at bedtime  docusate sodium 100 milliGRAM(s) Oral daily  ferrous    sulfate 325 milliGRAM(s) Oral daily  folic acid 1 milliGRAM(s) Oral daily  hydrALAZINE 10 milliGRAM(s) Oral three times a day  influenza   Vaccine 0.5 milliLiter(s) IntraMuscular once  metoprolol tartrate 25 milliGRAM(s) Oral two times a day  polyethylene glycol 3350 17 Gram(s) Oral daily  senna 2 Tablet(s) Oral at bedtime  sevelamer hydrochloride 1600 milliGRAM(s) Oral every 12 hours    MEDICATIONS  (PRN):  oxyCODONE    5 mG/acetaminophen 325 mG 1 Tablet(s) Oral every 6 hours PRN moderate to severe pain    CAPILLARY BLOOD GLUCOSE        I&O's Summary    19 Nov 2018 07:01  -  20 Nov 2018 07:00  --------------------------------------------------------  IN: 600 mL / OUT: 2600 mL / NET: -2000 mL          Vital Signs Last 24 Hrs  T(C): 36.7 (20 Nov 2018 04:51), Max: 37.3 (20 Nov 2018 01:30)  T(F): 98.1 (20 Nov 2018 04:51), Max: 99.1 (20 Nov 2018 01:30)  HR: 87 (20 Nov 2018 04:51) (73 - 94)  BP: 147/74 (20 Nov 2018 04:51) (121/54 - 154/63)  BP(mean): --  RR: 17 (20 Nov 2018 04:51) (16 - 18)  SpO2: 96% (20 Nov 2018 04:51) (95% - 99%)    PHYSICAL EXAM:  General: NAD, poor dentition  Neurology: A&Ox3, nonfocal  Respiratory: CTABL  CV: RRR, S1S2, systolic mumur  GI: abdomen soft, NT, ND, +BS  MSK: pain to palpation of L flank  Extremities: No edema, + peripheral pulses, large venous aneurysm of LUE  Skin: warm and dry    LABS                        8.6    3.50  )-----------( 129      ( 20 Nov 2018 05:43 )             27.2                         9.1    3.61  )-----------( 138      ( 19 Nov 2018 08:00 )             27.9     11-20    134<L>  |  95<L>  |  21  ----------------------------<  84  4.0   |  25  |  5.19<H>  11-19    135  |  96<L>  |  36<H>  ----------------------------<  85  4.5   |  25  |  7.55<H>    Ca    8.8      20 Nov 2018 05:43  Ca    9.1      19 Nov 2018 08:00  Phos  3.8     11-20  Mg     2.1     11-20    TPro  7.5  /  Alb  3.0<L>  /  TBili  1.1  /  DBili  x   /  AST  22  /  ALT  5   /  AlkPhos  66  11-19    PT/INR - ( 20 Nov 2018 05:43 )   PT: 51.3 SEC;   INR: 4.41          PTT - ( 20 Nov 2018 05:43 )  PTT:50.8 SEC   14 Nov 2018 17:18 Troponin x     /  u/L / CKMB 1.41 ng/mL / CKMB Units x       14 Nov 2018 06:40 Troponin x     /  u/L / CKMB x     / CKMB Units x                    RADIOLOGY & ADDITIONAL TESTS:    Imaging Personally Reviewed:    Consultant(s) Notes Reviewed:      Care Discussed with Consultants/Other Providers:

## 2018-11-20 NOTE — PROGRESS NOTE ADULT - PROBLEM SELECTOR PROBLEM 3
Anemia due to chronic kidney disease
Atrial fibrillation

## 2018-11-20 NOTE — PROGRESS NOTE ADULT - PROBLEM SELECTOR PROBLEM 1
ESRD (end stage renal disease) on dialysis
Retroperitoneal hematoma

## 2018-11-20 NOTE — PROGRESS NOTE ADULT - PROBLEM SELECTOR PLAN 7
- There is large volume pelvic ascites on CT scan  - Likely outpatient f/u

## 2018-11-20 NOTE — PROGRESS NOTE ADULT - PROBLEM SELECTOR PROBLEM 2
ESRD (end stage renal disease) on dialysis
HTN (hypertension)
ESRD (end stage renal disease) on dialysis

## 2018-11-20 NOTE — PROGRESS NOTE ADULT - PROBLEM SELECTOR PLAN 1
- INR slightly supratherapeutic this AM. Will tell to not take PM dose if he is discharged tonight. Plan to discharge on home dose 2.5mg.  - Abd CT:  Asymmetric enlargement of the left psoas muscle, new compared to prior examination. Findings are concerning for retroperitoneal hematoma.  - Surgery consulted. No surgical intervention indicated, appreciate recs.  - s/p vitamin K reversal. S/p heparin to coumadin bridge. Will resume 2.5mg coumadin.  -Trend CBC q12hrs, and daily INR  - Keep transfusion goal >7  - If ongoing active bleeding, will consult IR  - Pain control w/ percocet 5mg q6 for now

## 2018-11-20 NOTE — PROGRESS NOTE ADULT - PROVIDER SPECIALTY LIST ADULT
Internal Medicine
Nephrology
Surgery
Vascular Cardiology
Internal Medicine

## 2018-11-20 NOTE — PROGRESS NOTE ADULT - SUBJECTIVE AND OBJECTIVE BOX
Cardiology/Vascular Medicine Inpatient Progress Note    No active complaints  Back pain stable, received analgesics overnight  Per nursing staff, patient ambulated with assistance to bathroom without issues  Supratherapeutic INR this AM to 4.41  Will need to hold warfarin and monitor INR  No signs of active bleeding at this time    Vital Signs Last 24 Hrs  T(C): 35.9 (20 Nov 2018 09:06), Max: 37.3 (20 Nov 2018 01:30)  T(F): 96.6 (20 Nov 2018 09:06), Max: 99.1 (20 Nov 2018 01:30)  HR: 82 (20 Nov 2018 09:06) (73 - 94)  BP: 144/67 (20 Nov 2018 09:06) (121/54 - 154/63)  BP(mean): --  RR: 16 (20 Nov 2018 09:06) (16 - 18)  SpO2: 95% (20 Nov 2018 09:06) (95% - 99%)    Appearance: Chronically-ill appearing, laying flat in bed, NAD	  HEENT:  Poor dentition  Cardiovascular: Normal S1 S2, No JVD, 2/6 Sm, +click  Respiratory: Lungs clear to auscultation	  Psychiatry: Awake, but appears confused, Cantonese speaking  Gastrointestinal:  Soft, Non-tender, + BS	  Skin: No rashes, No ecchymoses, No cyanosis	  Neurologic: Non-focal    MEDICATIONS  (STANDING):  amLODIPine   Tablet 10 milliGRAM(s) Oral daily  atorvastatin 40 milliGRAM(s) Oral at bedtime  docusate sodium 100 milliGRAM(s) Oral daily  ferrous    sulfate 325 milliGRAM(s) Oral daily  folic acid 1 milliGRAM(s) Oral daily  hydrALAZINE 10 milliGRAM(s) Oral three times a day  influenza   Vaccine 0.5 milliLiter(s) IntraMuscular once  metoprolol tartrate 25 milliGRAM(s) Oral two times a day  polyethylene glycol 3350 17 Gram(s) Oral daily  senna 2 Tablet(s) Oral at bedtime  sevelamer hydrochloride 1600 milliGRAM(s) Oral every 12 hours    MEDICATIONS  (PRN):  oxyCODONE    5 mG/acetaminophen 325 mG 1 Tablet(s) Oral every 6 hours PRN moderate to severe pain    LABS:	 	                                      8.6    3.50  )-----------( 129      ( 20 Nov 2018 05:43 )             27.2     11-20    134<L>  |  95<L>  |  21  ----------------------------<  84  4.0   |  25  |  5.19<H>    Ca    8.8      20 Nov 2018 05:43  Phos  3.8     11-20  Mg     2.1     11-20    TPro  7.5  /  Alb  3.0<L>  /  TBili  1.1  /  DBili  x   /  AST  22  /  ALT  5   /  AlkPhos  66  11-19    PT/INR - ( 20 Nov 2018 05:43 )   PT: 51.3 SEC;   INR: 4.41     PTT - ( 20 Nov 2018 05:43 )  PTT:50.8 SEC        < from: CT Angio Abdomen and Pelvis w/ IV Cont (11.14.18 @ 07:48) >    EXAM:  CT ANGIO ABD PELV (W)AW IC        PROCEDURE DATE:  Nov 14 2018         INTERPRETATION:  CLINICAL INFORMATION: Severe back pain on   anticoagulation. Evaluate for abdominal aortic aneurysm.    COMPARISON: CT abdomen and pelvis from 7/1/2014. CT chest from 2/10/2018.    PROCEDURE:   CT Angiography of the Abdomen and Pelvis.  Arterial phase images were acquired.  Intravenous contrast: 90 ml Omnipaque 350. 10 ml discarded.  Oral contrast: None.  Sagittal and coronal reformats were performed as well as 3D (MIP)   reconstructions.    FINDINGS:    LOWER CHEST: Status post median sternotomy. Cardiomegaly with prior   atrial enlargement. Status post mitral valve replacement. Coronary artery   calcifications.    Consolidative opacities in the right lower lobe and bilateral lower lobe   patchy opacities, right greater than left likely atelectasis with   superimposed pneumonia.    Small right and trace left pleural effusion.    LIVER: Cirrhosis with few stable scattered hepatic cysts in the right   hepatic lobe.  BILE DUCTS: Normal caliber.  GALLBLADDER: Cholelithiasis.  SPLEEN: Within normal limits.  PANCREAS: 1.6 cm hypodense lesion in the neck of the pancreas. A 6 mm   hypodense lesion in the body of the pancreas is unchanged since prior   examination, and likely low-grade cystic pancreatic neoplasm such as IPMN.  ADRENALS: Within normal limits.  KIDNEYS/URETERS: Bilateral atrophic kidneys. Right renal cysts. Unchanged   several left intrarenal calculi. No hydronephrosis.    BLADDER: Underdistended with mildly thickened wall.  REPRODUCTIVE ORGANS: Prostate is normal in size.    BOWEL: No bowel obstruction. Appendix nonvisualized.  PERITONEUM: Large volume abdominal and pelvic ascites.  VESSELS:  Atherosclerotic changes. Abdominal aorta is normal in caliber.   The celiac axis, SMA and CARTER are patent. RETROPERITONEUM: No   lymphadenopathy.    ABDOMINAL WALL: Within normal limits.  BONES: Degenerative changes of the spine.    IMPRESSION:     *  No evidence of abdominal aorticaneurysm, dissection, or rupture.  *  Asymmetric enlargement of the left psoas muscle, new compared to prior   examination. Findings are concerning for retroperitoneal hematoma.  *  Consolidative opacities in the right lower lobe and patchy  opacities   in the bilateral lower lobe likely pneumonia with atelectasis.  *  Small right and trace left pleural effusion.  *  Large volume abdominal pelvic ascites.  *  A 1.6 cm hypodense lesion in the neck of the pancreas. Consider   further evaluation with MR in nonemergent basis.    Findings discussed with EILEEN Davidson at 9:32 AM on 11/14/2018.      RAISSA RIZZO M.D., RADIOLOGY RESIDENT  This document has been electronically signed.  LUIS RAMOS M.D., ATTENDING RADIOLOGIST  This document has been electronically signed. Nov 14 2018  9:33AM        < from: Transthoracic Echocardiogram (02.15.18 @ 10:34) >  Patient name: FILI RAMOS  YOB: 1945   Age: 72 (M)   MR#: [id number]  Study Date: 2/15/2018  Location: [facility] Sonographer: Sarah Reno RDCS  Study quality: Technically good  Referring Physician: Gayathri Puckett MD  Blood Pressure: 124/56 mmHg  Height: 165 cm  Weight: 63 kg  BSA: 1.7 m2  ------------------------------------------------------------------------  PROCEDURE: Transthoracic echocardiogram with 2-D, M-Mode  and complete spectral and color flow Doppler.  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 Minimal mitral regurgitation. Mean transmitral  valve gradient equals 10 mm Hg, which is elevated even in  the setting of a prosthetic valve.  Aortic Root: Normal aortic root.  Aortic Valve: Calcified trileaflet aortic valve with normal  opening. Mild aortic regurgitation.  Left Atrium: Severely dilated left atrium.  LA volume index  = 154 cc/m2.  Left Ventricle: Normal left ventricular systolic function.  No segmental wall motion abnormalities. Normal left  ventricular internal dimensions and wall thicknesses.  Right Heart: Severe right atrial enlargement. Right  ventricular enlargement with decreased right ventricular  systolic function. Normal tricuspid valve.  Moderate  tricuspid regurgitation. Normal pulmonic valve. Minimal  pulmonic regurgitation.  Pericardium/PleuraNormal pericardium with no pericardial  effusion.  Hemodynamic: Estimated right ventricular systolic pressure  equals 69 mm Hg, assuming right atrial pressure equals 10  mm Hg, consistent with severe pulmonary hypertension.  ------------------------------------------------------------------------  CONCLUSIONS:  1. Mechanical prosthetic mitral valve replacement. Minimal  mitral regurgitation. Mean transmitral valve gradient  equals 10 mm Hg, which is elevated even in the setting of a  prosthetic valve.  2. Calcified trileaflet aortic valve with normal opening.  Mild aortic regurgitation.  3. Severely dilated left atrium.  LA volume index = 154  cc/m2.  4. Normal left ventricular internal dimensions and wall  thicknesses.  5. Normal left ventricular systolic function. No segmental  wall motion abnormalities.  6. Severe right atrial enlargement.  7. Right ventricular enlargement with decreased right  ventricular systolic function.  8. Estimated right ventricular systolic pressure equals 69  mm Hg, assuming right atrial pressure equals 10 mm Hg,  consistent with severe pulmonary hypertension.  Consider MATTHEW for further evaluation of the prosthetic  mitral valve, if clinically indicated.  ------------------------------------------------------------------------  Confirmed on  2/15/2018 - 13:19:36 by Kaleb Luis M.D.  ------------------------------------------------------------------------    < end of copied text >

## 2018-11-20 NOTE — PROGRESS NOTE ADULT - PROBLEM SELECTOR PROBLEM 6
Anemia due to chronic kidney disease

## 2018-11-20 NOTE — PROGRESS NOTE ADULT - ATTENDING COMMENTS
ONEAL tomorrow
discussed with son at bedside . CT results shared with him. please share findings with his PMD Dr. Vic Manzo [phone]
Patient seen and examined. Chart and labs reviewed.   73M Cantonese speaking, h/o HTN, ESRD on HD (MWF) at [facility], Afib/MVR on coumadin, cirrhosis, p/w acute onset L lower back pain, found to have RP bleed in the setting of high INR 4.7.   CT angio asymmetric L psoas muscle enlargement c/w RP hematoma.  Pt appears much better today, pain improved and pt requesting to go home.  Planned for discharge yesterday, however wife refused to take him home.      Assessment/plan:   # Back pain due to RP bleed in the setting of supratherapeutic INR: H/H remains stable, hemodynamically stable.  No further evidence of expanding hematoma.  Supratherapeutic INR today, will recommend holding doses until INR is rechecked on Friday as outpatient.  Aim for goal INR of 2-3.   # acute blood loss anemia in the setting of RP hematoma: hct stable, monitro CBC, no indication for transfusion   # ESRD: HD as scheduled  # Afib/mech MVR: INR now supratherapeutic, hold Coumadin dosing tonight through Thursday and have INR rechecked Friday for goal INR of 2-3.   # HTN: cont home meds  # dispo: medically stable for d/c home today.  D/C time: 35 minutes including discussion of f/u care.
Patient seen and examined. Chart and labs reviewed.   73M Cantonese speaking, h/o HTN, ESRD on HD (MWF) at [facility], Afib/MVR on coumadin, cirrhosis, p/w acute onset L lower back pain, found to have RP bleed in the setting of high INR 4.7.   CT angio asymmetric L psoas muscle enlargement c/w RP hematoma.  Today pt continues to have pain the L lower back, which he states has been there since admission, not getting any worse, but not much better either.  States that he would like to go home and manage his pain with oral medications.     Assessment/plan:   # Back pain due to RP bleed in the setting of supratherapeutic INR: H/H remains stable, hemodynamically stable.  No further evidence of expanding hematoma.  # acute blood loss anemia in the setting of RP hematoma: hct stable, monitro CBC, no indication for transfusion   # ESRD: HD as scheduled  # Afib/mech MVR: INR now therapeutic, resume home Coumadin dosing of 2.5mg.    # HTN: cont home meds  # dispo: medically stable for d/c home today.  D/C time: 35 minutes including discussion of f/u care and pain control.
Patient seen and examined. Chart/lab reviewed. Agree with above with modifications.  73M Cantonese speaking, h/o HTN, ESRD on HD (MWF) at [facility], Afib/MVR on coumadin, cirrhosis, p/w acute onset L lower back pain, found to have RP bleed in the setting of high INR 4.7.   CT angio asymmetric L psoas muscle enlargement c/w RP hematoma    Assessment/plan:   # Back pain due to RP bleed  in the setting of supratherapeutic INR:   back pain controlled, no evidence of active RP bleed, no surgical intervention    if rebleeds consult IR for embolization, no surgical intervention  # acute blood loss anemia in the setting of RP hematoma: monitor CBC, transfuse prn to keep Hgb>8 or symptoms   # ESRD: HD as scheduled, f/u renal  # Afib/mech MVR: INR now subtherapeutic, 5 mg coumadin today, aim INR 2.5-3.5, on heparin bridge, f/u cardio  # HTN: cont home meds  # dispo: PT f/u, d/c plan home when INR therapeutic
Patient seen and examined. Chart/lab reviewed. Agree with above with modifications.  73M Cantonese speaking, h/o HTN, ESRD on HD (MWF) at [facility], Afib/MVR on coumadin, cirrhosis, p/w acute onset L lower back pain, found to have RP bleed in the setting of high INR 4.7.   CT angio asymmetric L psoas muscle enlargement c/w RP hematoma    Assessment/plan:   # Back pain due to RP bleed  in the setting of supratherapeutic INR:   back pain controlled, no evidence of active RP bleed, no surgical intervention   # acute blood loss anemia in the setting of RP hematoma: hct stable, monitro CBC, no indication for transfusion   # ESRD: HD as scheduled, f/u renal  # Afib/mech MVR: INR now subtherapeutic, 4 mg coumadin today (home dose 2.5mg), aim INR 2.5-3.5, on heparin bridge, f/u cardio  # HTN: cont home meds  # dispo: PT f/u, d/c plan home when INR therapeutic
Patient seen and examined. Chart/lab reviewed. Agree with above with modifications.  73M Cantonese speaking, h/o HTN, ESRD on HD (MWF) at [facility], Afib/MVR on coumadin, cirrhosis, p/w acute onset L lower back pain, found to have RP bleed in the setting of supratherapeutic INR 4.7. He's hemodynamically stable, back pain better, getting dialysis today.    PE: nontoxic, L back pain with movement,  heart irregular, mech click; abdomen soft, back: L lower ; extrem: no leg edema, b/l arm AVF (right forearm AVF +bruit, L AVF with aneurysmal dilatation)  CT angio asymmetric L psoas muscle enlargement c/w RP hematoma  Assessment/plan:   # Back pain due to RP bleed in the setting of supratherapeutic INR: hct stable but INR trending up to 6.1, case d/w cardiology Dr. Garcia, will give vit 2.5 mg x1,  daily PT/INR, no AC reversal as pt is hemodynamically stable with mechanical valve, if rebleeds consult IR for embolization, surgery consult appreciated, no surgical intervention  # acute blood loss anemia in the setting of RP hematoma: monitor CBC, transfuse prn to keep Hgb>8 or symptoms   # ESRD: HD as scheduled, f/u renal  # Afib/mech MVR: hold coumadin, daily PT/INR, aim INR 2.5-3.5, f/u cardio, ok to give vit K 2.5 mg today per Dr. Garcia  # HTN: cont home meds
Patient seen and examined. Chart/lab reviewed. Agree with above with modifications.  73M Cantonese speaking, h/o HTN, ESRD on HD (MWF) at [facility], Afib/MVR on coumadin, cirrhosis, p/w acute onset L lower back pain, found to have RP bleed in the setting of supratherapeutic INR 4.7.     PE: nontoxic, L back pain with movement,  heart irregular, mech click; abdomen soft, back: L lower ; extrem: no leg edema, b/l arm AVF (right forearm AVF +bruit, L AVF with aneurysmal dilatation)  CT angio asymmetric L psoas muscle enlargement c/w RP hematoma  Assessment/plan:   # Back pain due to RP bleed in the setting of supratherapeutic INR:   INR down to 1.8 today s/p vit K 2.5 mg, case d/w surgery and cardio Dr. Garcia, will resume coumadin with heparin bridge as pt has mechanical valve   if rebleeds consult IR for embolization, no surgical intervention  # acute blood loss anemia in the setting of RP hematoma: monitor CBC, transfuse prn to keep Hgb>8 or symptoms   # ESRD: HD as scheduled, f/u renal  # Afib/mech MVR: INR 1.8, resume coumadin with heparin drip as per above, d/w surgery/cardio Dr. Garcia  # HTN: cont home meds  # dispo: PT f/u, d/c plan home when INR therapeutic

## 2018-11-20 NOTE — PROGRESS NOTE ADULT - PROBLEM SELECTOR PLAN 5
- Most likely 2/2 CKD, possible demand ischemia from hemorrhage  - Pt without CP, SOB  - EKG with Afib with incomplete RBBB

## 2018-11-20 NOTE — PROGRESS NOTE ADULT - PROBLEM SELECTOR PLAN 2
- Received HD M,W,F  - Okay to use L arm for blood draws, as it has non-functioning fistula. No draws on fistula itself, per Dr. Broussard.  -No issues w/ HD.

## 2018-11-20 NOTE — PROGRESS NOTE ADULT - PROBLEM SELECTOR PROBLEM 4
Hypertension
Chronic kidney disease-mineral and bone disorder
Hypertension

## 2019-05-11 NOTE — H&P ADULT - FAMILY HISTORY
Mother  Still living? Unknown  Maternal family history of cancer, Age at diagnosis: Age Unknown denies pain/discomfort

## 2020-01-02 NOTE — PROGRESS NOTE ADULT - SUBJECTIVE AND OBJECTIVE BOX
RX e-scribed by provider   CHIEF COMPLAINT: Patient is a 72y old  male who presents with a chief complaint of Cough (10 Feb 2018 03:15)    SUBJECTIVE / OVERNIGHT EVENTS:    Patient reports feeling well. No new complaints Denies abdominal pain. Denies SOB. No other complaints. Wants to go home.     MEDICATIONS  (STANDING):  atorvastatin 40 milliGRAM(s) Oral at bedtime  donepezil 5 milliGRAM(s) Oral at bedtime  epoetin angelica Injectable 4000 Unit(s) IV Push <User Schedule>  folic acid 1 milliGRAM(s) Oral daily  lactobacillus acidophilus 1 Tablet(s) Oral every 12 hours  metoprolol succinate ER 25 milliGRAM(s) Oral daily  sevelamer hydrochloride 2400 milliGRAM(s) Oral two times a day with meals  warfarin 2 milliGRAM(s) Oral once    MEDICATIONS  (PRN):  guaiFENesin   Syrup  (Sugar-Free) 200 milliGRAM(s) Oral every 6 hours PRN Cough    Vital Signs Last 24 Hrs  T(C): 36.7 (18 Feb 2018 06:14), Max: 36.7 (18 Feb 2018 06:14)  T(F): 98 (18 Feb 2018 06:14), Max: 98 (18 Feb 2018 06:14)  HR: 78 (18 Feb 2018 06:14) (70 - 85)  BP: 146/56 (18 Feb 2018 06:14) (145/75 - 153/88)  BP(mean): --  RR: 18 (18 Feb 2018 06:14) (15 - 18)  SpO2: 100% (18 Feb 2018 06:14) (98% - 100%)      PHYSICAL EXAM:  GENERAL: NAD, well-developed  HEAD:  Atraumatic, Normocephalic  EYES: EOMI  NECK: Supple, No JVD  CHEST/LUNG: Decreased bibasilar BS's   HEART: nl S1/S2  ABDOMEN: nondistended, soft  EXTREMITIES: RUE AVF clean, no active bleeding  SKIN: No rashes noted    LABS:                        7.1    4.21  )-----------( 205      ( 18 Feb 2018 06:37 )             21.9   02-18    133<L>  |  91<L>  |  45<H>  ----------------------------<  85  4.4   |  25  |  8.18<H>    Ca    8.3<L>      18 Feb 2018 06:30  Mg     2.2     02-18      RADIOLOGY & ADDITIONAL TESTS:    Imaging Personally Reviewed:    [ ] Consultant(s) Notes Reviewed:  [ ] Care Discussed with Consultants/Other Providers: Tele PA

## 2022-08-19 NOTE — PROGRESS NOTE ADULT - SUBJECTIVE AND OBJECTIVE BOX
Cardiology/Vascular Medicine Inpatient Progress Note    No new events/complaints.    Telemetry: Afib 60s-70s bpm  INR: 2.06     On warfarin.  No issues with bleeding.  Now off abx, steroids.    Vital Signs Last 24 Hrs  T(C): 36.2 (20 Feb 2018 13:32), Max: 36.7 (20 Feb 2018 05:01)  T(F): 97.1 (20 Feb 2018 13:32), Max: 98.1 (20 Feb 2018 05:01)  HR: 89 (20 Feb 2018 13:32) (62 - 89)  BP: 147/68 (20 Feb 2018 13:32) (147/56 - 152/68)  BP(mean): --  RR: 18 (20 Feb 2018 13:32) (18 - 18)  SpO2: 99% (20 Feb 2018 13:32) (97% - 99%)    Appearance: Elderly man, NAD  HEENT:   Normal oral mucosa, PERRL, EOMI	  Lymphatic: No lymphadenopathy  Cardiovascular: Irreg irreg S1 S2, No JVD, +click  Respiratory: Decreased BS b/l  Psychiatry: awake, able to answer questions  Gastrointestinal:  Soft, Non-tender, + BS	  Skin: +ecchymoses  Neurologic: Non-focal  Extremities: Normal range of motion, No clubbing, cyanosis or edema  Vascular: Peripheral pulses palpable 2+ bilaterally    MEDICATIONS  (STANDING):  atorvastatin 40 milliGRAM(s) Oral at bedtime  donepezil 5 milliGRAM(s) Oral at bedtime  epoetin angelica Injectable 4000 Unit(s) IV Push <User Schedule>  folic acid 1 milliGRAM(s) Oral daily  lactobacillus acidophilus 1 Tablet(s) Oral every 12 hours  metoprolol succinate ER 25 milliGRAM(s) Oral daily  sevelamer hydrochloride 2400 milliGRAM(s) Oral two times a day with meals  warfarin 3 milliGRAM(s) Oral once    MEDICATIONS  (PRN):  guaiFENesin   Syrup  (Sugar-Free) 200 milliGRAM(s) Oral every 6 hours PRN Cough                            8.4    4.41  )-----------( 221      ( 20 Feb 2018 06:00 )             25.2       02-20    133<L>  |  91<L>  |  34<H>  ----------------------------<  80  4.5   |  27  |  7.16<H>    Ca    8.3<L>      20 Feb 2018 06:00  Mg     2.2     02-20      PT/INR - ( 20 Feb 2018 06:00 )   PT: 23.2 SEC;   INR: 2.06     PTT - ( 20 Feb 2018 06:00 )  PTT:42.7 SEC      < from: CT Chest No Cont (02.10.18 @ 11:09) >    EXAM:  CT CHEST        PROCEDURE DATE:  Feb 10 2018         INTERPRETATION:  CLINICAL INFORMATION: Cough and fever    COMPARISON: CT abdomen from 7/1/2014    PROCEDURE:   CT of the Chest was performed without intravenous contrast.  Sagittal and coronal reformats were performed.      FINDINGS:    CHEST:     LUNGS AND LARGE AIRWAYS: Patent central airways.  Patchy airspace   consolidations most prominent in the right upper lobe and bilateral lower   lobes and to a lesser extent within the posterior left upper lobe   consistent with multifocal pneumonia.  PLEURA: Small to moderate right and small left pleural effusions.  VESSELS: Atherosclerotic changes of the aorta and coronary arteries.  HEART: Cardiomegaly with marked biatrial enlargement.CABG. Mitral valve   replacement.  No pericardial effusion.   MEDIASTINUM AND CHELE: No lymphadenopathy.  CHEST WALL AND LOWER NECK: Sternotomy.  VISUALIZED UPPER ABDOMEN: Cirrhosis. A few scattered hepatic cysts.   Atrophic kidneys.  BONES: Spinal degenerative changes.    IMPRESSION:     Multifocal pneumonia.    Bilateral pleural effusions.    Cirrhosis.    < from: Transthoracic Echocardiogram (02.15.18 @ 10:34) >  Patient name: FILI RAMOS  YOB: 1945   Age: 72 (M)   MR#: [id number]  Study Date: 2/15/2018  Location: [facility] Sonographer: Sarah Reno RAJEEV  Study quality: Technically good  Referring Physician: Gayathri Puckett MD  Blood Pressure: 124/56 mmHg  Height: 165 cm  Weight: 63 kg  BSA: 1.7 m2  ------------------------------------------------------------------------  PROCEDURE: Transthoracic echocardiogram with 2-D, M-Mode  and complete spectral and color flow Doppler.  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 Minimal mitral regurgitation. Mean transmitral  valve gradient equals 10 mm Hg, which is elevated even in  the setting of a prosthetic valve.  Aortic Root: Normal aortic root.  Aortic Valve: Calcified trileaflet aortic valve with normal  opening. Mild aortic regurgitation.  Left Atrium: Severely dilated left atrium.  LA volume index  = 154 cc/m2.  Left Ventricle: Normal left ventricular systolic function.  No segmental wall motion abnormalities. Normal left  ventricular internal dimensions and wall thicknesses.  Right Heart: Severe right atrial enlargement. Right  ventricular enlargement with decreased right ventricular  systolic function. Normal tricuspid valve.  Moderate  tricuspid regurgitation. Normal pulmonic valve. Minimal  pulmonic regurgitation.  Pericardium/PleuraNormal pericardium with no pericardial  effusion.  Hemodynamic: Estimated right ventricular systolic pressure  equals 69 mm Hg, assuming right atrial pressure equals 10  mm Hg, consistent with severe pulmonary hypertension.  ------------------------------------------------------------------------  CONCLUSIONS:  1. Mechanical prosthetic mitral valve replacement. Minimal  mitral regurgitation. Mean transmitral valve gradient  equals 10 mm Hg, which is elevated even in the setting of a  prosthetic valve.  2. Calcified trileaflet aortic valve with normal opening.  Mild aortic regurgitation.  3. Severely dilated left atrium.  LA volume index = 154  cc/m2.  4. Normal left ventricular internal dimensions and wall  thicknesses.  5. Normal left ventricular systolic function. No segmental  wall motion abnormalities.  6. Severe right atrial enlargement.  7. Right ventricular enlargement with decreased right  ventricular systolic function.  8. Estimated right ventricular systolic pressure equals 69  mm Hg, assuming right atrial pressure equals 10 mm Hg,  consistent with severe pulmonary hypertension.  Consider MATTHEW for further evaluation of the prosthetic  mitral valve, if clinically indicated.  ------------------------------------------------------------------------  Confirmed on  2/15/2018 - 13:19:36 by Kaleb Luis M.D.  ------------------------------------------------------------------------    < end of copied text >    MORGAN LIU M.D., RADIOLOGY RESIDENT  This document has been electronically signed.  CHARITO RODRIGUEZ M.D., ATTENDING RADIOLOGIST  This document has been electronically signed. Feb 10 2018  1:11PM Epidural

## 2024-06-06 NOTE — PATIENT PROFILE ADULT. - NS PRO AD NO ADVANCE DIRECTIVE
[facility] chart was requested from [facility]. Requested medical records faxed to   
No
